# Patient Record
Sex: FEMALE | Race: WHITE
[De-identification: names, ages, dates, MRNs, and addresses within clinical notes are randomized per-mention and may not be internally consistent; named-entity substitution may affect disease eponyms.]

---

## 2018-01-09 ENCOUNTER — HOSPITAL ENCOUNTER (OUTPATIENT)
Dept: HOSPITAL 45 - X.SURG | Age: 78
Discharge: HOME | End: 2018-01-09
Attending: OPHTHALMOLOGY
Payer: COMMERCIAL

## 2018-01-09 VITALS — OXYGEN SATURATION: 95 % | SYSTOLIC BLOOD PRESSURE: 172 MMHG | DIASTOLIC BLOOD PRESSURE: 88 MMHG | HEART RATE: 64 BPM

## 2018-01-09 VITALS
HEIGHT: 61.5 IN | BODY MASS INDEX: 31.19 KG/M2 | WEIGHT: 167.35 LBS | BODY MASS INDEX: 31.19 KG/M2 | WEIGHT: 167.35 LBS | HEIGHT: 61.5 IN

## 2018-01-09 VITALS — TEMPERATURE: 98.24 F

## 2018-01-09 DIAGNOSIS — Z88.1: ICD-10-CM

## 2018-01-09 DIAGNOSIS — Z88.2: ICD-10-CM

## 2018-01-09 DIAGNOSIS — I10: ICD-10-CM

## 2018-01-09 DIAGNOSIS — E66.3: ICD-10-CM

## 2018-01-09 DIAGNOSIS — G47.33: ICD-10-CM

## 2018-01-09 DIAGNOSIS — Z82.49: ICD-10-CM

## 2018-01-09 DIAGNOSIS — Z80.8: ICD-10-CM

## 2018-01-09 DIAGNOSIS — Z82.3: ICD-10-CM

## 2018-01-09 DIAGNOSIS — Z79.82: ICD-10-CM

## 2018-01-09 DIAGNOSIS — Z90.89: ICD-10-CM

## 2018-01-09 DIAGNOSIS — H26.9: Primary | ICD-10-CM

## 2018-01-09 DIAGNOSIS — E78.5: ICD-10-CM

## 2018-01-09 DIAGNOSIS — Z79.899: ICD-10-CM

## 2018-01-09 RX ADMIN — KETOROLAC TROMETHAMINE SCH DROP: 5 SOLUTION OPHTHALMIC at 10:14

## 2018-01-09 RX ADMIN — PHENYLEPHRINE HYDROCHLORIDE SCH DROP: 25 SOLUTION/ DROPS OPHTHALMIC at 10:11

## 2018-01-09 RX ADMIN — KETOROLAC TROMETHAMINE SCH DROP: 5 SOLUTION OPHTHALMIC at 10:19

## 2018-01-09 RX ADMIN — TROPICAMIDE SCH DROP: 10 SOLUTION/ DROPS OPHTHALMIC at 10:17

## 2018-01-09 RX ADMIN — CYCLOPENTOLATE HYDROCHLORIDE SCH DROP: 10 SOLUTION/ DROPS OPHTHALMIC at 10:13

## 2018-01-09 RX ADMIN — PHENYLEPHRINE HYDROCHLORIDE SCH DROP: 25 SOLUTION/ DROPS OPHTHALMIC at 10:16

## 2018-01-09 RX ADMIN — GATIFLOXACIN SCH DROP: 5 SOLUTION/ DROPS OPHTHALMIC at 10:15

## 2018-01-09 RX ADMIN — CYCLOPENTOLATE HYDROCHLORIDE SCH DROP: 10 SOLUTION/ DROPS OPHTHALMIC at 10:18

## 2018-01-09 RX ADMIN — GATIFLOXACIN SCH DROP: 5 SOLUTION/ DROPS OPHTHALMIC at 10:25

## 2018-01-09 RX ADMIN — TROPICAMIDE SCH DROP: 10 SOLUTION/ DROPS OPHTHALMIC at 10:12

## 2018-01-09 NOTE — ANESTHESIA PROGRESS NT - [facility]
Anesthesia Post Op Note


Date & Time


Jan 9, 2018 at 11:35





Vital Signs


Pain Intensity:  0





Vital Signs Past 12 Hours








  Date Time  Temp Pulse Resp B/P (MAP) Pulse Ox O2 Delivery O2 Flow Rate FiO2


 


1/9/18 11:30  64 16 172/88 (116) 95 Room Air  


 


1/9/18 11:08 36.8 58 16 159/91 (113) 96 Room Air  


 


1/9/18 10:28  64  172/94 (120)    


 


1/9/18 10:04 36.7 68 16 173/104 (127) 97 Room Air  











Notes


Mental Status:  alert / awake / arousable, participated in evaluation


Pt Amnestic to Procedure:  Yes


Nausea / Vomiting:  adequately controlled


Pain:  adequately controlled


Airway Patency, RR, SpO2:  stable & adequate


BP & HR:  stable & adequate


Hydration State:  stable & adequate


Anesthetic Complications:  no major complications apparent

## 2018-01-09 NOTE — [facility] OPERATIVE REPORT
Operative Report


Date of Service


Jan 9, 2018.





Operative Report


1.  PREOPERATIVE DIAGNOSIS:  Cataract of the left eye.





2.  POSTOPERATIVE DIAGNOSIS:  Same.





3.  PROCEDURE:  Phacoemulsification with intraocular lens implantation of the 

left eye.  





SURGEON:  Dr. Gabe Enciso.  ANESTHESIA:  Topical Lidocaine gel, 1% Non-

Preserved intracameral Lidocaine, and monitored intravenous sedation.  





INDICATIONS FOR THE PROCEDURE:  The patient is a 77 - year-old female with a 

history of cataract of the left eye causing significant visual impairment.  The 

details of the proposed procedure were explained to the patient who asked 

appropriate questions and following discussion of all risks, benefits and 

alternatives agreed to have the procedure done.  





4.  OPERATION AND FINDINGS:  





DESCRIPTION OF PROCEDURE:  After informed consent was obtained, the patient was 

brought to the Operating Room at the [facility].  The 

patient was placed in a supine position and then the left eye was prepped and 

draped in the usual sterile fashion for intraocular surgery.  A drop of topical 

Lidocaine gel was placed in the operative eye.  A wire lid speculum was then 

placed in the fornices.  A corneal paracentesis was then created temporally.  

The Non-Preserved Lidocaine was then instilled into the anterior chamber.  The 

anterior chamber was then pressurized with viscoelastic.  A 2.0 mm clear 

corneal incision was then created temporally.  A cystotome was inserted into 

the anterior chamber and used to create a tear in the anterior lens capsule.  

This capsular tear was then used to create a small flap and the flap was 

dragged in a counterclockwise direction in order to create a continuous 

curvilinear capsulorrhexis.  Hydrodissection was accomplished with balanced 

salt solution.  Phacoemulsification of the lens nucleus was then performed in a 

standard divide-and-conquer technique.  The phaco time was 23 seconds with an 

average power of 14 %.  The remaining cortical material was removed using 

irrigation aspiration.  The capsular bag was then filled with viscoelastic.  A 

Bausch & Lomb MI60L +18.5 diopters lens was then loaded into the injector and 

injected into the capsular bag.  The remaining viscoelastic was removed with 

the irrigation aspiration handpiece.  The wound was hydrated and then checked 

and found to be watertight.  The intraocular pressure was checked and found to 

be adequate.  The wire lid speculum was removed and the patient's face was 

cleaned and dried.  TobraDex ointment was placed in the inferior fornix.  The 

patient was discharged to the Recovery Room having tolerated the procedure 

well.  There were no complications.  The patient will be seen tomorrow in the 

office for follow-up.


I attest to the content of the Intraoperative Record and any orders documented 

therein.  Any exceptions are noted below.

## 2018-02-06 ENCOUNTER — HOSPITAL ENCOUNTER (OUTPATIENT)
Dept: HOSPITAL 45 - X.SURG | Age: 78
Discharge: HOME | End: 2018-02-06
Attending: OPHTHALMOLOGY
Payer: COMMERCIAL

## 2018-02-06 VITALS
HEIGHT: 61.5 IN | BODY MASS INDEX: 31.19 KG/M2 | WEIGHT: 167.35 LBS | WEIGHT: 167.35 LBS | BODY MASS INDEX: 31.19 KG/M2 | HEIGHT: 61.5 IN

## 2018-02-06 VITALS — DIASTOLIC BLOOD PRESSURE: 84 MMHG | SYSTOLIC BLOOD PRESSURE: 170 MMHG

## 2018-02-06 VITALS — OXYGEN SATURATION: 94 % | HEART RATE: 76 BPM

## 2018-02-06 VITALS — TEMPERATURE: 97.16 F

## 2018-02-06 DIAGNOSIS — G47.33: ICD-10-CM

## 2018-02-06 DIAGNOSIS — Z88.2: ICD-10-CM

## 2018-02-06 DIAGNOSIS — K21.9: ICD-10-CM

## 2018-02-06 DIAGNOSIS — I10: ICD-10-CM

## 2018-02-06 DIAGNOSIS — E78.5: ICD-10-CM

## 2018-02-06 DIAGNOSIS — M19.90: ICD-10-CM

## 2018-02-06 DIAGNOSIS — H26.9: Primary | ICD-10-CM

## 2018-02-06 DIAGNOSIS — Z91.040: ICD-10-CM

## 2018-02-06 DIAGNOSIS — Z88.1: ICD-10-CM

## 2018-02-06 DIAGNOSIS — Z99.89: ICD-10-CM

## 2018-02-06 RX ADMIN — TROPICAMIDE SCH DROP: 10 SOLUTION/ DROPS OPHTHALMIC at 06:50

## 2018-02-06 RX ADMIN — TROPICAMIDE SCH DROP: 10 SOLUTION/ DROPS OPHTHALMIC at 06:56

## 2018-02-06 RX ADMIN — GATIFLOXACIN SCH DROP: 5 SOLUTION/ DROPS OPHTHALMIC at 07:02

## 2018-02-06 RX ADMIN — CYCLOPENTOLATE HYDROCHLORIDE SCH DROP: 10 SOLUTION/ DROPS OPHTHALMIC at 06:51

## 2018-02-06 RX ADMIN — CYCLOPENTOLATE HYDROCHLORIDE SCH DROP: 10 SOLUTION/ DROPS OPHTHALMIC at 06:57

## 2018-02-06 RX ADMIN — PHENYLEPHRINE HYDROCHLORIDE SCH DROP: 25 SOLUTION/ DROPS OPHTHALMIC at 06:49

## 2018-02-06 RX ADMIN — KETOROLAC TROMETHAMINE SCH DROP: 5 SOLUTION OPHTHALMIC at 06:58

## 2018-02-06 RX ADMIN — GATIFLOXACIN SCH DROP: 5 SOLUTION/ DROPS OPHTHALMIC at 06:53

## 2018-02-06 RX ADMIN — PHENYLEPHRINE HYDROCHLORIDE SCH DROP: 25 SOLUTION/ DROPS OPHTHALMIC at 06:55

## 2018-02-06 RX ADMIN — KETOROLAC TROMETHAMINE SCH DROP: 5 SOLUTION OPHTHALMIC at 06:52

## 2018-02-06 NOTE — DISCHARGE INSTRUCTIONS-[facility]
Discharge Instructions


Date of Service


Feb 6, 2018.





Visit


Reason for Visit:  Cataract Right Eye





Discharge


Discharge Diagnosis / Problem:  cataract





Discharge Goals


Goal(s):  Improve function





Activity Recommendations


Activity Limitations:  per Instructions/Follow-up section





Anesthesia


.





Post Anesthesia Instructions:





If you have had General Anesthesia or IV Sedation:





*  Do not drive today.


*  Resume driving when surgeon permits.


*  Do not make important decisions or sign legal documents today.


*  Call surgeon for:





   1.  Temperature elevations greater than 101 degrees F.


   2.  Uncontrollable pain.


   3.  Excessive bleeding.


   4.  Persistent nausea and vomiting.


   5.  Medication intolerance (nausea, vomiting or rash).





*  For nausea and vomiting use only clear liquids such as: tea, soda, bouillon 

until nausea subsides, then gradually increase diet as tolerated.





*  If you have any concerns or questions, call your surgeon's office.  If 

physician is unavailable and it is an emergency, call 911 or go to the nearest 

emergency room.





.





Diet Recommendations


Home Diet:  resume previous diet





Procedures


Procedures Performed:  


Right Cataract Phacoemulsification With Intraocular Lens Implant





Pending Studies


Studies pending at discharge:  no





Medical Emergencies








.


Who to Call and When:





Medical Emergencies:  If at any time you feel your situation is an emergency, 

please call 911 immediately.





.





Non-Emergent Contact


Non-Emergency issues call your:  Ophthalmologist





.


.








"Provider Documentation" section prepared by Gabe Enciso.








.

## 2018-02-06 NOTE — ANESTHESIA PROGRESS NT - [facility]
Anesthesia Post Op Note


Date & Time


Feb 6, 2018 at 08:21





Vital Signs


Pain Intensity:  0





Vital Signs Past 12 Hours








  Date Time  Temp Pulse Resp B/P (MAP) Pulse Ox O2 Delivery O2 Flow Rate FiO2


 


2/6/18 08:00 36.2 68 16 156/88 (110) 97 Room Air  


 


2/6/18 06:35 36.5 74 16 161/89 (113) 96 Room Air  











Notes


Mental Status:  alert / awake / arousable, participated in evaluation


Pt Amnestic to Procedure:  Yes


Nausea / Vomiting:  adequately controlled


Pain:  adequately controlled


Airway Patency, RR, SpO2:  stable & adequate


BP & HR:  stable & adequate


Hydration State:  stable & adequate


Anesthetic Complications:  no major complications apparent

## 2018-02-06 NOTE — [facility] OPERATIVE REPORT
Operative Report


Date of Service


Feb 6, 2018.





Operative Report


1.  PREOPERATIVE DIAGNOSIS:  Cataract of the right eye.





2.  POSTOPERATIVE DIAGNOSIS:  Same.





3.  PROCEDURE:  Phacoemulsification with intraocular lens implantation of the 

right eye.  





SURGEON:  Dr. Gabe Enciso.  ANESTHESIA:  Topical Lidocaine gel, 1% Non-

Preserved intracameral Lidocaine, and monitored intravenous sedation.  





INDICATIONS FOR THE PROCEDURE:  The patient is a 77 - year-old female with a 

history of cataract of the right eye causing significant visual impairment.  

The details of the proposed procedure were explained to the patient who asked 

appropriate questions and following discussion of all risks, benefits and 

alternatives agreed to have the procedure done.  





4.  OPERATION AND FINDINGS:  





DESCRIPTION OF PROCEDURE:  After informed consent was obtained, the patient was 

brought to the Operating Room at the [facility].  The 

patient was placed in a supine position and then the right eye was prepped and 

draped in the usual sterile fashion for intraocular surgery.  A drop of topical 

Lidocaine gel was placed in the operative eye.  A wire lid speculum was then 

placed in the fornices.  A corneal paracentesis was then created temporally.  

The Non-Preserved Lidocaine was then instilled into the anterior chamber.  The 

anterior chamber was then pressurized with viscoelastic.  A 2.0 mm clear 

corneal incision was then created temporally.  A cystotome was inserted into 

the anterior chamber and used to create a tear in the anterior lens capsule.  

This capsular tear was then used to create a small flap and the flap was 

dragged in a counterclockwise direction in order to create a continuous 

curvilinear capsulorrhexis.  Hydrodissection was accomplished with balanced 

salt solution.  Phacoemulsification of the lens nucleus was then performed in a 

standard divide-and-conquer technique.  The phaco time was 22 seconds with an 

average power of 9 %.  The remaining cortical material was removed using 

irrigation aspiration.  The capsular bag was then filled with viscoelastic.  A 

Bausch & Lomb MI60L +20.5 diopters lens was then loaded into the injector and 

injected into the capsular bag.  The remaining viscoelastic was removed with 

the irrigation aspiration handpiece.  The wound was hydrated and then checked 

and found to be watertight.  The intraocular pressure was checked and found to 

be adequate.  The wire lid speculum was removed and the patient's face was 

cleaned and dried.  TobraDex ointment was placed in the inferior fornix.  The 

patient was discharged to the Recovery Room having tolerated the procedure 

well.  There were no complications.  The patient will be seen tomorrow in the 

office for follow-up.


I attest to the content of the Intraoperative Record and any orders documented 

therein.  Any exceptions are noted below.

## 2018-03-17 ENCOUNTER — HOSPITAL ENCOUNTER (INPATIENT)
Dept: HOSPITAL 45 - C.EDA | Age: 78
LOS: 13 days | Discharge: HOME | DRG: 871 | End: 2018-03-30
Attending: HOSPITALIST | Admitting: FAMILY MEDICINE
Payer: COMMERCIAL

## 2018-03-17 VITALS
BODY MASS INDEX: 32.59 KG/M2 | BODY MASS INDEX: 32.59 KG/M2 | HEIGHT: 61 IN | HEIGHT: 61 IN | WEIGHT: 172.62 LBS | WEIGHT: 172.62 LBS

## 2018-03-17 VITALS — OXYGEN SATURATION: 93 %

## 2018-03-17 VITALS
TEMPERATURE: 99.32 F | HEART RATE: 80 BPM | OXYGEN SATURATION: 96 % | DIASTOLIC BLOOD PRESSURE: 70 MMHG | SYSTOLIC BLOOD PRESSURE: 134 MMHG

## 2018-03-17 VITALS
TEMPERATURE: 97.88 F | DIASTOLIC BLOOD PRESSURE: 71 MMHG | OXYGEN SATURATION: 98 % | HEART RATE: 69 BPM | SYSTOLIC BLOOD PRESSURE: 112 MMHG

## 2018-03-17 VITALS
HEART RATE: 90 BPM | TEMPERATURE: 100.22 F | DIASTOLIC BLOOD PRESSURE: 94 MMHG | OXYGEN SATURATION: 93 % | SYSTOLIC BLOOD PRESSURE: 160 MMHG

## 2018-03-17 VITALS — OXYGEN SATURATION: 96 %

## 2018-03-17 VITALS — OXYGEN SATURATION: 97 % | HEART RATE: 92 BPM

## 2018-03-17 DIAGNOSIS — Z79.82: ICD-10-CM

## 2018-03-17 DIAGNOSIS — Z88.1: ICD-10-CM

## 2018-03-17 DIAGNOSIS — N17.9: ICD-10-CM

## 2018-03-17 DIAGNOSIS — T50.905A: ICD-10-CM

## 2018-03-17 DIAGNOSIS — I11.0: ICD-10-CM

## 2018-03-17 DIAGNOSIS — Z88.2: ICD-10-CM

## 2018-03-17 DIAGNOSIS — M62.82: ICD-10-CM

## 2018-03-17 DIAGNOSIS — I50.23: ICD-10-CM

## 2018-03-17 DIAGNOSIS — E86.0: ICD-10-CM

## 2018-03-17 DIAGNOSIS — A41.9: Primary | ICD-10-CM

## 2018-03-17 DIAGNOSIS — G47.33: ICD-10-CM

## 2018-03-17 DIAGNOSIS — Z79.899: ICD-10-CM

## 2018-03-17 DIAGNOSIS — E78.5: ICD-10-CM

## 2018-03-17 DIAGNOSIS — R29.6: ICD-10-CM

## 2018-03-17 DIAGNOSIS — Z91.81: ICD-10-CM

## 2018-03-17 DIAGNOSIS — Z91.040: ICD-10-CM

## 2018-03-17 DIAGNOSIS — L03.213: ICD-10-CM

## 2018-03-17 DIAGNOSIS — E87.6: ICD-10-CM

## 2018-03-17 DIAGNOSIS — R21: ICD-10-CM

## 2018-03-17 DIAGNOSIS — I25.10: ICD-10-CM

## 2018-03-17 LAB
ALBUMIN SERPL-MCNC: 3.2 GM/DL (ref 3.4–5)
ALP SERPL-CCNC: 120 U/L (ref 45–117)
ALT SERPL-CCNC: 29 U/L (ref 12–78)
AST SERPL-CCNC: 49 U/L (ref 15–37)
BASOPHILS # BLD: 0.01 K/UL (ref 0–0.2)
BASOPHILS NFR BLD: 0.1 %
BUN SERPL-MCNC: 28 MG/DL (ref 7–18)
CALCIUM SERPL-MCNC: 9.4 MG/DL (ref 8.5–10.1)
CO2 SERPL-SCNC: 24 MMOL/L (ref 21–32)
CREAT SERPL-MCNC: 1.55 MG/DL (ref 0.6–1.2)
EOS ABS #: 0 K/UL (ref 0–0.5)
EOSINOPHIL NFR BLD AUTO: 150 K/UL (ref 130–400)
GLUCOSE SERPL-MCNC: 150 MG/DL (ref 70–99)
HCT VFR BLD CALC: 37 % (ref 37–47)
HGB BLD-MCNC: 13.2 G/DL (ref 12–16)
IG#: 0.07 K/UL (ref 0–0.02)
IMM GRANULOCYTES NFR BLD AUTO: 2.4 %
INR PPP: 1.1 (ref 0.9–1.1)
LIPASE: 140 U/L (ref 73–393)
LYMPHOCYTES # BLD: 0.46 K/UL (ref 1.2–3.4)
MCH RBC QN AUTO: 30.6 PG (ref 25–34)
MCHC RBC AUTO-ENTMCNC: 35.7 G/DL (ref 32–36)
MCV RBC AUTO: 85.8 FL (ref 80–100)
MONO ABS #: 0.91 K/UL (ref 0.11–0.59)
MONOCYTES NFR BLD: 4.7 %
NEUT ABS #: 18.06 K/UL (ref 1.4–6.5)
NEUTROPHILS # BLD AUTO: 0 %
NEUTROPHILS NFR BLD AUTO: 92.4 %
PMV BLD AUTO: 10.7 FL (ref 7.4–10.4)
POTASSIUM SERPL-SCNC: 3.5 MMOL/L (ref 3.5–5.1)
PROT SERPL-MCNC: 7.3 GM/DL (ref 6.4–8.2)
PTT PATIENT: 26.4 SECONDS (ref 21–31)
PTT PATIENT: 37.7 SECONDS (ref 21–31)
RED CELL DISTRIBUTION WIDTH CV: 14.1 % (ref 11.5–14.5)
RED CELL DISTRIBUTION WIDTH SD: 44.8 FL (ref 36.4–46.3)
SODIUM SERPL-SCNC: 134 MMOL/L (ref 136–145)
WBC # BLD AUTO: 19.51 K/UL (ref 4.8–10.8)

## 2018-03-17 RX ADMIN — CETIRIZINE HYDROCHLORIDE PRN MG: 10 TABLET, FILM COATED ORAL at 15:54

## 2018-03-17 RX ADMIN — HEPARIN SODIUM SCH MLS/HR: 5000 INJECTION, SOLUTION INTRAVENOUS at 18:11

## 2018-03-17 RX ADMIN — ACETAMINOPHEN PRN MG: 325 TABLET ORAL at 15:54

## 2018-03-17 RX ADMIN — PIPERACILLIN AND TAZOBACTAM SCH MLS/HR: 3; .375 INJECTION, POWDER, LYOPHILIZED, FOR SOLUTION INTRAVENOUS; PARENTERAL at 23:18

## 2018-03-17 RX ADMIN — PIPERACILLIN AND TAZOBACTAM SCH MLS/HR: 3; .375 INJECTION, POWDER, LYOPHILIZED, FOR SOLUTION INTRAVENOUS; PARENTERAL at 15:53

## 2018-03-17 RX ADMIN — ALBUTEROL SULFATE SCH PUFFS: 90 AEROSOL, METERED RESPIRATORY (INHALATION) at 17:11

## 2018-03-17 RX ADMIN — SODIUM CHLORIDE SCH MLS/HR: 900 INJECTION, SOLUTION INTRAVENOUS at 23:18

## 2018-03-17 RX ADMIN — IPRATROPIUM BROMIDE AND ALBUTEROL SULFATE SCH ML: .5; 3 SOLUTION RESPIRATORY (INHALATION) at 19:23

## 2018-03-17 RX ADMIN — ACETAMINOPHEN PRN MG: 325 TABLET ORAL at 20:44

## 2018-03-17 RX ADMIN — SODIUM CHLORIDE SCH MLS/HR: 900 INJECTION, SOLUTION INTRAVENOUS at 15:09

## 2018-03-17 NOTE — DIAGNOSTIC IMAGING REPORT
CHEST 1 VW FRONT-NOT PORTABLE



CLINICAL HISTORY: FEVER/SEPSIS. CHANGED PORT TO DEPT    



COMPARISON STUDY:  No previous studies for comparison.



FINDINGS: Mild cardiomegaly. Lungs are grossly clear. Slight basilar

interstitial prominence considered nonspecific. 



IMPRESSION:  Slight bibasilar interstitial prominence. Otherwise negative study.

Mild cardia megaly. 











The above report was generated using voice recognition software.  It may contain

grammatical, syntax or spelling errors.









Electronically signed by:  Rosalio Foster M.D.

3/17/2018 11:19 AM



Dictated Date/Time:  3/17/2018 11:18 AM

## 2018-03-17 NOTE — DIAGNOSTIC IMAGING REPORT
R KNEE 3 VIEWS



CLINICAL HISTORY: pain fall trauma. Pain.



COMPARISON: None.



DISCUSSION: The bones and joint spaces appear intact. There is no evidence of

fracture, dislocation or bony disease. Generalized degenerative change. Mild

soft tissue edema. No significant joint effusion.



IMPRESSION: Degenerative change. No acute bony abnormality.











The above report was generated using voice recognition software.  It may contain

grammatical, syntax or spelling errors.







Electronically signed by:  Rosalio Foster M.D.

3/17/2018 11:05 AM



Dictated Date/Time:  3/17/2018 11:05 AM

## 2018-03-17 NOTE — DIAGNOSTIC IMAGING REPORT
HEAD WITHOUT CONTRAST (CT)



CT DOSE: 1046.89 mGy.cm



HISTORY: Trauma  pain fall



TECHNIQUE: Multiaxial CT images of the head were performed without the use of

intravenous contrast.  A dose lowering technique was utilized adhering to the

principles of ALARA.





Comparison: None.



Findings: The paranasal sinuses and mastoid air cells are clear. The calvarium

and skull base are intact. The ventricles and sulci are within normal limits.

There is no mass, hematoma, midline shift, or acute infarct. 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

periventricular infarct. Mild age-related chronic small vessel change.



Impression:

No acute intracranial abnormality. Chronic and age-related change.











The above report was generated using voice recognition software.  It may contain

grammatical, syntax or spelling errors.







Electronically signed by:  Rosalio Foster M.D.

3/17/2018 11:52 AM



Dictated Date/Time:  3/17/2018 11:51 AM

## 2018-03-17 NOTE — DIAGNOSTIC IMAGING REPORT
CERVICAL SPINE W/O





CT DOSE:    



HISTORY: Trauma  pain fall



TECHNIQUE: Multiaxial CT images of the cervical spine were performed and

reformatted in the sagittal and coronal plane without the use of contrast.  A

dose lowering technique was utilized adhering to the principles of ALARA.



COMPARISON:  None.



FINDINGS: No fractures. No subluxation. Prevertebral soft tissues and the C1-C2

interval are intact. No pneumothorax.



IMPRESSION:  

No fractures within the cervical spine. Moderate degenerative disc changes

throughout.







The above report was generated using voice recognition software.  It may contain

grammatical, syntax or spelling errors.







Electronically signed by:  Rosalio Foster M.D.

3/17/2018 11:53 AM



Dictated Date/Time:  3/17/2018 11:52 AM

## 2018-03-17 NOTE — PHARMACY PROGRESS NOTE
Pharmacy Abx Dose Short Note


Date of Service


Mar 17, 2018.





Assessment & Plan











Item Value  Date Time


 


White Blood Count 19.51 K/uL H 3/17/18 1022


 


Creatinine 1.55 mg/dl H 3/17/18 1022


 


Est Creatinine Clear Calc Drug Dose 27.8 ml/min 3/17/18 1022








Assessment


77 year old female receiving VANC/ZOSYN for treatment of facial swelling





Plan


Vancomycin


* Estimated p'kinetics:  Vd~0.7L/kg, Ke~0.0276hr-1, T1/2~25 hrs


* LOADING DOSE:  VANC 1500mg (~20mg/kg) IV load in ED


* MAINTENANCE DOSE:  VANC 1000mg (~13mg/kg) IV q 24 hours


* Goal trough level: ~10-15 mcg/mL depending on C&S


* VANC trough @ [facility] prior to 3/ dose.


* Will monitor renal fxn closely.





Pharmacy will continue to follow and will adjust dose/frequency as necessary.  

Thank you.

## 2018-03-17 NOTE — EMERGENCY ROOM VISIT NOTE
History


Report prepared by Ruthy:  Sreekanth Freire


Under the Supervision of:  Dr. Cristino Weldon M.D.


First contact with patient:  09:41


Chief Complaint:  FALL


Stated Complaint:  FALL/EYE PAIN





History of Present Illness


The patient is a 77 year old female who presents to the Emergency Room with 

complaints of worsening facial swelling beginning yesterday. The patient states 

that her swelling began yesterday morning when she woke up. She notes that she 

felt fine the day before and did not have any symptoms. Per nurse, the patient 

was diagnosed in 2005 with a skin infection and is currently experiencing 

similar symptoms. The patient notes that she had a cold that started 2 days ago

, but believes that it has since turned into a sinus infection. She reports 

that she fell this morning at around 0700 because she became weak. She states 

that she fell to her knees and then caught herself. She notes that she did not 

hit her face or loss consciousness. She reports that she was unable to get up 

this morning after she fell because she was weak. The patient states that her 

knee is much stiffer than usual. In addition to feeling weak, she also 

complains of left eye pain, knee pain, and neck pain. She denies any nausea, 

vomiting, diarrhea, SOB, headache, and abdominal pain. She notes that she had a 

cataract operation a month ago, but denies any history of shingles and 

diabetes. She reports that she has had the herpes zoster vaccine. The patient 

states that she is not on any blood thinners.





   Source of History:  patient


   Onset:  yesterday


   Position:  other (face)


   Quality:  other (swelling)


   Timing:  worsening


   Associated Symptoms:  + weakness, No headache, No SOB, No nausea, No vomiting

, No abdominal pain, No diarrhea


Note:


The patient also complains of left eye pain, knee pain, and neck pain.





Review of Systems


See HPI for pertinent positives and negatives.  A total of ten systems were 

reviewed and were otherwise negative.





Past Medical & Surgical


Medical Problems:


(1) Skin infection


Surgical Problems:


(1) History of cataract surgery








Family History


No pertinent family history stated.





Social History


Smoking Status:  Never Smoker


Marital Status:  


Housing Status:  lives with family


Occupation Status:  retired





Current/Historical Medications


Scheduled


Albuterol Hfa (Ventolin Hfa), 2 PUFFS INH QID


Allopurinol (Zyloprim), 100 MG PO BID


Aspirin (Aspirin 81), 81 MG PO DAILY


Atenolol (Tenormin), 12.5 MG PO DAILY


Atorvastatin (Lipitor), 40 MG PO DAILY


Calcium Carbonate-Vitamin D (Calcium + D), 1 TAB PO DAILY


Clobetasol Propionate (Temovate), 1 APPLN TOP 4XWK


Felodipine (Plendil Er), 5 MG PO QAM


Hydrochlorothiazide (Hctz), 25 MG PO QAM


Ketoconazole (Topical) (Ketoconazole), 1 APPLN TOP 2XWK


Lisinopril (Prinivil), 10 MG PO BID


Magnesium (Magnesium), 1 TAB PO DAILY


Multivitamin (Multivitamin), 1 TAB PO DAILY


Omeprazole (Omeprazole), 1 TAB PO QAM


Terazosin Hcl (Hytrin), 2 MG PO QPM





Scheduled PRN


Cetirizine (Zyrtec), 10 MG PO DAILY PRN for Allergic Reaction


Furosemide (Lasix), 20 MG PO 2XWK PRN for FLUID ACCUMULATION


Meclizine Hcl (Meclizine Hcl), 25 MG PO TID PRN for Dizziness or Vertigo


Meclizine Hcl (Meclizine Hcl), 25 MG PO UD PRN for Dizziness or Vertigo


Saline (Ocean Nasal Essie), 1 DOSE INTNAS UD PRN for CONGESTION





Miscellaneous Medications


Albuterol Sulf (Ventolin)





Allergies


Coded Allergies:  


     Latex1 -Allergic Contact Dermititis (Verified  Allergy, Unknown, SLIGHT 

RASH, 3/17/18)


     Doxycycline (Unverified  Adverse Reaction, Unknown, GI UPSET, 3/17/18)


     Meperidine (Unverified  Adverse Reaction, Unknown, GI UPSET, 3/17/18)


     Propoxyphene (Unverified  Adverse Reaction, Unknown, GI UPSET, 3/17/18)


     Sulfa Antibiotics (Unverified  Adverse Reaction, Unknown, GI UPSET, 3/17/18

)





Physical Exam


Vital Signs











  Date Time  Temp Pulse Resp B/P (MAP) Pulse Ox O2 Delivery O2 Flow Rate FiO2


 


3/17/18 13:02  87 28 170/94 96 Nasal Cannula 2.0 


 


3/17/18 11:34  79 18 148/83 94 Nasal Cannula 2.0 


 


3/17/18 10:22  87 24 115/61 93 Nasal Cannula 2.0 


 


3/17/18 10:03     94 Nasal Cannula 2.0 


 


3/17/18 09:54  93      


 


3/17/18 09:48 37.8 70 12 105/65 85 Room Air  











Physical Exam


GENERAL: Awake, alert,  fatigued appearing, in no distress


HENT: Normocephalic, atraumatic. Oropharynx unremarkable, 1+ facial edema with 

erythema and warmth, left sided induration, no crepitus, mild submandibular 

lymphadenopathy, no oropharyngeal edema, no tongue elevation or trismus.


EYES: Normal conjunctiva. Sclera non-icteric. No proptosis, extraocular muscles 

intact.


NECK: Supple. No nuchal rigidity. FROM. No JVD.


RESPIRATORY: Clear to auscultation.


CARDIAC: Regular rate, normal rhythm. Extremities warm and well perfused. 

Pulses equal.


ABDOMEN: Soft, non-distended. No tenderness to palpation. No rebound or 

guarding. No masses. Obese abdomen.


RECTAL: Deferred.


MUSCULOSKELETAL: Chest examination reveals no tenderness. The back is 

symmetrical on inspection without obvious abnormality. There is no CVA 

tenderness to palpation. No joint edema. 


LOWER EXTREMITIES: Calves are equal size bilaterally. No edema. No 

discoloration. Mild tenderness to bilateral knees, PMS intact. 


NEURO: Normal sensorium. No sensory or motor deficits noted. 


SKIN: No rash or jaundice noted.





Medical Decision & Procedures


ER Provider


Diagnostic Interpretation:


Radiology results as stated below per my review and radiologist interpretation: 





L TIBIA/FIBULA 2 VIEWS ROUTINE





CLINICAL HISTORY: pain fall trauma. Pain.





COMPARISON: None.





DISCUSSION: The bones and joint spaces appear intact. There is no evidence of


fracture, dislocation or bony disease. There is no evidence for soft tissue


swelling.





IMPRESSION: Negative study.





The above report was generated using voice recognition software.  It may contain


grammatical, syntax or spelling errors.





Electronically signed by:  Rosalio Foster M.D.


3/17/2018 11:06 AM





R TIBIA/FIBULA 2 VIEWS ROUTINE





CLINICAL HISTORY: pain fall trauma





COMPARISON: None.





DISCUSSION: The bones and joint spaces appear intact. There is no evidence of


fracture, dislocation or bony disease. There is no evidence for soft tissue


swelling.





IMPRESSION: Negative study.





The above report was generated using voice recognition software.  It may contain


grammatical, syntax or spelling errors.





Electronically signed by:  Rosalio Foster M.D.


3/17/2018 11:05 AM





L KNEE 3 VIEWS





CLINICAL HISTORY: pain fall    





COMPARISON: None.





DISCUSSION: Generalized significant degenerative change all major joint


compartments. No evidence for fracture. Moderate osteophytic changes throughout.


There is no evidence for soft tissue swelling.





IMPRESSION: Significant degenerative change all major joint compartments. No


acute bony abnormality.





The above report was generated using voice recognition software.  It may contain


grammatical, syntax or spelling errors.





Electronically signed by:  Rosalio Foster M.D.


3/17/2018 11:07 AM





R KNEE 3 VIEWS





CLINICAL HISTORY: pain fall trauma. Pain.





COMPARISON: None.





DISCUSSION: The bones and joint spaces appear intact. There is no evidence of


fracture, dislocation or bony disease. Generalized degenerative change. Mild


soft tissue edema. No significant joint effusion.





IMPRESSION: Degenerative change. No acute bony abnormality.





The above report was generated using voice recognition software.  It may contain


grammatical, syntax or spelling errors.





Electronically signed by:  Rosalio Foster M.D.


3/17/2018 11:05 AM





PELVIS/BILATERAL HIP 2 VIEWS





FINDINGS: Moderate degenerative change of the hips bilaterally. Moderate


degenerative sclerotic changes of the sacroiliac joints and symphysis pubis.





No acute bony abnormality. Degenerative changes low lumbar spine.





IMPRESSION:  Degenerative change. No acute process. 





The above report was generated using voice recognition software.  It may contain


grammatical, syntax or spelling errors.





Electronically signed by:  Rosalio Foster M.D.


3/17/2018 11:11 AM





CHEST 1 VW FRONT-NOT PORTABLE





FINDINGS: Mild cardiomegaly. Lungs are grossly clear. Slight basilar


interstitial prominence considered nonspecific. 





IMPRESSION:  Slight bibasilar interstitial prominence. Otherwise negative study.


Mild cardia megaly. 





The above report was generated using voice recognition software.  It may contain


grammatical, syntax or spelling errors.





Electronically signed by:  Rosalio Foster M.D.


3/17/2018 11:19 AM





SOFT TISSUE NECK WITH





FINDINGS: Minimal to mild right perimandibular and submandibular infiltrative


changes subcutaneous fat and associated fascial planes. No evidence for true


mass or collection. Several small reactive nodes less than 6 mm.





The salivary glands including parotid and submandibular glands are unremarkable.





Glottic and subglottic regions are intact. There is no airway compromise.


Chronic apical fibrotic changes present. Is a subtle high density primarily


cystic nodule adjacent to the right T3-T4 neural foramina. This is felt to


represent a benign perineural cyst. There is mild apical fibrotic changes


bilaterally.





IMPRESSION:  


1. Mild nonspecific cellulitis of the right perimandibular and submandibular


region.


2. No evidence for drainable abscess or collection. 





The above report was generated using voice recognition software.  It may contain


grammatical, syntax or spelling errors.





Electronically signed by:  Rosalio Foster M.D.


3/17/2018 11:58 AM





HEAD WITHOUT CONTRAST (CT)





Findings: The paranasal sinuses and mastoid air cells are clear. The calvarium


and skull base are intact. The ventricles and sulci are within normal limits.


There is no mass, hematoma, midline shift, or acute infarct. 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


periventricular infarct. Mild age-related chronic small vessel change.





Impression:


No acute intracranial abnormality. Chronic and age-related change.





The above report was generated using voice recognition software.  It may contain


grammatical, syntax or spelling errors.





Electronically signed by:  Rosalio Foster M.D.


3/17/2018 11:52 AM





CERVICAL SPINE W/O





FINDINGS: No fractures. No subluxation. Prevertebral soft tissues and the C1-C2


interval are intact. No pneumothorax.





IMPRESSION:  


No fractures within the cervical spine. Moderate degenerative disc changes


throughout.





The above report was generated using voice recognition software.  It may contain


grammatical, syntax or spelling errors.





Electronically signed by:  Rosalio Foster M.D.


3/17/2018 11:53 AM





Laboratory Results


3/17/18 10:22








Red Blood Count 4.31, Mean Corpuscular Volume 85.8, Mean Corpuscular Hemoglobin 

30.6, Mean Corpuscular Hemoglobin Concent 35.7, Mean Platelet Volume 10.7, 

Neutrophils (%) (Auto) 92.4, Lymphocytes (%) (Auto) 2.4, Monocytes (%) (Auto) 

4.7, Eosinophils (%) (Auto) 0.0, Basophils (%) (Auto) 0.1, Neutrophils # (Auto) 

18.06, Lymphocytes # (Auto) 0.46, Monocytes # (Auto) 0.91, Eosinophils # (Auto) 

0.00, Basophils # (Auto) 0.01





3/17/18 10:22

















Test


  3/17/18


10:22 3/17/18


10:28


 


White Blood Count


  19.51 K/uL


(4.8-10.8) 


 


 


Red Blood Count


  4.31 M/uL


(4.2-5.4) 


 


 


Hemoglobin


  13.2 g/dL


(12.0-16.0) 


 


 


Hematocrit 37.0 % (37-47)  


 


Mean Corpuscular Volume


  85.8 fL


() 


 


 


Mean Corpuscular Hemoglobin


  30.6 pg


(25-34) 


 


 


Mean Corpuscular Hemoglobin


Concent 35.7 g/dl


(32-36) 


 


 


Platelet Count


  150 K/uL


(130-400) 


 


 


Mean Platelet Volume


  10.7 fL


(7.4-10.4) 


 


 


Neutrophils (%) (Auto) 92.4 %  


 


Lymphocytes (%) (Auto) 2.4 %  


 


Monocytes (%) (Auto) 4.7 %  


 


Eosinophils (%) (Auto) 0.0 %  


 


Basophils (%) (Auto) 0.1 %  


 


Neutrophils # (Auto)


  18.06 K/uL


(1.4-6.5) 


 


 


Lymphocytes # (Auto)


  0.46 K/uL


(1.2-3.4) 


 


 


Monocytes # (Auto)


  0.91 K/uL


(0.11-0.59) 


 


 


Eosinophils # (Auto)


  0.00 K/uL


(0-0.5) 


 


 


Basophils # (Auto)


  0.01 K/uL


(0-0.2) 


 


 


RDW Standard Deviation


  44.8 fL


(36.4-46.3) 


 


 


RDW Coefficient of Variation


  14.1 %


(11.5-14.5) 


 


 


Immature Granulocyte % (Auto) 0.4 %  


 


Immature Granulocyte # (Auto)


  0.07 K/uL


(0.00-0.02) 


 


 


Venous Blood pH


  7.45


(7.36-7.41) 


 


 


Venous Blood Partial Pressure


CO2 38 mmHg


(38.0-50.0) 


 


 


Venous Blood Partial Pressure


O2 43 mmHg 


  


 


 


Venous Blood HCO3 26 mmol/L  


 


Venous Blood Oxygen Saturation 78.2 %  


 


Venous Blood Base Excess 1.9 mEq/L  


 


Anion Gap


  13.0 mmol/L


(3-11) 


 


 


Est Creatinine Clear Calc


Drug Dose 27.8 ml/min 


  


 


 


Estimated GFR (


American) 36.8 


  


 


 


Estimated GFR (Non-


American 31.7 


  


 


 


BUN/Creatinine Ratio 18.3 (10-20)  


 


Calcium Level


  9.4 mg/dl


(8.5-10.1) 


 


 


Total Bilirubin


  0.7 mg/dl


(0.2-1) 


 


 


Direct Bilirubin


  0.3 mg/dl


(0-0.2) 


 


 


Aspartate Amino Transf


(AST/SGOT) 49 U/L (15-37) 


  


 


 


Alanine Aminotransferase


(ALT/SGPT) 29 U/L (12-78) 


  


 


 


Alkaline Phosphatase


  120 U/L


() 


 


 


Total Creatine Kinase


  1296 U/L


() 


 


 


Total Protein


  7.3 gm/dl


(6.4-8.2) 


 


 


Albumin


  3.2 gm/dl


(3.4-5.0) 


 


 


Lipase


  140 U/L


() 


 


 


Prothrombin Time


  


  11.2 SECONDS


(9.0-12.0)


 


Prothromb Time International


Ratio 


  1.1 (0.9-1.1) 


 





Laboratory results reviewed by me





Medications Administered











 Medications


  (Trade)  Dose


 Ordered  Sig/Sirisha


 Route  Start Time


 Stop Time Status Last Admin


Dose Admin


 


 Sodium Chloride  1,000 ml @ 


 999 mls/hr  Q1H1M STAT


 IV  3/17/18 09:55


 3/17/18 10:55 DC 3/17/18 10:16


999 MLS/HR


 


 Dexamethasone


 Sodium Phosphate


  (Dexamethasone


 Inj **Pf**)  10 mg  NOW  ONCE


 IV  3/17/18 10:00


 3/17/18 10:03 DC 3/17/18 10:17


10 MG


 


 Vancomycin HCl


 1500 mg/Sodium


 Chloride  530 ml @ 


 200 mls/hr  ONE  STAT


 IV  3/17/18 09:55


 3/17/18 12:33 DC 3/17/18 11:18


200 MLS/HR


 


 Piperacillin Sod/


 Tazobactam Sod


  (Zosyn Iv)  4.5 gm  NOW  STAT


 IV  3/17/18 09:55


 3/17/18 10:03 DC 3/17/18 10:18


4.5 GM


 


 Fentanyl Citrate


  (Fentanyl Inj)  50 mcg  NOW  STAT


 IV  3/17/18 11:19


 3/17/18 11:21 DC 3/17/18 11:37


50 MCG


 


 Aspirin


  (Aspirin Chew)  324 mg  NOW  STAT


 PO  3/17/18 12:12


 3/17/18 12:13 DC 3/17/18 12:15


324 MG


 


 Sodium Chloride  1,000 ml @ 


 999 mls/hr  Q1H1M STAT


 IV  3/17/18 12:32


 3/17/18 13:32 DC 3/17/18 12:47


999 MLS/HR


 


 Heparin Sodium/


 Dextrose


  (Heparin 25,000


 Unit/500ml D5W)  25,000 unit  STK-MED ONCE


 .ROUTE  3/17/18 12:42


 3/17/18 12:43 DC 3/17/18 13:06


25,000 UNIT











ECG Per My Interpretation


Indication:  weakness


Rate (beats per minute):  89


Rhythm:  sinus rhythm


Findings:  LBBB, PAC, left axis deviation, other (No sgarbossa criteria)





ED Course


0947: The patient was evaluated in room A11. A complete history and physical 

exam was performed.





1214: I reevaluated and updated the patient. 





1228: Upon reexamination, the patient was stable. I discussed the test results 

and treatment plan with her. Discussed the patient's case with Dr. Trejo - 

Hospitalist, [facility]. The patient will be evaluated for further management.





Medical Decision


I reviewed the patient's past medical history, medications, and the nursing 

notes as described above.





Differential Diagnoses Include: periorbital cellulitis, orbital cellulitis, 

abscess, PTA/RPA, Darren angina, pneumonia, bronchitis, UTI, sepsis, fracture, 

ICH.





The patient is a 77-year-old woman with a past medical history of prior sinus 

infection and facial cellulitis who presents emergency department with 

worsening facial swelling pain, redness and warmth that began yesterday morning 

now with evolving generalized weakness having a fall this morning onto her 

knees per hpi rather the patient is fatigued appearing but no acute distress, 

temperature is 37.8, heart rate 100s and vital signs otherwise stable.  On exam 

the patient has diffuse bilateral facial swelling with erythema and warmth, out 

induration to the left side of the face and periorbital region.  No crepitus.  

No proptosis.  EOMI. no oral pharyngeal edema, trismus or tongue elevation.  No 

stridor.  Of note, patient reports having had the zoster vaccine 4 years ago.  

Rash does not demonstrate any vesicles at this time.  Otherwise regarding the 

patient's fall she denies any head strike but does report some upper neck pain.

  She is not on any anticoagulation. Pain to bilateral knees although she has 

osteoarthritis at baseline.Patient is equivocal whether or not this is worse 

from her baseline. EKG demonstrates old LBBB with no evidence of acute ischemia 

e.g. no Sgarbossa criteria. Trop elevated to 2.6. However, also in the setting 

of infection/sepsis with WBC 19 and lactate 2.7. CT head, face, neck with no 

evidence of deep infection. Patient treated with Vanc/Zosyn. Additionally, 

given troponin elevation will treat for NSTEMI with heparin gtt without bolus. 

Otherwise, plain films of lower extremities negative for fx. Case d/w Rosie Huynh hospitalist, who will admit the patient for further management.





Medication Reconcilliation


Current Medication List:  was personally reviewed by me





Blood Pressure Screening


Patient's blood pressure:  Elevated blood pressure


Blood pressure disposition:  Elevated BP felt to be situational





Consults


Time Called:  1226


Consulting Physician:  Rosie Lomax


Returned Call:  1228


I discussed the patient with Rosie Lomax. He will 

evaluate the patient for further treatment.





Impression





 Primary Impression:  


 NSTEMI (non-ST elevated myocardial infarction)


 Additional Impressions:  


 Sepsis


 Periorbital cellulitis





Critical Care


I have personally spent greater than 55 minutes of critical care time in the 

direct management of this patient.  This includes bedside care, interpretation 

of diagnostic studies, and testing, discussion with consultants, patient, and 

family members, and other required patient management activities.  This 55 

minutes is in excess of all separately billable procedures.





Scribe Attestation


The scribe's documentation has been prepared under my direction and personally 

reviewed by me in its entirety. I confirm that the note above accurately 

reflects all work, treatment, procedures, and medical decision making performed 

by me.





Departure Information


Dispostion


Being Evaluated By Hospitalist





Referrals


Cata Lozada M.D. (PCP)





Patient Instructions


My [facility]





Problem Qualifiers

## 2018-03-17 NOTE — DIAGNOSTIC IMAGING REPORT
L KNEE 3 VIEWS



CLINICAL HISTORY: pain fall    



COMPARISON: None.



DISCUSSION: Generalized significant degenerative change all major joint

compartments. No evidence for fracture. Moderate osteophytic changes throughout.

There is no evidence for soft tissue swelling.



IMPRESSION: Significant degenerative change all major joint compartments. No

acute bony abnormality.











The above report was generated using voice recognition software.  It may contain

grammatical, syntax or spelling errors.







Electronically signed by:  Rosalio Foster M.D.

3/17/2018 11:07 AM



Dictated Date/Time:  3/17/2018 11:06 AM

## 2018-03-17 NOTE — DIAGNOSTIC IMAGING REPORT
L TIBIA/FIBULA 2 VIEWS ROUTINE



CLINICAL HISTORY: pain fall trauma. Pain.



COMPARISON: None.



DISCUSSION: The bones and joint spaces appear intact. There is no evidence of

fracture, dislocation or bony disease. There is no evidence for soft tissue

swelling.



IMPRESSION: Negative study.











The above report was generated using voice recognition software.  It may contain

grammatical, syntax or spelling errors.







Electronically signed by:  Rosalio Foster M.D.

3/17/2018 11:06 AM



Dictated Date/Time:  3/17/2018 11:06 AM

## 2018-03-17 NOTE — HISTORY AND PHYSICAL
History & Physical


Date & Time of Service:


Mar 17, 2018 at 13:14


Chief Complaint:


Fall/Eye Pain


Primary Care Physician:


Cata Lozada M.D.


History of Present Illness


Source:  patient, family, clinic records, hospital records


This is a 77 year old female with a PMH of CAD, HTN, HLD, THU on nocturnal CPAP

, hx. of vertigo - presents after a fall this morning. States that she woke up 

when it was dark and she reached for the lights and she fell and hit her knees 

and fell to the ground. She has chronic ambulatory dysfunction uses a cane at 

baseline, but tells me when she first got up, she did not have that next to 

her. Her  lives with her and when he came up to the bedroom he tried to 

get her up but she was too weak. She was awake and alert the whole time as per 

; wife states she did not pass out. Patient states that yesterday, her 

face "blew up" and became swollen and red. She does not know why this happened 

as she did not eat anything unusual or have any season or environmental 

allergies. She states that she has had this cellulitis in the past, when it was 

on her face and throughout her body. She states there is no visual loss; denies 

chest pain - has intermittent dyspnea with exertion. Denies palpitations. Has 

some nausea the past day. Denies vomiting or diarrhea. No fevers/chills.





Past Medical/Surgical History


Medical Problems:


(1) Skin infection


Surgical Problems:


(1) History of cataract surgery








Social History


Smoking Status:  Never Smoker


Marital Status:  


Occupational Status:  retired





Allergies


Coded Allergies:  


     Doxycycline (Verified  Allergy, Unknown, GI UPSET, 3/17/18)


     Latex1 -Allergic Contact Dermititis (Verified  Allergy, Unknown, SLIGHT 

RASH, 3/17/18)


     Meperidine (Verified  Allergy, Unknown, GI UPSET, 3/17/18)


     Propoxyphene (Verified  Allergy, Unknown, GI UPSET, 3/17/18)


     Sulfa Antibiotics (Verified  Allergy, Unknown, GI UPSET, 3/17/18)





Home Medications


Scheduled


Albuterol Hfa (Ventolin Hfa), 2 PUFFS INH QID


Allopurinol (Zyloprim), 100 MG PO BID


Aspirin (Aspirin 81), 81 MG PO DAILY


Atenolol (Tenormin), 12.5 MG PO DAILY


Atorvastatin (Lipitor), 40 MG PO DAILY


Calcium Carbonate-Vitamin D (Calcium + D), 1 TAB PO DAILY


Clobetasol Propionate (Temovate), 1 APPLN TOP 4XWK


Felodipine (Plendil Er), 5 MG PO QAM


Hydrochlorothiazide (Hctz), 25 MG PO QAM


Ketoconazole (Topical) (Ketoconazole), 1 APPLN TOP 2XWK


Lisinopril (Prinivil), 10 MG PO BID


Magnesium (Magnesium), 1 TAB PO DAILY


Multivitamin (Multivitamin), 1 TAB PO DAILY


Omeprazole (Omeprazole), 1 TAB PO QAM


Terazosin Hcl (Hytrin), 2 MG PO QPM





Scheduled PRN


Cetirizine (Zyrtec), 10 MG PO DAILY PRN for Allergic Reaction


Furosemide (Lasix), 20 MG PO 2XWK PRN for FLUID ACCUMULATION


Meclizine Hcl (Meclizine Hcl), 25 MG PO TID PRN for Dizziness or Vertigo


Meclizine Hcl (Meclizine Hcl), 25 MG PO UD PRN for Dizziness or Vertigo


Saline (Ocean Nasal [place]), 1 DOSE INTNAS UD PRN for CONGESTION





Miscellaneous Medications


Albuterol Sulf (Ventolin)





Review of Systems


Constitutional:  + weakness, + fatigue, No fever, No chills


Eyes:  No worsening of vision, No eye pain, No redness


ENT:  + problem reported (swelling/redness of face for the past day), No 

hearing loss, No unusual epistaxis, No nasal symptoms, No sore throat


Respiratory:  + dyspnea on exertion, No cough, No sputum, No wheezing, No 

shortness of breath, No dyspnea at rest, No hemoptysis


Abdomen:  + nausea, No pain, No vomiting, No diarrhea, No constipation, No GI 

bleeding


Musculoskeletal:  + joint pain, + swelling (facial swelling)


Genitourinary - Female:  No dysuria, No urinary frequency, No urinary urgency, 

No urinary incontinence, No urinary retention, No hematuria


Neurologic:  + weakness, + vertigo, + balance problems, No memory loss, No 

paralysis, No numbness/tingling


Psychiatric:  No depression symptoms, No anxiety, No insomnia


Endocrine:  + fatigue, No excessive thirst, No excessive urination


Hematologic / Lymphatic:  No abnormal bleeding/bruising


Integumentary:  + rash, No itch


Allergic / Immunologic:  No environmental allergies, No seasonal allergies





Physical Exam


Vital Signs











  Date Time  Temp Pulse Resp B/P (MAP) Pulse Ox O2 Delivery O2 Flow Rate FiO2


 


3/17/18 11:34  79 18 148/83 94 Nasal Cannula 2.0 


 


3/17/18 10:22  87 24 115/61 93 Nasal Cannula 2.0 


 


3/17/18 10:03     94 Nasal Cannula 2.0 


 


3/17/18 09:54  93      


 


3/17/18 09:48 37.8 70 12 105/65 85 Room Air  








General Appearance:  no apparent distress


Head:  normocephalic, atraumatic


Eyes:  PERRL, EOMI, + pertinent finding (significant periorbital cellulitis; 

swelling. L eye is almost shut; though good EOM; preseptal cellulitis 

throughout facial region)


Neck:  supple


Respiratory/Chest:  chest non-tender, lungs clear, normal breath sounds, no 

respiratory distress, no accessory muscle use


Cardiovascular:  regular rate, rhythm, no edema, no gallop, no JVD, no murmur, 

normal peripheral pulses


Abdomen/GI:  normal bowel sounds, non tender, soft, no organomegaly


Extremities/Musculoskelatal:  normal inspection, no calf tenderness, normal 

capillary refill, no pedal edema, normal range of motion


Neurologic/Psych:  CNs II-XII nml as tested, no motor/sensory deficits, alert, 

normal mood/affect, oriented x 3


Skin:  + pertinent finding (see above regarding periorbital cellulitis, 

otherwise, mild cuts and abrasions note)


Lymphatic:  no adenopathy





Diagnostics


Laboratory Results





Results Past 24 Hours








Test


  3/17/18


10:22 3/17/18


10:28 Range/Units


 


 


White Blood Count 19.51  4.8-10.8  K/uL


 


Red Blood Count 4.31  4.2-5.4  M/uL


 


Hemoglobin 13.2  12.0-16.0  g/dL


 


Hematocrit 37.0  37-47  %


 


Mean Corpuscular Volume 85.8    fL


 


Mean Corpuscular Hemoglobin 30.6  25-34  pg


 


Mean Corpuscular Hemoglobin


Concent 35.7


  


  32-36  g/dl


 


 


Platelet Count 150  130-400  K/uL


 


Mean Platelet Volume 10.7  7.4-10.4  fL


 


Neutrophils (%) (Auto) 92.4   %


 


Lymphocytes (%) (Auto) 2.4   %


 


Monocytes (%) (Auto) 4.7   %


 


Eosinophils (%) (Auto) 0.0   %


 


Basophils (%) (Auto) 0.1   %


 


Neutrophils # (Auto) 18.06  1.4-6.5  K/uL


 


Lymphocytes # (Auto) 0.46  1.2-3.4  K/uL


 


Monocytes # (Auto) 0.91  0.11-0.59  K/uL


 


Eosinophils # (Auto) 0.00  0-0.5  K/uL


 


Basophils # (Auto) 0.01  0-0.2  K/uL


 


RDW Standard Deviation 44.8  36.4-46.3  fL


 


RDW Coefficient of Variation 14.1  11.5-14.5  %


 


Immature Granulocyte % (Auto) 0.4   %


 


Immature Granulocyte # (Auto) 0.07  0.00-0.02  K/uL


 


Venous Blood pH 7.45  7.36-7.41  


 


Venous Blood Partial Pressure


CO2 38


  


  38.0-50.0  mmHg


 


 


Venous Blood Partial Pressure


O2 43


  


   mmHg


 


 


Venous Blood HCO3 26   mmol/L


 


Venous Blood Oxygen Saturation 78.2   %


 


Venous Blood Base Excess 1.9   mEq/L


 


Sodium Level 134  136-145  mmol/L


 


Potassium Level 3.5  3.5-5.1  mmol/L


 


Chloride Level 97    mmol/L


 


Carbon Dioxide Level 24  21-32  mmol/L


 


Anion Gap 13.0  3-11  mmol/L


 


Blood Urea Nitrogen 28  7-18  mg/dl


 


Creatinine


  1.55


  


  0.60-1.20


mg/dl


 


Est Creatinine Clear Calc


Drug Dose 27.8


  


   ml/min


 


 


Estimated GFR (


American) 36.8


  


   


 


 


Estimated GFR (Non-


American 31.7


  


   


 


 


BUN/Creatinine Ratio 18.3  10-20  


 


Random Glucose 150  70-99  mg/dl


 


Lactic Acid Level 2.7  0.4-2.0  mmol/L


 


Calcium Level 9.4  8.5-10.1  mg/dl


 


Total Bilirubin 0.7  0.2-1  mg/dl


 


Direct Bilirubin 0.3  0-0.2  mg/dl


 


Aspartate Amino Transf


(AST/SGOT) 49


  


  15-37  U/L


 


 


Alanine Aminotransferase


(ALT/SGPT) 29


  


  12-78  U/L


 


 


Alkaline Phosphatase 120    U/L


 


Troponin I 2.630  0-0.045  ng/ml


 


Total Protein 7.3  6.4-8.2  gm/dl


 


Albumin 3.2  3.4-5.0  gm/dl


 


Lipase 140    U/L


 


Prothrombin Time


  


  11.2


  9.0-12.0


SECONDS


 


Prothromb Time International


Ratio 


  1.1


  0.9-1.1  


 


 


Activated Partial


Thromboplast Time 


  26.4


  21.0-31.0


SECONDS


 


Partial Thromboplastin Ratio  1.0  








Microbiology Results


3/17/18 Blood Culture, Received


          Pending


3/17/18 Blood Culture, Received


          Pending





Diagnostic Radiology


SOFT TISSUE NECK WITH





CLINICAL HISTORY: facial neck cellulitis pain, r/o deep infection    





TECHNIQUE: Transaxial acquisition with multi axial reformatted images





COMPARISON STUDY:  None





FINDINGS: Minimal to mild right perimandibular and submandibular infiltrative


changes subcutaneous fat and associated fascial planes. No evidence for true


mass or collection. Several small reactive nodes less than 6 mm.





The salivary glands including parotid and submandibular glands are unremarkable.





Glottic and subglottic regions are intact. There is no airway compromise.


Chronic apical fibrotic changes present. Is a subtle high density primarily


cystic nodule adjacent to the right T3-T4 neural foramina. This is felt to


represent a benign perineural cyst. There is mild apical fibrotic changes


bilaterally.





IMPRESSION:  


1. Mild nonspecific cellulitis of the right perimandibular and submandibular


region.


2. No evidence for drainable abscess or collection. 





CHEST 1 VW FRONT-NOT PORTABLE





CLINICAL HISTORY: FEVER/SEPSIS. CHANGED PORT TO DEPT    





COMPARISON STUDY:  No previous studies for comparison.





FINDINGS: Mild cardiomegaly. Lungs are grossly clear. Slight basilar


interstitial prominence considered nonspecific. 





IMPRESSION:  Slight bibasilar interstitial prominence. Otherwise negative study.


Mild cardia megaly.





EKG


Sinus rhythm with Premature atrial complexes


Left axis deviation


Left bundle branch block





Impression


Assessment and Plan


This is a 77 year old female with a PMH of CAD, HTN, HLD, THU on nocturnal CPAP

, hx. of vertigo - presents after a fall this morning, subsequent discovery of 

significant periorbital cellulitis, sepsis, elevated troponin, acute kidney 

injury





Sepsis secondary to Preseptal Cellulitis


3/17


* Facial and soft tissue neck CT obtained - showing Mild nonspecific cellulitis 

of the right perimandibular and submandibular region.


* no abscess noted on CT


* patient with significant white count elevated, lactic acidosis, low grade 

fever


* started on IV Zosyn and IV Vanco


* blood cultures pending


* ID consulted for further input


* will recheck lactic acid in 4 hours and adjust fluids; for now, we can do 

125mL/hr of NS





Elevated Troponin


Hx. of ASCVD with medical management


Likely Type 2 MI in the setting of Sepsis


3/17


* as per outpatient cardiology notes; patient has been having dyspnea on 

exertion


* was scheduled for a routine stress test prior to an elective knee surgery in 

early 2018, though this was not done


* patient presents today without any chest pain or palpitations - does have 

intermittent shortness of breath


* troponin elevated to >2


* possibly due to the sepsis and increased oxygen demand


* will start low dose heparin ggt


* cardiology consulted for further input; will continue aspirin, statin, b-

blocker


* trend cardiac enzymes


* ordered for an updated echo





Mechanical Fall


Ambulatory Dysfunction at baseline


3/17


* patient uses cane at baseline


* will order PT/OT


* likely fall from dehydration/sepsis/infection as patient did not lose 

consciousness


* Head CT negative; radiographs negative for fractures





Acute Kidney Injury


3/17


* baseline creatinine at 1.0


* presented with creatinine of 1.5


* prerenal/dehydration - monitor for worsening as IV contrast was used for 

facial/neck CT


* bolus given in the ED, will start NS @ 125mL/hr





HTN


* blood pressure was low on admission


* will hold ACE-I, HCTZ due to kidney injury


* continue b-blocker and monitor





DVT ppx


* IV heparin





FULL CODE





Resuscitation Status








VTE Prophylaxis


Will order VTE Prophylaxis:  Yes

## 2018-03-17 NOTE — DIAGNOSTIC IMAGING REPORT
PELVIS/BILATERAL HIP 2 VIEWS



CLINICAL HISTORY: b/l hip pain fall pain. Trauma.



COMPARISON STUDY:  None



FINDINGS: Moderate degenerative change of the hips bilaterally. Moderate

degenerative sclerotic changes of the sacroiliac joints and symphysis pubis.



No acute bony abnormality. Degenerative changes low lumbar spine.



IMPRESSION:  Degenerative change. No acute process. 













The above report was generated using voice recognition software.  It may contain

grammatical, syntax or spelling errors.







Electronically signed by:  Rosalio Foster M.D.

3/17/2018 11:11 AM



Dictated Date/Time:  3/17/2018 11:10 AM

## 2018-03-17 NOTE — DIAGNOSTIC IMAGING REPORT
SOFT TISSUE NECK WITH



CLINICAL HISTORY: facial neck cellulitis pain, r/o deep infection    



TECHNIQUE: Transaxial acquisition with multi axial reformatted images



COMPARISON STUDY:  None



FINDINGS: Minimal to mild right perimandibular and submandibular infiltrative

changes subcutaneous fat and associated fascial planes. No evidence for true

mass or collection. Several small reactive nodes less than 6 mm.



The salivary glands including parotid and submandibular glands are unremarkable.



Glottic and subglottic regions are intact. There is no airway compromise.

Chronic apical fibrotic changes present. Is a subtle high density primarily

cystic nodule adjacent to the right T3-T4 neural foramina. This is felt to

represent a benign perineural cyst. There is mild apical fibrotic changes

bilaterally.



IMPRESSION:  

1. Mild nonspecific cellulitis of the right perimandibular and submandibular

region.

2. No evidence for drainable abscess or collection. 









The above report was generated using voice recognition software.  It may contain

grammatical, syntax or spelling errors.







Electronically signed by:  Rosalio Foster M.D.

3/17/2018 11:58 AM



Dictated Date/Time:  3/17/2018 11:54 AM

## 2018-03-17 NOTE — DIAGNOSTIC IMAGING REPORT
R TIBIA/FIBULA 2 VIEWS ROUTINE



CLINICAL HISTORY: pain fall trauma



COMPARISON: None.



DISCUSSION: The bones and joint spaces appear intact. There is no evidence of

fracture, dislocation or bony disease. There is no evidence for soft tissue

swelling.



IMPRESSION: Negative study.











The above report was generated using voice recognition software.  It may contain

grammatical, syntax or spelling errors.







Electronically signed by:  Rosalio Foster M.D.

3/17/2018 11:05 AM



Dictated Date/Time:  3/17/2018 11:02 AM

## 2018-03-18 VITALS
DIASTOLIC BLOOD PRESSURE: 88 MMHG | OXYGEN SATURATION: 99 % | SYSTOLIC BLOOD PRESSURE: 134 MMHG | HEART RATE: 87 BPM | TEMPERATURE: 98.96 F

## 2018-03-18 VITALS
OXYGEN SATURATION: 92 % | TEMPERATURE: 98.6 F | HEART RATE: 67 BPM | SYSTOLIC BLOOD PRESSURE: 112 MMHG | DIASTOLIC BLOOD PRESSURE: 72 MMHG

## 2018-03-18 VITALS
HEART RATE: 77 BPM | SYSTOLIC BLOOD PRESSURE: 169 MMHG | TEMPERATURE: 98.6 F | DIASTOLIC BLOOD PRESSURE: 92 MMHG | OXYGEN SATURATION: 92 %

## 2018-03-18 VITALS
OXYGEN SATURATION: 93 % | HEART RATE: 74 BPM | SYSTOLIC BLOOD PRESSURE: 149 MMHG | TEMPERATURE: 98.78 F | DIASTOLIC BLOOD PRESSURE: 79 MMHG

## 2018-03-18 VITALS — OXYGEN SATURATION: 93 %

## 2018-03-18 VITALS
SYSTOLIC BLOOD PRESSURE: 136 MMHG | OXYGEN SATURATION: 98 % | DIASTOLIC BLOOD PRESSURE: 80 MMHG | TEMPERATURE: 98.42 F | HEART RATE: 76 BPM

## 2018-03-18 VITALS
HEART RATE: 108 BPM | DIASTOLIC BLOOD PRESSURE: 97 MMHG | OXYGEN SATURATION: 94 % | TEMPERATURE: 101.3 F | SYSTOLIC BLOOD PRESSURE: 174 MMHG

## 2018-03-18 VITALS — TEMPERATURE: 100.22 F

## 2018-03-18 LAB
BASOPHILS # BLD: 0.01 K/UL (ref 0–0.2)
BASOPHILS NFR BLD: 0.1 %
BUN SERPL-MCNC: 19 MG/DL (ref 7–18)
BUN SERPL-MCNC: 19 MG/DL (ref 7–18)
CALCIUM SERPL-MCNC: 8.1 MG/DL (ref 8.5–10.1)
CALCIUM SERPL-MCNC: 8.4 MG/DL (ref 8.5–10.1)
CO2 SERPL-SCNC: 24 MMOL/L (ref 21–32)
CO2 SERPL-SCNC: 26 MMOL/L (ref 21–32)
CREAT SERPL-MCNC: 0.83 MG/DL (ref 0.6–1.2)
CREAT SERPL-MCNC: 0.89 MG/DL (ref 0.6–1.2)
EOS ABS #: 0 K/UL (ref 0–0.5)
EOSINOPHIL NFR BLD AUTO: 126 K/UL (ref 130–400)
EOSINOPHIL NFR BLD AUTO: 151 K/UL (ref 130–400)
GLUCOSE SERPL-MCNC: 118 MG/DL (ref 70–99)
GLUCOSE SERPL-MCNC: 134 MG/DL (ref 70–99)
HCT VFR BLD CALC: 31.5 % (ref 37–47)
HCT VFR BLD CALC: 35 % (ref 37–47)
HGB BLD-MCNC: 10.8 G/DL (ref 12–16)
HGB BLD-MCNC: 11.8 G/DL (ref 12–16)
IG#: 0.07 K/UL (ref 0–0.02)
IMM GRANULOCYTES NFR BLD AUTO: 3.2 %
LYMPHOCYTES # BLD: 0.6 K/UL (ref 1.2–3.4)
MCH RBC QN AUTO: 29 PG (ref 25–34)
MCH RBC QN AUTO: 29.3 PG (ref 25–34)
MCHC RBC AUTO-ENTMCNC: 33.7 G/DL (ref 32–36)
MCHC RBC AUTO-ENTMCNC: 34.3 G/DL (ref 32–36)
MCV RBC AUTO: 85.6 FL (ref 80–100)
MCV RBC AUTO: 86 FL (ref 80–100)
MONO ABS #: 1.06 K/UL (ref 0.11–0.59)
MONOCYTES NFR BLD: 5.7 %
NEUT ABS #: 16.8 K/UL (ref 1.4–6.5)
NEUTROPHILS # BLD AUTO: 0 %
NEUTROPHILS NFR BLD AUTO: 90.6 %
PMV BLD AUTO: 10.8 FL (ref 7.4–10.4)
PMV BLD AUTO: 11.1 FL (ref 7.4–10.4)
POTASSIUM SERPL-SCNC: 2.9 MMOL/L (ref 3.5–5.1)
POTASSIUM SERPL-SCNC: 3.5 MMOL/L (ref 3.5–5.1)
PTT PATIENT: 41.1 SECONDS (ref 21–31)
PTT PATIENT: 50.2 SECONDS (ref 21–31)
RED CELL DISTRIBUTION WIDTH CV: 14.4 % (ref 11.5–14.5)
RED CELL DISTRIBUTION WIDTH CV: 14.4 % (ref 11.5–14.5)
RED CELL DISTRIBUTION WIDTH SD: 45.2 FL (ref 36.4–46.3)
RED CELL DISTRIBUTION WIDTH SD: 45.4 FL (ref 36.4–46.3)
SODIUM SERPL-SCNC: 136 MMOL/L (ref 136–145)
SODIUM SERPL-SCNC: 136 MMOL/L (ref 136–145)
WBC # BLD AUTO: 17.49 K/UL (ref 4.8–10.8)
WBC # BLD AUTO: 18.54 K/UL (ref 4.8–10.8)

## 2018-03-18 RX ADMIN — VANCOMYCIN HYDROCHLORIDE SCH MLS/HR: 1 INJECTION, POWDER, LYOPHILIZED, FOR SOLUTION INTRAVENOUS at 20:55

## 2018-03-18 RX ADMIN — IPRATROPIUM BROMIDE AND ALBUTEROL SULFATE SCH ML: .5; 3 SOLUTION RESPIRATORY (INHALATION) at 19:55

## 2018-03-18 RX ADMIN — CETIRIZINE HYDROCHLORIDE PRN MG: 10 TABLET, FILM COATED ORAL at 08:03

## 2018-03-18 RX ADMIN — POTASSIUM CHLORIDE SCH MLS/HR: 10 INJECTION, SOLUTION INTRAVENOUS at 09:07

## 2018-03-18 RX ADMIN — ACETAMINOPHEN PRN MG: 325 TABLET ORAL at 07:44

## 2018-03-18 RX ADMIN — IPRATROPIUM BROMIDE AND ALBUTEROL SULFATE SCH ML: .5; 3 SOLUTION RESPIRATORY (INHALATION) at 06:57

## 2018-03-18 RX ADMIN — ALBUTEROL SULFATE SCH PUFFS: 90 AEROSOL, METERED RESPIRATORY (INHALATION) at 07:48

## 2018-03-18 RX ADMIN — ALBUTEROL SULFATE SCH PUFFS: 90 AEROSOL, METERED RESPIRATORY (INHALATION) at 13:09

## 2018-03-18 RX ADMIN — PIPERACILLIN AND TAZOBACTAM SCH MLS/HR: 3; .375 INJECTION, POWDER, LYOPHILIZED, FOR SOLUTION INTRAVENOUS; PARENTERAL at 07:47

## 2018-03-18 RX ADMIN — FAMOTIDINE SCH MLS/MIN: 10 INJECTION INTRAVENOUS at 09:06

## 2018-03-18 RX ADMIN — HEPARIN SODIUM SCH MLS/HR: 5000 INJECTION, SOLUTION INTRAVENOUS at 16:19

## 2018-03-18 RX ADMIN — ALBUTEROL SULFATE SCH PUFFS: 90 AEROSOL, METERED RESPIRATORY (INHALATION) at 16:19

## 2018-03-18 RX ADMIN — PIPERACILLIN AND TAZOBACTAM SCH MLS/HR: 3; .375 INJECTION, POWDER, LYOPHILIZED, FOR SOLUTION INTRAVENOUS; PARENTERAL at 16:19

## 2018-03-18 RX ADMIN — FELODIPINE SCH MG: 5 TABLET, FILM COATED, EXTENDED RELEASE ORAL at 07:45

## 2018-03-18 RX ADMIN — IPRATROPIUM BROMIDE AND ALBUTEROL SULFATE SCH ML: .5; 3 SOLUTION RESPIRATORY (INHALATION) at 11:19

## 2018-03-18 RX ADMIN — FAMOTIDINE SCH MLS/MIN: 10 INJECTION INTRAVENOUS at 20:14

## 2018-03-18 RX ADMIN — PANTOPRAZOLE SCH MG: 40 TABLET, DELAYED RELEASE ORAL at 07:45

## 2018-03-18 RX ADMIN — SODIUM CHLORIDE SCH MLS/HR: 900 INJECTION, SOLUTION INTRAVENOUS at 03:26

## 2018-03-18 RX ADMIN — ALBUTEROL SULFATE SCH PUFFS: 90 AEROSOL, METERED RESPIRATORY (INHALATION) at 20:14

## 2018-03-18 RX ADMIN — IPRATROPIUM BROMIDE AND ALBUTEROL SULFATE SCH ML: .5; 3 SOLUTION RESPIRATORY (INHALATION) at 15:19

## 2018-03-18 RX ADMIN — Medication SCH MG: at 07:44

## 2018-03-18 RX ADMIN — POTASSIUM CHLORIDE SCH MLS/HR: 10 INJECTION, SOLUTION INTRAVENOUS at 10:22

## 2018-03-18 RX ADMIN — ACETAMINOPHEN PRN MG: 325 TABLET ORAL at 17:59

## 2018-03-18 RX ADMIN — ATORVASTATIN CALCIUM SCH MG: 40 TABLET, FILM COATED ORAL at 07:44

## 2018-03-18 NOTE — ECHOCARDIOGRAM REPORT
*NOTICE TO RECEIVING PARTY AGENCY**  This information is strictly Confidential and protected under 
[place] law.  [place] law prohibits you from making any further disclosure of this 
information unless further disclosure is expressly permitted by the written consent of the person to 
whom it pertains or is authorized by law.  A general authorization for the release of medical or 
other information is not sufficient for this purpose.  Hospital accepts no responsibility if the 
information is made available to any other person, INCLUDING THE PATIENT.



Interpretation Summary

  *  Name: EDUARDO OVERTON  Study Date: 2018 03:57 PM  BP: 148/83 mmHg

  *  MRN: [id number]  Patient Location: C.2T\S\S242\S\2  HR: 105

  *  : 1940 (M/d[birth year]  Gender: Female  Height: 61 in

  *  Age: 77 yrs  Ethnicity: [place]  Weight: 166 lb

  *  Ordering Physician: Ayaz Trejo

  *  Referring Physician: No Doctor, Assigned

  *  Performed By: Palomo Minor RDCS

  *  Accession# [id number]  Account# [id number]

  *  Reason For Study: Elevated troponins, Hx CAD

  *  BSA: 1.7 m2

  *  The study was technically difficult.

  *  There is no comparison study available.

  *  -- Conclusions --

  *  Left ventricular systolic function is moderately reduced.

  *  Ejection Fraction = 30-35%.

  *  Septal motion is consistent with conduction abnormality.

  *  Otherwise moderate diffuse hypokinesis.

  *  The left atrium is moderately dilated.

  *  Aortic valve sclerosis mild, without significant aortic valvular stenosis.

  *  There is moderate to severe mitral annular calcification.

  *  There is mild mitral regurgitation.

Procedure Details

  *  A complete two-dimensional transthoracic echocardiogram was performed (2D, M-mode, Doppler and 
color flow Doppler).

  *  The study was technically difficult, but visualization was adequate with the administration of 
Definity ultrasound contrast.

  *  There were technical limitations due to patient'spoor positioning

  *  A contrast injection of Definity was performed to improve assessment of LV function.

  *  Contrast was injected into an intravenous site in the right arm.

  *  One vial of Definity ultrasound contrast was diluted in normal saline to a total volume of 10 
ml.  A total of '2' ml of solution was administered during imaging.

  *  Lot # [id number] of Definity utilized for procedure.

  *  Expiration date .

  *  The attending nurse who injected the contrast agent was TINO Pickering.

Left Ventricle

  *  The left ventricle is normal in size.

  *  There is no thrombus.

  *  There is normal left ventricular wall thickness.

  *  Ejection Fraction = 30-35%.

  *  Left ventricular systolic function is moderately reduced.

  *  Septal motion is consistent with conduction abnormality.

  *  Otherwise moderate diffuse hypokinesis.

Right Ventricle

  *  The right ventricle is normal size.

  *  The right ventricular systolic function is normal as assessed by tricuspid annular plane 
systolic excursion (TAPSE) (normal >1.5 cm).

Atria

  *  The left atrium is moderately dilated.

  *  Right atrial size is normal.

  *  There is no evidence of atrial septal defect, but resolution does not allow assessment for a 
patent foramen ovale.

Mitral Valve

  *  There is moderate to severe mitral annular calcification.

  *  There is no mitral valve stenosis.

  *  There is mild mitral regurgitation.

Tricuspid Valve

  *  The tricuspid valve is normal.

  *  There is no tricuspid stenosis.

  *  Significant tricuspid regurgitation is absent.

Aortic Valve

  *  Aortic valve sclerosis mild, without significant aortic valvular stenosis.

  *  Aortic stenosis is absent.

  *  There is no significant aortic regurgitation.

Pulmonic Valve

  *  The pulmonary valve is not well seen, but the Doppler examination is normal without significant 
regurgitation or stenosis.

  *  Mild pulmonic valvular regurgitation.

Great Vessels

  *  The aortic root is normal size.

Pericardium/Pleural

  *  There is no pericardial effusion.

Great Vessels

  *  Normal inferior vena cava diameter and respiratory variation suggests normal central venous 
pressure.

Left Ventricular Diastolic Function

  *  Grade I diastolic dysfunction, (abnormal relaxation pattern).



MMode 2D Measurements and Calculations

IVSd 1.0 cm

IVSs 1.4 cm



LVIDd 4.7 cm

LVIDs 3.6 cm

LVPWd 1.1 cm

LVPWs 1.7 cm



IVS/LVPW 0.97 

FS 24.1 %

EDV(Teich) 103.7 ml

ESV(Teich) 53.9 ml

EF(Teich) 48.0 %



EDV(cubed) 105.5 ml

ESV(cubed) 46.1 ml

EF(cubed) 56.3 %

% IVS thick 31.6 %

% LVPW thick 55.3 %





LV mass(C)d 179.6 grams

LV mass(C)dI 102.9 grams/m\S\2

LV mass(C)s 204.5 grams

LV mass(C)sI 117.2 grams/m\S\2



SV(Teich) 49.8 ml

SI(Teich) 28.5 ml/m\S\2

SV(cubed) 59.4 ml

SI(cubed) 34.1 ml/m\S\2



EPSS 1.7 cm



Ao root diam 3.3 cm

Ao root area 8.6 cm\S\2

ACS 1.7 cm

LA dimension 4.9 cm





asc Aorta Diam 3.5 cm



LA/Ao 1.5 

LVOT diam 2.0 cm

LVOT area 3.0 cm\S\2



LVAd ap4 32.1 cm\S\2

LVLd ap4 8.3 cm

EDV(MOD-sp4) 104.5 ml

EDV(sp4-el) 106.0 ml

LVAs ap4 25.4 cm\S\2

LVLs ap4 7.9 cm

ESV(MOD-sp4) 70.4 ml

ESV(sp4-el) 69.2 ml

EF(MOD-sp4) 32.7 %

EF(sp4-el) 34.7 %



LVAd ap2 30.9 cm\S\2

LVLd ap2 8.3 cm

EDV(MOD-sp2) 99.2 ml

EDV(sp2-el) 98.3 ml

LVAs ap2 23.5 cm\S\2

LVLs ap2 7.5 cm

ESV(MOD-sp2) 62.6 ml

ESV(sp2-el) 62.3 ml

EF(MOD-sp2) 36.9 %

EF(sp2-el) 36.6 %





LVLd %diff 0.01 %

EDV(MOD-bp) 103.0 ml

LVLs %diff -5.40 %

ESV(MOD-bp) 67.5 ml

EF(MOD-bp) 34.5 %



SV(MOD-sp4) 34.1 ml

SI(MOD-sp4) 19.6 ml/m\S\2



SV(MOD-sp2) 36.6 ml

SI(MOD-sp2) 21.0 ml/m\S\2



SV(MOD-bp) 35.5 ml

SI(MOD-bp) 20.4 ml/m\S\2





SV(sp4-el) 36.8 ml

SI(sp4-el) 21.1 ml/m\S\2



SV(sp2-el) 36.0 ml

SI(sp2-el) 20.6 ml/m\S\2











Doppler Measurements and Calculations

MV E max bon 99.6 cm/sec

MV A max bon 154.0 cm/sec



MV E/A 0.65 



MV V2 max 166.1 cm/sec

MV max PG 11.0 mmHg

MV V2 mean 82.9 cm/sec

MV mean PG 3.5 mmHg

MV V2 VTI 43.3 cm



MV dec time 0.17 sec





Ao V2 max 163.2 cm/sec

Ao max PG 10.7 mmHg

Ao max PG (full) 6.1 mmHg

REGGIE(V,A) 2.0 cm\S\2

REGGIE(V,D) 2.0 cm\S\2



LV V1 max PG 4.5 mmHg



LV V1 max 106.6 cm/sec



PA V2 max 107.8 cm/sec

PA max PG 4.7 mmHg

PA acc slope 996.5 cm/sec\S\2

PA acc time 0.08 sec





PA pr(Accel) 41.5 mmHg

## 2018-03-18 NOTE — MEDICAL CONSULT
Consultation


Date of Consultation:


Mar 18, 2018.


Attending Physician:


Ayaz Trejo DO


Reason for Consultation:


Periorbital cellulitis


History of Present Illness


77-year-old female with history of prior episode of facial cellulitis spreading 

extensively in 2005, who was well until the day of admission when after fall 

she awoke with acute onset severe facial swelling and erythema associated with 

fever and chills.  Felt unwell with body aches.  Came to the emergency 

department where she was found to have evidence of facial cellulitis, with scan 

not suggestive of orbital cellulitis.  Patient has been started empirically on 

vancomycin and Zosyn and has noted some improvement.  Temperature has improved.

  Swelling somewhat worse, erythema has diminished somewhat overnight.  Appears 

to be tolerating antibiotic without apparent difficulty.





Past Medical/Surgical History


Medical Problems:


(1) NSTEMI (non-ST elevated myocardial infarction)


Status: Acute  





(2) Periorbital cellulitis


Status: Acute  





(3) Sepsis


Status: Acute  








Medical Problems:


(1) Skin infection


Surgical Problems:


(1) History of cataract surgery











Family History


Noncontributory





Social History


Smoking Status:  Never Smoker


Marital Status:  


Housing Status:  lives with family


Occupation Status:  retired





Allergies


Coded Allergies:  


     Latex1 -Allergic Contact Dermititis (Verified  Allergy, Unknown, SLIGHT 

RASH, 3/17/18)


     Doxycycline (Unverified  Adverse Reaction, Unknown, GI UPSET, 3/17/18)


     Meperidine (Unverified  Adverse Reaction, Unknown, GI UPSET, 3/17/18)


     Propoxyphene (Unverified  Adverse Reaction, Unknown, GI UPSET, 3/17/18)


     Sulfa Antibiotics (Unverified  Adverse Reaction, Unknown, GI UPSET, 3/17/18

)





Current Inpatient Medications





Current Inpatient Medications








 Medications


  (Trade)  Dose


 Ordered  Sig/Sirisha


 Route  Start Time


 Stop Time Status Last Admin


Dose Admin


 


 Ioversol


  (Optiray 320)  111 ml  UD  PRN


 IV  3/17/18 10:30


 3/21/18 10:29   


 


 


 Piperacillin Sod/


 Tazobactam Sod


 3.375 gm/Dextrose  115 ml @ 


 28.75 mls/


 hr  Q8H


 IV  3/17/18 16:00


 3/27/18 15:59  3/18/18 07:47


28.75 MLS/HR


 


 Miscellaneous


 Information


  (Consult)  1 ea  UD  PRN


 N/A  3/17/18 12:30


 4/16/18 12:29   


 


 


 Miscellaneous


 Information


  (Consult)  1 ea  UD  PRN


 N/A  3/17/18 12:30


 4/16/18 12:29   


 


 


 Acetaminophen


  (Tylenol Tab)  650 mg  Q4H  PRN


 PO  3/17/18 13:15


 4/16/18 13:14  3/18/18 07:44


650 MG


 


 Al Hydrox/Mg


 Hydrox/Simethicone


  (Maalox Max Susp)  15 ml  Q4H  PRN


 PO  3/17/18 13:15


 4/16/18 13:14   


 


 


 Magnesium


 Hydroxide


  (Milk Of


 Magnesia Susp)  30 ml  Q12H  PRN


 PO  3/17/18 13:15


 4/16/18 13:14   


 


 


 Ondansetron HCl


  (Zofran Inj)  4 mg  Q6H  PRN


 IV  3/17/18 13:15


 4/16/18 13:14   


 


 


 Albuterol


  (Ventolin Hfa


 Inhaler)  2 puffs  QID


 INH  3/17/18 17:00


 4/16/18 16:59  3/18/18 13:09


2 PUFFS


 


 Aspirin


  (Ecotrin Tab)  81 mg  DAILY


 PO  3/18/18 09:00


 4/17/18 08:59  3/18/18 07:44


81 MG


 


 Atorvastatin


 Calcium


  (Lipitor Tab)  40 mg  DAILY


 PO  3/18/18 09:00


 4/17/18 08:59  3/18/18 07:44


40 MG


 


 Cetirizine HCl


  (zyrTEC TAB)  10 mg  DAILY  PRN


 PO  3/17/18 13:45


 4/16/18 13:44  3/18/18 08:03


10 MG


 


 Felodipine


  (Plendil Tabcr)  5 mg  QAM


 PO  3/18/18 09:00


 4/17/18 08:59  3/18/18 07:45


5 MG


 


 Sodium Chloride


  (Ocean Nasal


 Spray)  2 sprays  UD  PRN


 NA  3/17/18 13:45


 4/16/18 13:44   


 


 


 Magnesium Oxide


  (Mag-Ox Tab)  200 mg  DAILY


 PO  3/18/18 09:00


 4/17/18 08:59  3/18/18 07:44


200 MG


 


 Pantoprazole


 Sodium


  (Protonix Tab)  40 mg  QAM


 PO  3/18/18 09:00


 4/17/18 08:59  3/18/18 07:45


40 MG


 


 Albuterol/


 Ipratropium


  (Duoneb)  3 ml  QIDR


 INH  3/17/18 16:00


 4/16/18 15:59  3/17/18 19:23


3 ML


 


 Albuterol/


 Ipratropium


  (Duoneb)  3 ml  Q2H  PRN


 INH  3/17/18 14:30


 4/16/18 14:29   


 


 


 Heparin Sodium/


 Dextrose  500 ml @ 


 15 mls/hr  Q24H


 IV  3/17/18 18:00


 4/16/18 17:59  3/17/18 18:11


14 MLS/HR


 


 Diphenhydramine


 HCl


  (Benadryl Inj)  50 mg  BID


 IV  3/18/18 09:00


 4/17/18 08:59  3/18/18 09:07


50 MG


 


 Famotidine 20 mg/


 Syringe  5 ml @ 2.5


 mls/min  Q12H


 IV  3/18/18 09:00


 4/17/18 08:59  3/18/18 09:06


2.5 MLS/MIN


 


 Vancomycin HCl


 1250 mg/Sodium


 Chloride  275 ml @ 


 125 mls/hr  Q18H


 IV  3/18/18 22:00


 3/28/18 21:59   


 


 


 Furosemide 20 mg/


 Syringe  2 ml @ 4


 mls/min  DAILY


 IV  3/19/18 09:00


 4/18/18 08:59   


 


 


 Lisinopril


  (Zestril Tab)  10 mg  QAM


 PO  3/19/18 09:00


 4/18/18 08:59   


 


 


 Metoprolol


 Succinate


  (Toprol Xl Tab)  25 mg  QAM


 PO  3/19/18 09:00


 4/18/18 08:59   


 











Review of Systems


Constitutional:  + fever, + chills, + weakness, + fatigue


Eyes:  + eye pain, + redness, + discharge


ENT:  No problem reported


Respiratory:  No problem reported


Cardiovascular:  No problem reported


Musculoskeletal:  + muscle pain


Genitourinary - Female:  No problem reported


Neurologic:  No problem reported


Psychiatric:  No problem reported


Endocrine:  No problem reported


Hematologic / Lymphatic:  No problem reported


Integumentary:  + new/changing skin lesions


Allergic / Immunologic:  No problem reported





Physical Exam











  Date Time  Temp Pulse Resp B/P (MAP) Pulse Ox O2 Delivery O2 Flow Rate FiO2


 


3/18/18 12:00      Nasal Cannula 2.0 


 


3/18/18 10:59 37.0 67 20 112/72 (85) 92 Nasal Cannula 2.0 


 


3/18/18 08:00      Nasal Cannula 2.0 


 


3/18/18 07:45 37.0 77 20 169/92 (117) 92 Nasal Cannula 2.0 


 


3/18/18 04:00     93 Nasal Cannula 2.0 


 


3/18/18 03:34 36.9 76 16 136/80 (98) 98 Room Air  


 


3/18/18 00:00     93 Nasal Cannula 2.0 


 


3/17/18 23:53 36.6 69 16 112/71 (85) 98   


 


3/17/18 20:00     93 Nasal Cannula 2.0 


 


3/17/18 19:51  92 16  97 Nasal Cannula 2.0 


 


3/17/18 19:26 37.4 80 18 134/70 (91) 96   


 


3/17/18 16:00     93 Nasal Cannula 2.0 


 


3/17/18 16:00 37.9 90 18 160/94 (116) 93 Nasal Cannula 2.0 








General Appearance:  WD/WN, no apparent distress, + obese


Head:  normocephalic, atraumatic


Eyes:  EOMI, sclerae normal, + pertinent finding (Left eye almost swollen shut 

with some purulence of lid margin)


ENT:  hearing grossly normal, pharynx normal


Neck:  supple, no adenopathy, thyroid normal, trachea midline


Respiratory/Chest:  chest non-tender, lungs clear, normal breath sounds, no 

respiratory distress


Cardiovascular:  regular rate, rhythm, no edema, no gallop, no murmur


Abdomen/GI:  normal bowel sounds, non tender, soft, no organomegaly


Back:  normal inspection, no CVA tenderness


Extremities/Musculoskelatal:  no calf tenderness, normal capillary refill, non-

tender


Neurologic/Psych:  alert, normal mood/affect, oriented x 3


Skin:  normal color, + pertinent finding (Bilateral facial cellulitis and 

periorbital cellulitis, with purulence on left eyelid)


Lymphatic:  no adenopathy





Laboratory Results





Last 24 Hours








Test


  3/17/18


19:03 3/18/18


00:17 3/18/18


01:51 3/18/18


06:53


 


Activated Partial


Thromboplast Time 37.7 SECONDS 


  


  41.1 SECONDS 


  50.2 SECONDS 


 


 


Partial Thromboplastin Ratio 1.5   1.6  1.9 


 


Lactic Acid Level 1.7 mmol/L    


 


Troponin I 1.680 ng/ml  1.180 ng/ml   0.910 ng/ml 


 


Procalcitonin 40.48 ng/ml    


 


White Blood Count    17.49 K/uL 


 


Red Blood Count    3.68 M/uL 


 


Hemoglobin    10.8 g/dL 


 


Hematocrit    31.5 % 


 


Mean Corpuscular Volume    85.6 fL 


 


Mean Corpuscular Hemoglobin    29.3 pg 


 


Mean Corpuscular Hemoglobin


Concent 


  


  


  34.3 g/dl 


 


 


RDW Standard Deviation    45.4 fL 


 


RDW Coefficient of Variation    14.4 % 


 


Platelet Count    126 K/uL 


 


Mean Platelet Volume    10.8 fL 


 


Sodium Level    136 mmol/L 


 


Potassium Level    2.9 mmol/L 


 


Chloride Level    104 mmol/L 


 


Carbon Dioxide Level    24 mmol/L 


 


Anion Gap    7.0 mmol/L 


 


Blood Urea Nitrogen    19 mg/dl 


 


Creatinine    0.83 mg/dl 


 


Est Creatinine Clear Calc


Drug Dose 


  


  


  51.8 ml/min 


 


 


Estimated GFR (


American) 


  


  


  78.3 


 


 


Estimated GFR (Non-


American 


  


  


  67.5 


 


 


BUN/Creatinine Ratio    22.6 


 


Random Glucose    118 mg/dl 


 


Calcium Level    8.1 mg/dl 


 


Magnesium Level    2.3 mg/dl 


 


Total Creatine Kinase    8202 U/L 


 


Test


  3/18/18


12:53 


  


  


 


 


Troponin I 0.863 ng/ml    








HEAD WITHOUT CONTRAST (CT)





CT DOSE: 1046.89 mGy.cm





HISTORY: Trauma  pain fall





TECHNIQUE: Multiaxial CT images of the head were performed without the use of


intravenous contrast.  A dose lowering technique was utilized adhering to the


principles of ALARA.








Comparison: None.





Findings: The paranasal sinuses and mastoid air cells are clear. The calvarium


and skull base are intact. The ventricles and sulci are within normal limits.


There is no mass, hematoma, midline shift, or acute infarct. 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


periventricular infarct. Mild age-related chronic small vessel change.





Impression:


No acute intracranial abnormality. Chronic and age-related change.

















The above report was generated using voice recognition software.  It may contain


grammatical, syntax or spelling errors.





Assessment & Plan


Facial/periorbital cellulitis, appears to be showing initial improvement to IV 

antibiotics.  Pending blood culture results, patient should be continued on 

vancomycin and Zosyn.  Length of IV antibiotics will be determined by clinical 

response.  Will follow.

## 2018-03-18 NOTE — CARDIOLOGY CONSULTATION
DATE OF CONSULTATION:  03/18/2018

 

REFERRING PHYSICIAN:  Dr. Ayaz Trejo.

 

REASON FOR CONSULTATION:  Elevated troponin.

 

CHIEF COMPLAINT ON ADMISSION:  Fall and eye pain.

 

HISTORY OF PRESENT ILLNESS:  Ms. Arnett is a 77-year-old female with a

history of nonobstructive CAD, hypertension, hyperlipidemia, obstructive

sleep apnea, left bundle branch block, presented to the ER after a fall -

03/17/2018.  She was found to have extensive facial cellulitis and started on

intravenous antibiotic therapy.  Incidentally, troponins are noted to be

mildly elevated.  A repeat resting 2D transthoracic echo performed this

morning demonstrates decline in LV function with ejection fraction of 35%. 

The patient reports dyspnea on exertion over the past 6 months.  She notes

the dyspnea is somewhat more prominent since admission.  She has received

intravenous hydration due to rhabdomyolysis, acute renal insufficiency. 

Currently, the patient describes facial pain.  Denies any chest heaviness or

tightness.  No dysrhythmias on telemetry.  Denies orthopnea or PND.  Denies

GI distress, vomiting, diarrhea, or constipation.  No fever or subjective

chills.

 

REVIEW OF SYSTEMS:  The pertinent positive noted per HPI.  Comprehensive

10-system review is otherwise negative.

 

PAST MEDICAL HISTORY:

1. Left bundle branch block.

2. Nonobstructive CAD.

3. Hypertension.

4. Dyslipidemia.

 

PAST SURGICAL HISTORY:  Cataract surgery, cardiac catheterization

demonstrating nonobstructive CAD.

 

FAMILY HISTORY:  Negative for premature CAD or sudden cardiac death.

 

SOCIAL HISTORY:  She is a lifelong nonsmoker.  She , lives with her

family.

 

ALLERGIES:  LATEX, DOXYCYCLINE, MEPERIDINE, PROPOXYPHENE, AND SULFA.

 

CURRENT OUTPATIENT MEDICATIONS:

1. Albuterol 2 puffs q.i.d.

2. Allopurinol 100 mg twice daily.

3. Aspirin 81 mg daily.

4. Atenolol 12.5 mg daily.

5. Lipitor 40 mg daily.

6. Calcium carbonate with vitamin D daily.

7. Clobetasol topically daily.

8. Plendil 5 mg daily.

9. Hydrochlorothiazide 25 mg daily.

10.  Ketaconazole twice daily topically.

11.  Lisinopril 10 mg twice daily.

12.  Magnesium 1 tablet daily.

13.  Multivitamin daily.

14.  Omeprazole daily.

15.  Terazosin 2 mg at bedtime.

16.  Zyrtec.

17.  Lasix 20 mg twice weekly as needed.

18.  Meclizine as needed.

19.  Ocean nasal spray.

20.  Albuterol inhaler as needed.

 

DATA:  ECG on admission is sinus rhythm with left bundle branch block and

frequent premature atrial complexes.

 

IMAGING DATA:  Chest x-ray performed on admission demonstrates bibasilar

interstitial prominence.

 

Head CT:  No acute intracranial process.

 

Soft tissue CT:  Cellulitis of the right perimandibular and submandibular

region.  No evidence for drainable abscess or collection.

 

LABORATORY DATA:  Initial troponin 2.630 and repeat troponin this a.m. 0.910.

 

Initial CPK ____ repeat CPK this a.m. ____

 

Sodium 136, potassium 2.9, chloride 104, CO2 is 24, BUN is 19, and creatinine

0.83.

 

White blood cell count 17.49, hemoglobin is 10.8, and platelet count is 126.

 

Blood gas 7.45/38/43/26/78%, this is a VBG.

 

Urinalysis concentrated, trace leukocyte esterase.

 

PHYSICAL EXAMINATION:

VITAL SIGNS:  Temperature 37 degrees centigrade, pulse 77 beats per minute

and regular, respiratory rate 20 breaths per minute, blood pressure 169/92. 

SaO2 92% on 2 liters.

GENERAL:  NAD, awake and alert.

HEENT:  There is significant erythema and edema periorbitally as well as left

side worse than right.  There is an exudative drainage from her left eye.

NECK:  Supple without JVD or HJR.  No carotid bruit.

HEART:  Regular with a normal S1, S2, no murmur, rub or gallop.

LUNGS:  Demonstrate crackles at the right base.  There is no rhonchi or

wheeze.

ABDOMEN:  Soft, nontender.  No rebound or guarding.

EXTREMITIES:  Warm and dry without clubbing, cyanosis or edema.

NEUROLOGIC:  Demonstrates no focal motor deficit.

 

FINAL IMPRESSION:

1. A 77-year-old female admitted with periorbital and facial cellulitis.

2. Rhabdomyolysis.

3. Acute renal insufficiency secondary to volume depletion -- improved with

rehydration.

4. Acute decompensated systolic heart failure secondary to aggressive

hydration.

5. Decline in left ventricular systolic function with an ejection fraction of

30-35%.  A repeat echocardiogram performed this a.m. in the setting of left

bundle branch block.

6. Elevated troponin -- suspect type 2 event secondary to acute renal

insufficiency, volume depletion, and sepsis.

7. Hypertension.

8. Dyslipidemia.

9. Hypokalemia.

 

PLAN AND RECOMMENDATIONS:  Recommend reducing intravenous fluid rate given

decline in LV systolic function, evidence of crackles on examination.  The

patient will receive a dose of 20 mg IV Lasix x1 now.  We will monitor urine

output and daily weights.  Repeat basic metabolic panel in the a.m.  Also, we

will restart lisinopril, at a reduced dose 10 mg daily.  Atenolol will be

transitioned to Toprol-XL (evidence based heart failure therapy).  With

decline in LV systolic function and elevated troponin, the patient will

require ishemic evaluation in the future.

This was discussed with her.  Her significant cellulitis, rhabdomyolysis, and

renal issues, will require attention prior to proceeding with any further

procedures.  Other medications will be continued as noted above.  I will

continue to follow closely during hospitalization.  Case discussed with

hospitalist service.

 

 

 

EDE

## 2018-03-18 NOTE — PROGRESS NOTE
Subjective


Date of Service:


Mar 18, 2018.


Subjective


Pt evaluation today including:  conversation w/ patient, physical exam, lab 

review, review of studies, review of inpatient medication list


Saw/examined the patient in room 242


Clinically she is stable


the swelling around the L eye has not improved as of yet


mild improvement of redness around the forehead region


swelling around the submandibular region persists





Patient states she's feeling fine, no chest pain/shortness of breath


Tenderness around the eye and face persists.





Problem List


Medical Problems:


(1) NSTEMI (non-ST elevated myocardial infarction)


Status: Acute  





(2) Periorbital cellulitis


Status: Acute  





(3) Sepsis


Status: Acute  











Review of Systems


Constitutional:  No fever, No chills


Eyes:  + problem reported (c/o L eye; cannot open)


Respiratory:  + dyspnea on exertion, No cough, No sputum, No wheezing, No 

shortness of breath, No dyspnea at rest, No hemoptysis


Cardiac:  No chest pain


Abdomen:  No pain, No nausea, No vomiting, No diarrhea


Heme:  No abnormal bleeding/bruising





Medications





Current Inpatient Medications








 Medications


  (Trade)  Dose


 Ordered  Sig/Sirisha


 Route  Start Time


 Stop Time Status Last Admin


Dose Admin


 


 Ioversol


  (Optiray 320)  111 ml  UD  PRN


 IV  3/17/18 10:30


 3/21/18 10:29   


 


 


 Sodium Chloride  1,000 ml @ 


 100 mls/hr  Q10H


 IV  3/17/18 13:45


 4/16/18 13:44  3/17/18 23:18


150 MLS/HR


 


 Piperacillin Sod/


 Tazobactam Sod


 3.375 gm/Dextrose  115 ml @ 


 28.75 mls/


 hr  Q8H


 IV  3/17/18 16:00


 3/27/18 15:59  3/18/18 07:47


28.75 MLS/HR


 


 Miscellaneous


 Information


  (Consult)  1 ea  UD  PRN


 N/A  3/17/18 12:30


 4/16/18 12:29   


 


 


 Vancomycin HCl


 1000 mg/Sodium


 Chloride  270 ml @ 


 125 mls/hr  Q24H


 IV  3/18/18 06:00


 3/28/18 05:59  3/18/18 05:13


125 MLS/HR


 


 Miscellaneous


 Information


  (Consult)  1 ea  UD  PRN


 N/A  3/17/18 12:30


 4/16/18 12:29   


 


 


 Acetaminophen


  (Tylenol Tab)  650 mg  Q4H  PRN


 PO  3/17/18 13:15


 4/16/18 13:14  3/18/18 07:44


650 MG


 


 Al Hydrox/Mg


 Hydrox/Simethicone


  (Maalox Max Susp)  15 ml  Q4H  PRN


 PO  3/17/18 13:15


 4/16/18 13:14   


 


 


 Magnesium


 Hydroxide


  (Milk Of


 Magnesia Susp)  30 ml  Q12H  PRN


 PO  3/17/18 13:15


 4/16/18 13:14   


 


 


 Ondansetron HCl


  (Zofran Inj)  4 mg  Q6H  PRN


 IV  3/17/18 13:15


 4/16/18 13:14   


 


 


 Albuterol


  (Ventolin Hfa


 Inhaler)  2 puffs  QID


 INH  3/17/18 17:00


 4/16/18 16:59  3/18/18 07:48


2 PUFFS


 


 Aspirin


  (Ecotrin Tab)  81 mg  DAILY


 PO  3/18/18 09:00


 4/17/18 08:59  3/18/18 07:44


81 MG


 


 Atenolol


  (Tenormin Tab)  12.5 mg  DAILY


 PO  3/18/18 09:00


 4/17/18 08:59  3/18/18 07:45


12.5 MG


 


 Atorvastatin


 Calcium


  (Lipitor Tab)  40 mg  DAILY


 PO  3/18/18 09:00


 4/17/18 08:59  3/18/18 07:44


40 MG


 


 Cetirizine HCl


  (zyrTEC TAB)  10 mg  DAILY  PRN


 PO  3/17/18 13:45


 4/16/18 13:44  3/18/18 08:03


10 MG


 


 Felodipine


  (Plendil Tabcr)  5 mg  QAM


 PO  3/18/18 09:00


 4/17/18 08:59  3/18/18 07:45


5 MG


 


 Sodium Chloride


  (Ocean Nasal


 Spray)  2 sprays  UD  PRN


 NA  3/17/18 13:45


 4/16/18 13:44   


 


 


 Magnesium Oxide


  (Mag-Ox Tab)  200 mg  DAILY


 PO  3/18/18 09:00


 4/17/18 08:59  3/18/18 07:44


200 MG


 


 Pantoprazole


 Sodium


  (Protonix Tab)  40 mg  QAM


 PO  3/18/18 09:00


 4/17/18 08:59  3/18/18 07:45


40 MG


 


 Albuterol/


 Ipratropium


  (Duoneb)  3 ml  QIDR


 INH  3/17/18 16:00


 4/16/18 15:59  3/17/18 19:23


3 ML


 


 Albuterol/


 Ipratropium


  (Duoneb)  3 ml  Q2H  PRN


 INH  3/17/18 14:30


 4/16/18 14:29   


 


 


 Heparin Sodium/


 Dextrose  500 ml @ 


 15 mls/hr  Q24H


 IV  3/17/18 18:00


 4/16/18 17:59  3/17/18 18:11


14 MLS/HR


 


 Famotidine 20 mg/


 Dextrose  102 ml @ 


 200 mls/hr  Q12H


 IV  3/18/18 08:15


 4/17/18 08:14 UNV  


 


 


 Diphenhydramine


 HCl 50 mg/Syringe  1 ml @ 1


 mls/min  BID


 IV  3/18/18 09:00


 4/17/18 08:59 UNV  


 











Objective


Vital Signs











  Date Time  Temp Pulse Resp B/P (MAP) Pulse Ox O2 Delivery O2 Flow Rate FiO2


 


3/18/18 07:45 37.0 77 20 169/92 (117) 92 Nasal Cannula 2.0 


 


3/18/18 04:00     93 Nasal Cannula 2.0 


 


3/18/18 03:34 36.9 76 16 136/80 (98) 98 Room Air  


 


3/18/18 00:00     93 Nasal Cannula 2.0 


 


3/17/18 23:53 36.6 69 16 112/71 (85) 98   


 


3/17/18 20:00     93 Nasal Cannula 2.0 


 


3/17/18 19:51  92 16  97 Nasal Cannula 2.0 


 


3/17/18 19:26 37.4 80 18 134/70 (91) 96   


 


3/17/18 16:00     93 Nasal Cannula 2.0 


 


3/17/18 16:00 37.9 90 18 160/94 (116) 93 Nasal Cannula 2.0 


 


3/17/18 14:45  81 18 163/84 97   


 


3/17/18 13:44 36.9       


 


3/17/18 13:15     96 Nasal Cannula 2.0 


 


3/17/18 13:02  87 28 170/94 96 Nasal Cannula 2.0 


 


3/17/18 11:34  79 18 148/83 94 Nasal Cannula 2.0 


 


3/17/18 10:22  87 24 115/61 93 Nasal Cannula 2.0 


 


3/17/18 10:03     94 Nasal Cannula 2.0 


 


3/17/18 09:54  93      


 


3/17/18 09:48 37.8 70 12 105/65 85 Room Air  











Physical Exam


General Appearance:  no apparent distress


Eyes:  + pertinent finding (significant periorbital erythema, swelling, edema, 

L eye cannot open, moving R eye and states she can move the L eye)


Respiratory/Chest:  no respiratory distress, no accessory muscle use, + 

decreased breath sounds


Cardiovascular:  regular rate, rhythm, no edema, no murmur


Extremities:  normal range of motion, non-tender, normal inspection, no pedal 

edema, no calf tenderness





Laboratory Results





Last 24 Hours








Test


  3/17/18


10:22 3/17/18


10:28 3/17/18


14:05 3/17/18


14:12


 


White Blood Count 19.51 K/uL    


 


Red Blood Count 4.31 M/uL    


 


Hemoglobin 13.2 g/dL    


 


Hematocrit 37.0 %    


 


Mean Corpuscular Volume 85.8 fL    


 


Mean Corpuscular Hemoglobin 30.6 pg    


 


Mean Corpuscular Hemoglobin


Concent 35.7 g/dl 


  


  


  


 


 


Platelet Count 150 K/uL    


 


Mean Platelet Volume 10.7 fL    


 


Neutrophils (%) (Auto) 92.4 %    


 


Lymphocytes (%) (Auto) 2.4 %    


 


Monocytes (%) (Auto) 4.7 %    


 


Eosinophils (%) (Auto) 0.0 %    


 


Basophils (%) (Auto) 0.1 %    


 


Neutrophils # (Auto) 18.06 K/uL    


 


Lymphocytes # (Auto) 0.46 K/uL    


 


Monocytes # (Auto) 0.91 K/uL    


 


Eosinophils # (Auto) 0.00 K/uL    


 


Basophils # (Auto) 0.01 K/uL    


 


RDW Standard Deviation 44.8 fL    


 


RDW Coefficient of Variation 14.1 %    


 


Immature Granulocyte % (Auto) 0.4 %    


 


Immature Granulocyte # (Auto) 0.07 K/uL    


 


Venous Blood pH 7.45    


 


Venous Blood Partial Pressure


CO2 38 mmHg 


  


  


  


 


 


Venous Blood Partial Pressure


O2 43 mmHg 


  


  


  


 


 


Venous Blood HCO3 26 mmol/L    


 


Venous Blood Oxygen Saturation 78.2 %    


 


Venous Blood Base Excess 1.9 mEq/L    


 


Sodium Level 134 mmol/L    


 


Potassium Level 3.5 mmol/L    


 


Chloride Level 97 mmol/L    


 


Carbon Dioxide Level 24 mmol/L    


 


Anion Gap 13.0 mmol/L    


 


Blood Urea Nitrogen 28 mg/dl    


 


Creatinine 1.55 mg/dl    


 


Est Creatinine Clear Calc


Drug Dose 27.8 ml/min 


  


  


  


 


 


Estimated GFR (


American) 36.8 


  


  


  


 


 


Estimated GFR (Non-


American 31.7 


  


  


  


 


 


BUN/Creatinine Ratio 18.3    


 


Random Glucose 150 mg/dl    


 


Lactic Acid Level 2.7 mmol/L    2.9 mmol/L 


 


Calcium Level 9.4 mg/dl    


 


Total Bilirubin 0.7 mg/dl    


 


Direct Bilirubin 0.3 mg/dl    


 


Aspartate Amino Transf


(AST/SGOT) 49 U/L 


  


  


  


 


 


Alanine Aminotransferase


(ALT/SGPT) 29 U/L 


  


  


  


 


 


Alkaline Phosphatase 120 U/L    


 


Total Creatine Kinase 1296 U/L    


 


Troponin I 2.630 ng/ml    


 


Total Protein 7.3 gm/dl    


 


Albumin 3.2 gm/dl    


 


Lipase 140 U/L    


 


Prothrombin Time  11.2 SECONDS   


 


Prothromb Time International


Ratio 


  1.1 


  


  


 


 


Activated Partial


Thromboplast Time 


  26.4 SECONDS 


  


  


 


 


Partial Thromboplastin Ratio  1.0   


 


Urine Color   YELLOW  


 


Urine Appearance   CLEAR  


 


Urine pH   5.5  


 


Urine Specific Gravity   1.038  


 


Urine Protein   1+  


 


Urine Glucose (UA)   NEG  


 


Urine Ketones   TRACE  


 


Urine Occult Blood   3+  


 


Urine Nitrite   NEG  


 


Urine Bilirubin   NEG  


 


Urine Urobilinogen   NEG  


 


Urine Leukocyte Esterase   TRACE  


 


Urine WBC (Auto)   5-10 /hpf  


 


Urine RBC (Auto)   0-4 /hpf  


 


Urine Hyaline Casts (Auto)   1-5 /lpf  


 


Urine Epithelial Cells (Auto)   >30 /lpf  


 


Urine Bacteria (Auto)   NEG  


 


Test


  3/17/18


19:03 3/18/18


00:17 3/18/18


01:51 3/18/18


06:53


 


Activated Partial


Thromboplast Time 37.7 SECONDS 


  


  41.1 SECONDS 


  50.2 SECONDS 


 


 


Partial Thromboplastin Ratio 1.5   1.6  1.9 


 


Lactic Acid Level 1.7 mmol/L    


 


Troponin I 1.680 ng/ml  1.180 ng/ml   


 


Procalcitonin 40.48 ng/ml    


 


White Blood Count    17.49 K/uL 


 


Red Blood Count    3.68 M/uL 


 


Hemoglobin    10.8 g/dL 


 


Hematocrit    31.5 % 


 


Mean Corpuscular Volume    85.6 fL 


 


Mean Corpuscular Hemoglobin    29.3 pg 


 


Mean Corpuscular Hemoglobin


Concent 


  


  


  34.3 g/dl 


 


 


RDW Standard Deviation    45.4 fL 


 


RDW Coefficient of Variation    14.4 % 


 


Platelet Count    126 K/uL 


 


Mean Platelet Volume    10.8 fL 


 


Sodium Level    136 mmol/L 


 


Potassium Level    2.9 mmol/L 


 


Chloride Level    104 mmol/L 


 


Carbon Dioxide Level    24 mmol/L 


 


Anion Gap    7.0 mmol/L 


 


Blood Urea Nitrogen    19 mg/dl 


 


Creatinine    0.83 mg/dl 


 


Est Creatinine Clear Calc


Drug Dose 


  


  


  51.8 ml/min 


 


 


Estimated GFR (


American) 


  


  


  78.3 


 


 


Estimated GFR (Non-


American 


  


  


  67.5 


 


 


BUN/Creatinine Ratio    22.6 


 


Random Glucose    118 mg/dl 


 


Calcium Level    8.1 mg/dl 


 


Magnesium Level    2.3 mg/dl 











Assessment and Plan


This is a 77 year old female with a PMH of CAD, HTN, HLD, THU on nocturnal CPAP

, hx. of vertigo - presents after a fall this morning, subsequent discovery of 

significant periorbital cellulitis, sepsis, elevated troponin, acute kidney 

injury





Sepsis secondary to Preseptal Cellulitis


3/18


* minimal improvement of the preseptal cellulitis


* lactic acid improving, WBC improving, vitals are stable, afebrile


* will continue IV Zosyn + Vancomycin - consulted ID


* consulted facial surgeon - may need repeat facial CT if no improvement





3/17


* Facial and soft tissue neck CT obtained - showing Mild nonspecific cellulitis 

of the right perimandibular and submandibular region.


* no abscess noted on CT


* patient with significant white count elevated, lactic acidosis, low grade 

fever


* started on IV Zosyn and IV Vanco


* blood cultures pending


* ID consulted for further input


* will recheck lactic acid in 4 hours and adjust fluids; for now, we can do 

125mL/hr of NS





Elevated Troponin


Hx. of ASCVD with medical management


Likely Type 2 MI in the setting of Sepsis


3/18


* troponin trending downwards


* updated echo pending


* likely type 2 MI from sepsis


* will continue IV heparin ggt


* consulted cardiology for further input





3/17


* as per outpatient cardiology notes; patient has been having dyspnea on 

exertion


* was scheduled for a routine stress test prior to an elective knee surgery in 

early 2018, though this was not done


* patient presents today without any chest pain or palpitations - does have 

intermittent shortness of breath


* troponin elevated to >2


* possibly due to the sepsis and increased oxygen demand


* will start low dose heparin ggt


* cardiology consulted for further input; will continue aspirin, statin, b-

blocker


* trend cardiac enzymes


* ordered for an updated echo





Mechanical Fall


Ambulatory Dysfunction at baseline


3/17


* patient uses cane at baseline


* will order PT/OT


* likely fall from dehydration/sepsis/infection as patient did not lose 

consciousness


* Head CT negative; radiographs negative for fractures





Acute Kidney Injury - resolved


3/18


* creatinine <1


* will change fluids to NS + 20meq KCl @ 100mL/hr





3/17


* baseline creatinine at 1.0


* presented with creatinine of 1.5


* prerenal/dehydration - monitor for worsening as IV contrast was used for 

facial/neck CT


* bolus given in the ED, will start NS @ 125mL/hr





HTN


* blood pressure was low on admission


* will hold ACE-I, HCTZ due to kidney injury


* continue b-blocker and monitor





DVT ppx


* IV heparin





FULL CODE

## 2018-03-19 VITALS
TEMPERATURE: 99.14 F | DIASTOLIC BLOOD PRESSURE: 92 MMHG | HEART RATE: 80 BPM | OXYGEN SATURATION: 93 % | SYSTOLIC BLOOD PRESSURE: 129 MMHG

## 2018-03-19 VITALS
SYSTOLIC BLOOD PRESSURE: 136 MMHG | TEMPERATURE: 102.2 F | OXYGEN SATURATION: 96 % | DIASTOLIC BLOOD PRESSURE: 87 MMHG | HEART RATE: 101 BPM

## 2018-03-19 VITALS
OXYGEN SATURATION: 92 % | TEMPERATURE: 98.42 F | SYSTOLIC BLOOD PRESSURE: 119 MMHG | DIASTOLIC BLOOD PRESSURE: 68 MMHG | HEART RATE: 82 BPM

## 2018-03-19 VITALS
OXYGEN SATURATION: 96 % | TEMPERATURE: 100.04 F | DIASTOLIC BLOOD PRESSURE: 85 MMHG | HEART RATE: 85 BPM | SYSTOLIC BLOOD PRESSURE: 151 MMHG

## 2018-03-19 VITALS
HEART RATE: 97 BPM | TEMPERATURE: 98.6 F | OXYGEN SATURATION: 93 % | DIASTOLIC BLOOD PRESSURE: 83 MMHG | SYSTOLIC BLOOD PRESSURE: 160 MMHG

## 2018-03-19 VITALS — TEMPERATURE: 99.68 F

## 2018-03-19 LAB
BUN SERPL-MCNC: 14 MG/DL (ref 7–18)
CALCIUM SERPL-MCNC: 8.9 MG/DL (ref 8.5–10.1)
CO2 SERPL-SCNC: 24 MMOL/L (ref 21–32)
CREAT SERPL-MCNC: 0.86 MG/DL (ref 0.6–1.2)
EOSINOPHIL NFR BLD AUTO: 147 K/UL (ref 130–400)
GLUCOSE SERPL-MCNC: 116 MG/DL (ref 70–99)
HCT VFR BLD CALC: 33.6 % (ref 37–47)
HGB BLD-MCNC: 11.5 G/DL (ref 12–16)
MCH RBC QN AUTO: 29.4 PG (ref 25–34)
MCHC RBC AUTO-ENTMCNC: 34.2 G/DL (ref 32–36)
MCV RBC AUTO: 85.9 FL (ref 80–100)
PMV BLD AUTO: 10.6 FL (ref 7.4–10.4)
POTASSIUM SERPL-SCNC: 3.7 MMOL/L (ref 3.5–5.1)
PTT PATIENT: 31.7 SECONDS (ref 21–31)
RED CELL DISTRIBUTION WIDTH CV: 14.5 % (ref 11.5–14.5)
RED CELL DISTRIBUTION WIDTH SD: 45.8 FL (ref 36.4–46.3)
SODIUM SERPL-SCNC: 134 MMOL/L (ref 136–145)
WBC # BLD AUTO: 16.79 K/UL (ref 4.8–10.8)

## 2018-03-19 RX ADMIN — GENTAMICIN SULFATE SCH APPLN: 3 OINTMENT OPHTHALMIC at 14:24

## 2018-03-19 RX ADMIN — FAMOTIDINE SCH MLS/MIN: 10 INJECTION INTRAVENOUS at 08:29

## 2018-03-19 RX ADMIN — HEPARIN SODIUM SCH UNIT: 10000 INJECTION, SOLUTION INTRAVENOUS; SUBCUTANEOUS at 20:57

## 2018-03-19 RX ADMIN — ALBUTEROL SULFATE SCH PUFFS: 90 AEROSOL, METERED RESPIRATORY (INHALATION) at 16:44

## 2018-03-19 RX ADMIN — PIPERACILLIN AND TAZOBACTAM SCH MLS/HR: 3; .375 INJECTION, POWDER, LYOPHILIZED, FOR SOLUTION INTRAVENOUS; PARENTERAL at 16:44

## 2018-03-19 RX ADMIN — PIPERACILLIN AND TAZOBACTAM SCH MLS/HR: 3; .375 INJECTION, POWDER, LYOPHILIZED, FOR SOLUTION INTRAVENOUS; PARENTERAL at 04:19

## 2018-03-19 RX ADMIN — Medication SCH MG: at 08:23

## 2018-03-19 RX ADMIN — VANCOMYCIN HYDROCHLORIDE SCH MLS/HR: 1 INJECTION, POWDER, LYOPHILIZED, FOR SOLUTION INTRAVENOUS at 16:43

## 2018-03-19 RX ADMIN — FAMOTIDINE SCH MLS/MIN: 10 INJECTION INTRAVENOUS at 20:54

## 2018-03-19 RX ADMIN — ALBUTEROL SULFATE SCH PUFFS: 90 AEROSOL, METERED RESPIRATORY (INHALATION) at 14:29

## 2018-03-19 RX ADMIN — FELODIPINE SCH MG: 5 TABLET, FILM COATED, EXTENDED RELEASE ORAL at 08:24

## 2018-03-19 RX ADMIN — IPRATROPIUM BROMIDE AND ALBUTEROL SULFATE SCH ML: .5; 3 SOLUTION RESPIRATORY (INHALATION) at 20:00

## 2018-03-19 RX ADMIN — IPRATROPIUM BROMIDE AND ALBUTEROL SULFATE SCH ML: .5; 3 SOLUTION RESPIRATORY (INHALATION) at 16:00

## 2018-03-19 RX ADMIN — PANTOPRAZOLE SCH MG: 40 TABLET, DELAYED RELEASE ORAL at 08:24

## 2018-03-19 RX ADMIN — IPRATROPIUM BROMIDE AND ALBUTEROL SULFATE SCH ML: .5; 3 SOLUTION RESPIRATORY (INHALATION) at 07:14

## 2018-03-19 RX ADMIN — ACETAMINOPHEN PRN MG: 325 TABLET ORAL at 11:30

## 2018-03-19 RX ADMIN — CETIRIZINE HYDROCHLORIDE PRN MG: 10 TABLET, FILM COATED ORAL at 08:24

## 2018-03-19 RX ADMIN — ALBUTEROL SULFATE SCH PUFFS: 90 AEROSOL, METERED RESPIRATORY (INHALATION) at 08:25

## 2018-03-19 RX ADMIN — ATORVASTATIN CALCIUM SCH MG: 40 TABLET, FILM COATED ORAL at 08:24

## 2018-03-19 RX ADMIN — IPRATROPIUM BROMIDE AND ALBUTEROL SULFATE SCH ML: .5; 3 SOLUTION RESPIRATORY (INHALATION) at 12:00

## 2018-03-19 RX ADMIN — METOPROLOL SUCCINATE SCH MG: 25 TABLET, EXTENDED RELEASE ORAL at 20:55

## 2018-03-19 RX ADMIN — CETIRIZINE HYDROCHLORIDE PRN MG: 10 TABLET, FILM COATED ORAL at 20:53

## 2018-03-19 RX ADMIN — GENTAMICIN SULFATE SCH APPLN: 3 OINTMENT OPHTHALMIC at 16:45

## 2018-03-19 RX ADMIN — HEPARIN SODIUM SCH UNIT: 10000 INJECTION, SOLUTION INTRAVENOUS; SUBCUTANEOUS at 14:26

## 2018-03-19 RX ADMIN — PIPERACILLIN AND TAZOBACTAM SCH MLS/HR: 3; .375 INJECTION, POWDER, LYOPHILIZED, FOR SOLUTION INTRAVENOUS; PARENTERAL at 08:28

## 2018-03-19 RX ADMIN — ALBUTEROL SULFATE SCH PUFFS: 90 AEROSOL, METERED RESPIRATORY (INHALATION) at 20:52

## 2018-03-19 NOTE — PRE-OPERATIVE CONSULTATION
History


General


Date of Service:


Mar 19, 2018.


Chief Complaint:  Facial cellulitis





 (Jody Chambers PA-C)





HPI


HPI:


The patient is a 77 year old female being seen in consultation for facial 

edema.  She reports feeling like she had a sinus infection which started on 

Thursday.  She does admit to falling on Thursday from feeling weak and fell on 

her bottom.  On Friday, the patient noted a rash that started on her face near 

her left eye and continued to spread. She did develop swelling on her face.  

The patient reports a similar experience in 2005 that resulted from a sinus 

infection. She recently underwent bilateral cataract surgery. She denies eye 

pain or change in vision when her eyes are open. Patient is currently being 

monitored for elevated troponin, systolic heart failure and rhabdomyolysis. She 

is being treated with IV vancomycin and Zosyn. CT of the head and neck revealed 

no abscess.


Historian:  patient


 (Jody Chambers PA-C)


HPI:


Patient reports cataract surgery about 1 month ago without difficulty.  Denies 

eye pain or vision changes.  denies history of shingles, denies trauma to left 

eye.  Not currently using compresses or eye ointment.  States she had purulent 

nasal drainage for several days but this has improved since starting abx here.  

CT of head and neck done.


 (Quiana Caicedo MD)


Risk Assessment


Daily beta blocker use?:  Yes


 (Jody Chambers PA-C)





Problem List


Medical Problems:


(1) NSTEMI (non-ST elevated myocardial infarction)


Status: Acute  





(2) Periorbital cellulitis


Status: Acute  





(3) Sepsis


Status: Acute  





 (Jody Chambers PA-C)





Medical & Surgical History


Past Medical History:


PAST MEDICAL HISTORY:


1. Left bundle branch block.


2. Nonobstructive CAD.


3. Hypertension.


4. Dyslipidemia.


 


PAST SURGICAL HISTORY:  Cataract surgery, cardiac catheterization


demonstrating nonobstructive CAD.


 (Jody Chambers PA-C)





Family History


Family History:  no pertinent family hx


 (Jody Chambers PA-C)





Social History


Hx Tobacco Use In Past Year?:  No


Smoking Status:  Never Smoker


Marital status:  


Occupation status:  retired


 (Jody Chambers PA-C)





Allergies


Allergies:  


Coded Allergies:  


     Latex1 -Allergic Contact Dermititis (Verified  Allergy, Unknown, SLIGHT 

RASH, 3/17/18)


     Doxycycline (Unverified  Adverse Reaction, Unknown, GI UPSET, 3/17/18)


     Meperidine (Unverified  Adverse Reaction, Unknown, GI UPSET, 3/17/18)


     Propoxyphene (Unverified  Adverse Reaction, Unknown, GI UPSET, 3/17/18)


     Sulfa Antibiotics (Unverified  Adverse Reaction, Unknown, GI UPSET, 3/17/18

)





Medications


Current Inpatient Medications





Current Inpatient Medications








 Medications


  (Trade)  Dose


 Ordered  Sig/Sirisha


 Route  Start Time


 Stop Time Status Last Admin


Dose Admin


 


 Ioversol


  (Optiray 320)  111 ml  UD  PRN


 IV  3/17/18 10:30


 3/21/18 10:29   


 


 


 Piperacillin Sod/


 Tazobactam Sod


 3.375 gm/Dextrose  115 ml @ 


 28.75 mls/


 hr  Q8H


 IV  3/17/18 16:00


 3/27/18 15:59  3/19/18 08:28


28.75 MLS/HR


 


 Miscellaneous


 Information


  (Consult)  1 ea  UD  PRN


 N/A  3/17/18 12:30


 4/16/18 12:29   


 


 


 Miscellaneous


 Information


  (Consult)  1 ea  UD  PRN


 N/A  3/17/18 12:30


 4/16/18 12:29   


 


 


 Acetaminophen


  (Tylenol Tab)  650 mg  Q4H  PRN


 PO  3/17/18 13:15


 4/16/18 13:14  3/19/18 11:30


650 MG


 


 Al Hydrox/Mg


 Hydrox/Simethicone


  (Maalox Max Susp)  15 ml  Q4H  PRN


 PO  3/17/18 13:15


 4/16/18 13:14   


 


 


 Magnesium


 Hydroxide


  (Milk Of


 Magnesia Susp)  30 ml  Q12H  PRN


 PO  3/17/18 13:15


 4/16/18 13:14   


 


 


 Ondansetron HCl


  (Zofran Inj)  4 mg  Q6H  PRN


 IV  3/17/18 13:15


 4/16/18 13:14   


 


 


 Albuterol


  (Ventolin Hfa


 Inhaler)  2 puffs  QID


 INH  3/17/18 17:00


 4/16/18 16:59  3/19/18 08:25


2 PUFFS


 


 Aspirin


  (Ecotrin Tab)  81 mg  DAILY


 PO  3/18/18 09:00


 4/17/18 08:59  3/19/18 08:23


81 MG


 


 Atorvastatin


 Calcium


  (Lipitor Tab)  40 mg  DAILY


 PO  3/18/18 09:00


 4/17/18 08:59 Future Hold 3/19/18 08:24


40 MG


 


 Cetirizine HCl


  (zyrTEC TAB)  10 mg  DAILY  PRN


 PO  3/17/18 13:45


 4/16/18 13:44  3/19/18 08:24


10 MG


 


 Felodipine


  (Plendil Tabcr)  5 mg  QAM


 PO  3/18/18 09:00


 4/17/18 08:59  3/19/18 08:24


5 MG


 


 Sodium Chloride


  (Ocean Nasal


 Spray)  2 sprays  UD  PRN


 NA  3/17/18 13:45


 4/16/18 13:44   


 


 


 Magnesium Oxide


  (Mag-Ox Tab)  200 mg  DAILY


 PO  3/18/18 09:00


 4/17/18 08:59  3/19/18 08:23


200 MG


 


 Pantoprazole


 Sodium


  (Protonix Tab)  40 mg  QAM


 PO  3/18/18 09:00


 4/17/18 08:59  3/19/18 08:24


40 MG


 


 Albuterol/


 Ipratropium


  (Duoneb)  3 ml  QIDR


 INH  3/17/18 16:00


 4/16/18 15:59  3/17/18 19:23


3 ML


 


 Famotidine 20 mg/


 Syringe  5 ml @ 2.5


 mls/min  Q12H


 IV  3/18/18 09:00


 4/17/18 08:59  3/19/18 08:29


2.5 MLS/MIN


 


 Vancomycin HCl


 1250 mg/Sodium


 Chloride  275 ml @ 


 125 mls/hr  Q18H


 IV  3/18/18 22:00


 3/28/18 21:59  3/18/18 20:55


125 MLS/HR


 


 Furosemide 20 mg/


 Syringe  2 ml @ 4


 mls/min  DAILY


 IV  3/19/18 09:00


 4/18/18 08:59 Future Hold 3/19/18 08:23


4 MLS/MIN


 


 Lisinopril


  (Zestril Tab)  10 mg  QAM


 PO  3/19/18 09:00


 4/18/18 08:59 Future Hold 3/19/18 08:24


10 MG


 


 Diphenhydramine


 HCl


  (Benadryl Inj)  50 mg  BID


 IV.  3/19/18 21:00


 4/18/18 20:59   


 


 


 Metoprolol


 Succinate


  (Toprol Xl Tab)  25 mg  BID


 PO  3/19/18 21:00


 4/18/18 08:59   


 


 


 Heparin Sodium


  (Porcine)


  (Heparin Sq 5000


 Unit/0.5ml)  5,000 unit  Q8


 SQ  3/19/18 14:00


 4/18/18 13:59   


 








 (Jody Chambers PA-C)





Review of Systems


Review of Systems


Constitutional:  no symptoms reported


Eyes:  reports: other, denies: eye pain, double vision, visual changes, blurred 

vision, photophobia


ENT:  reports: no symptoms reported


Cardiovascular:  reports: no symptoms reported


Respiratory:  reports: no symptoms reported (Jody Chambers PA-C)





Physical Exam


Physical Exam


General Appearance:  + other (significant facial edema present. left upper 

eyelid with edema, purulent discharge and maceration. )


Ears, Nose, Throat:  + other (significant edema of upper lid, erytheam and 

edema of malar region)


Skin Characteristics:  + other (warm compress applied to left upper eyelid and 

purulent discharge debrided with forcep) (Jody Chambers, PA-C)


Physical Exam:


Patient with significant facial edema, erythema, worse on the left, especially 

periorbitally


Purulent discharge from left eye with crusting 


When cleansed patient able to open eye and appears to have vision grossly intact


 (Quiana Caicedo MD)





Diagnostics


Labs


Labs





Results Past 24 Hours








Test


  3/18/18


20:29 3/19/18


07:33 3/19/18


07:38 3/19/18


12:18 Range/Units


 


 


White Blood Count 18.54 16.79   4.8-10.8  K/uL


 


Red Blood Count 4.07 3.91   4.2-5.4  M/uL


 


Hemoglobin 11.8 11.5   12.0-16.0  g/dL


 


Hematocrit 35.0 33.6   37-47  %


 


Mean Corpuscular Volume 86.0 85.9     fL


 


Mean Corpuscular Hemoglobin 29.0 29.4   25-34  pg


 


Mean Corpuscular Hemoglobin


Concent 33.7


  34.2


  


  


  32-36  g/dl


 


 


Platelet Count 151 147   130-400  K/uL


 


Mean Platelet Volume 11.1 10.6   7.4-10.4  fL


 


Neutrophils (%) (Auto) 90.6     %


 


Lymphocytes (%) (Auto) 3.2     %


 


Monocytes (%) (Auto) 5.7     %


 


Eosinophils (%) (Auto) 0.0     %


 


Basophils (%) (Auto) 0.1     %


 


Neutrophils # (Auto) 16.80    1.4-6.5  K/uL


 


Lymphocytes # (Auto) 0.60    1.2-3.4  K/uL


 


Monocytes # (Auto) 1.06    0.11-0.59  K/uL


 


Eosinophils # (Auto) 0.00    0-0.5  K/uL


 


Basophils # (Auto) 0.01    0-0.2  K/uL


 


RDW Standard Deviation 45.2 45.8   36.4-46.3  fL


 


RDW Coefficient of Variation 14.4 14.5   11.5-14.5  %


 


Immature Granulocyte % (Auto) 0.4     %


 


Immature Granulocyte # (Auto) 0.07    0.00-0.02  K/uL


 


Sodium Level 136   134 136-145  mmol/L


 


Potassium Level 3.5   3.7 3.5-5.1  mmol/L


 


Chloride Level 102   99   mmol/L


 


Carbon Dioxide Level 26   24 21-32  mmol/L


 


Anion Gap 8.0   10.0 3-11  mmol/L


 


Blood Urea Nitrogen 19   14 7-18  mg/dl


 


Creatinine


  0.89


  


  


  0.86


  0.60-1.20


mg/dl


 


Est Creatinine Clear Calc


Drug Dose 48.3


  


  


  50.4


   ml/min


 


 


Estimated GFR (


American) 71.9


  


  


  75.0


   


 


 


Estimated GFR (Non-


American 62.1


  


  


  64.7


   


 


 


BUN/Creatinine Ratio 21.1   15.8 10-20  


 


Random Glucose 134   116 70-99  mg/dl


 


Lactic Acid Level 2.2    0.4-2.0  mmol/L


 


Calcium Level 8.4   8.9 8.5-10.1  mg/dl


 


Troponin I 1.110    0-0.045  ng/ml


 


Activated Partial


Thromboplast Time 


  31.7


  


  


  21.0-31.0


SECONDS


 


Partial Thromboplastin Ratio  1.2    


 


Total Creatine Kinase   81231    U/L








Microbiology Results


3/18/18 Blood Culture, Received


          Pending


3/18/18 Blood Culture, Received


          Pending


 (Jody Chambers ., VERN)


Labs











Test


  3/17/18


10:22 3/17/18


10:28 3/17/18


14:05 3/17/18


19:03


 


Immature Granulocyte % (Auto) 0.4    


 


White Blood Count 19.51    


 


Red Blood Count 4.31    


 


Hemoglobin 13.2    


 


Hematocrit 37.0    


 


Mean Corpuscular Volume 85.8    


 


Mean Corpuscular Hemoglobin 30.6    


 


Mean Corpuscular Hemoglobin


Concent 35.7 


  


  


  


 


 


Platelet Count 150    


 


Mean Platelet Volume 10.7    


 


Neutrophils (%) (Auto) 92.4    


 


Lymphocytes (%) (Auto) 2.4    


 


Monocytes (%) (Auto) 4.7    


 


Eosinophils (%) (Auto) 0.0    


 


Basophils (%) (Auto) 0.1    


 


Neutrophils # (Auto) 18.06    


 


Lymphocytes # (Auto) 0.46    


 


Monocytes # (Auto) 0.91    


 


Eosinophils # (Auto) 0.00    


 


Basophils # (Auto) 0.01    


 


Immature Granulocyte # (Auto) 0.07    


 


Venous Blood pH 7.45    


 


Venous Blood Partial Pressure


CO2 38 


  


  


  


 


 


Venous Blood Partial Pressure


O2 43 


  


  


  


 


 


Venous Blood HCO3 26    


 


Venous Blood Oxygen Saturation 78.2    


 


Venous Blood Base Excess 1.9    


 


Total Bilirubin 0.7    


 


Direct Bilirubin 0.3    


 


Aspartate Amino Transferase


(AST) 49 


  


  


  


 


 


Alanine Aminotransferase (ALT) 29    


 


Alkaline Phosphatase 120    


 


Total Protein 7.3    


 


Albumin 3.2    


 


Lipase 140    


 


Prothrombin Time  11.2   


 


Prothrombin Time INR  1.1   


 


Urine Color   YELLOW  


 


Urine Appearance   CLEAR  


 


Urine pH   5.5  


 


Urine Specific Gravity   1.038  


 


Urine Protein   1+  


 


Urine Glucose (UA)   NEG  


 


Urine Ketones   TRACE  


 


Urine Occult Blood   3+  


 


Urine Nitrite   NEG  


 


Urine Bilirubin   NEG  


 


Urine Urobilinogen   NEG  


 


Urine Leukocyte Esterase   TRACE  


 


Urine WBC (Auto)   5-10  


 


Urine RBC (Auto)   0-4  


 


Urine Hyaline Casts (Auto)   1-5  


 


Urine Epithelial Cells (Auto)   >30  


 


Urine Bacteria (Auto)   NEG  


 


Procalcitonin    40.48 


 


Test


  3/18/18


06:53 3/18/18


20:29 3/19/18


07:33 3/19/18


07:38


 


PTT 50.2   31.7  


 


Partial Thromboplastin Ratio 1.9   1.2  


 


Magnesium Level 2.3    


 


White Blood Count  18.54  16.79  


 


Red Blood Count  4.07  3.91  


 


Hemoglobin  11.8  11.5  


 


Hematocrit  35.0  33.6  


 


Mean Corpuscular Volume  86.0  85.9  


 


Mean Corpuscular Hemoglobin  29.0  29.4  


 


Mean Corpuscular Hemoglobin


Concent 


  33.7 


  34.2 


  


 


 


Platelet Count  151  147  


 


Mean Platelet Volume  11.1  10.6  


 


Neutrophils (%) (Auto)  90.6   


 


Lymphocytes (%) (Auto)  3.2   


 


Monocytes (%) (Auto)  5.7   


 


Eosinophils (%) (Auto)  0.0   


 


Basophils (%) (Auto)  0.1   


 


Neutrophils # (Auto)  16.80   


 


Lymphocytes # (Auto)  0.60   


 


Monocytes # (Auto)  1.06   


 


Eosinophils # (Auto)  0.00   


 


Basophils # (Auto)  0.01   


 


RDW Standard Deviation  45.2  45.8  


 


RDW Coefficient of Variation  14.4  14.5  


 


Immature Granulocyte % (Auto)  0.4   


 


Immature Granulocyte # (Auto)  0.07   


 


Sodium Level  136   


 


Potassium Level  3.5   


 


Chloride Level  102   


 


Carbon Dioxide Level  26   


 


Anion Gap  8.0   


 


Blood Urea Nitrogen  19   


 


Creatinine  0.89   


 


Est Creatinine Clear Calc


Drug Dose 


  48.3 


  


  


 


 


Estimated GFR (


American) 


  71.9 


  


  


 


 


Estimated GFR (Non-


American 


  62.1 


  


  


 


 


BUN/Creatinine Ratio  21.1   


 


Random Glucose  134   


 


Lactic Acid Level  2.2   


 


Calcium Level  8.4   


 


Troponin I  1.110   


 


Total Creatine Kinase    21239 


 


Test


  3/19/18


12:18 


  


  


 


 


Sodium Level 134    


 


Potassium Level 3.7    


 


Chloride Level 99    


 


Carbon Dioxide Level 24    


 


Anion Gap 10.0    


 


Blood Urea Nitrogen 14    


 


Creatinine 0.86    


 


Est Creatinine Clear Calc


Drug Dose 50.4 


  


  


  


 


 


Estimated GFR (


American) 75.0 


  


  


  


 


 


Estimated GFR (Non-


American 64.7 


  


  


  


 


 


BUN/Creatinine Ratio 15.8    


 


Random Glucose 116    


 


Lactic Acid Level 2.3    


 


Calcium Level 8.9    


 


Total Creatine Kinase 55479    


 


Troponin I 0.923    








 (Quiana Caicedo MD)


Lab Interpretation


Lab Interpretation:  labs were reviewed


 (Jody Chambers PA-C)


Lab Interpretation:  labs were reviewed


 (Quiana Caicedo MD)


Diagnostic Radiology


Diagnostic Radiology


Neck CT


IMPRESSION:  


1. Mild nonspecific cellulitis of the right perimandibular and submandibular


region.


2. No evidence for drainable abscess or collection. 





Head CT


Impression:


No acute intracranial abnormality. Chronic and age-related change.


 (Jody Chambers PA-C)


Diagnostic Radiology


CT scans reviewed-mild cellulitis, no abscesses noted


 (Quiana Caicedo MD)





Impression


Assessment and Plan


Assessment and Plan


Facial Edema


1. Will start with treating left eye edema with warm compress and gentamicin 

ophthalmic. Will re-evaluate patient tomorrow and may consider CT of her sinus 

and maxillofacial bones. 





Patient was seen and examined by Dr. Caicedo and she has made the above 

recommendations. 


 (Jody Chambers ., PA-C)


Assessment and Plan


Periorbital cellulitis/conjhunctivitis





Would recommend warm compresses and gentle cleansing as well as gentamycin eye 

ointment 3-4x/day.  If no improvement over the next 24 hours, would consider a 

CT sinuses/maxillofacial bones to determine whether an abscess has formed or 

whether there is significant sinusitis.  No surgical intervention required at 

this point.  Will follow.


 (Quiana Caicedo MD)

## 2018-03-19 NOTE — CARDIOLOGY FOLLOW-UP
Subjective


General


Date of Service:


Mar 19, 2018.


Chief Complaint:  Facial cellulitis


Pt evaluation today including:  conversation w/ patient, physical exam, chart 

review, lab review, review of studies, review of inpatient medication list





History of Present Illness


Dyspneic when ambulating to and from the restroom. + Facial pain. Denies chest 

pain, palpitations, orthopnea, PND, or peripheral edema. 





EKG this morning reveals snus tachycardia at 101 bpm with left axis deviation, 

left bundle branch block. When compared with ECG of 18-MAR-2018 06:30, T wave 

inversion more evident in Lateral leads.





Telemetry: Sinus/sinus tachycardia with a left bundle branch block pattern. 

Occasional to frequent PAC's. No atrial fibrillation. 





March 17, 2018 TTE Interpretation Summary ([facility], Dr. Gandara): Left 

ventricular systolic function is moderately reduced. Ejection Fraction = 30-35%

. Septal motion is consistent with conduction abnormality. Otherwise moderate 

diffuse hypokinesis. The left atrium is moderately dilated. Aortic valve 

sclerosis mild, without significant aortic valvular stenosis. There is moderate 

to severe mitral annular calcification. There is mild mitral regurgitation.





Allergies


Coded Allergies:  


     Latex1 -Allergic Contact Dermititis (Verified  Allergy, Unknown, SLIGHT 

RASH, 3/17/18)


     Doxycycline (Unverified  Adverse Reaction, Unknown, GI UPSET, 3/17/18)


     Meperidine (Unverified  Adverse Reaction, Unknown, GI UPSET, 3/17/18)


     Propoxyphene (Unverified  Adverse Reaction, Unknown, GI UPSET, 3/17/18)


     Sulfa Antibiotics (Unverified  Adverse Reaction, Unknown, GI UPSET, 3/17/18

)





Social History


Smoking Status:  Never Smoker


Hx Tobacco Use In Past Year?:  No


Hx Alcohol Use - Type And Amou:  Yes (1 GLASS WINE/DAY)


Hx Substance Use - Type And Am:  No





Problem List


Medical Problems:


(1) NSTEMI (non-ST elevated myocardial infarction)


Status: Acute  





(2) Periorbital cellulitis


Status: Acute  





(3) Sepsis


Status: Acute  











Review of Systems


Respiratory:  + dyspnea on exertion, No cough, No sputum, No wheezing, No 

shortness of breath, No dyspnea at rest, No hemoptysis


Cardiac:  No chest pain, No orthopnea, No PND, No edema, No claudication





Physical Exam


Vital Signs





Last Vital Signs Documentation








  Date Time  Temp Pulse Resp B/P (MAP) Pulse Ox O2 Delivery O2 Flow Rate FiO2


 


3/19/18 08:00      Nasal Cannula 2.0 


 


3/19/18 07:58 37.0 97 20 160/83 (108) 93   











Physical Exam


Constitutional:  


   Level of Distress:  acutely ill


Psychiatric:  


   Mental Status:  active & alert


   Orientation:  to time, to place, to person


   Memory:  recent memory normal, remote memory normal


ENMT:  pertinent finding (significant facial erythema and edema)


Neck:  pertinent finding (Normal JVP)


Lungs:  


   Auscultation:  no wheezing, no rales/crackles, no rhonchi, decreased breath 

sounds


Cardiovascular:  


   Heart Auscultation:  no murmurs, tachycardia, irregular rate rhythm


Peripheral Pulses:  


   Dorsalis Pedis Pulse:  normal on the left, normal on the right


Abdomen:  


   Bowel Sounds:  normal


   Inspection & Palpation:  soft, non-distended, no masses


Extremities:  no edema (in the lower extremities), no clubbing


Neurologic:  


   Cranial Nerves:  grossly intact





Assessment and Plan


Assessment and Plan


Admission following a mechanical fall with significant periorbital and facial 

cellulitis, rhabdomyolysis, acute renal insufficiency (improved)


Elevated troponin, asymptomatic in regards to angina symptoms. Suspect 

rhabdomyolysis, infection, acute renal dysfunction


Echocardiography with reduction in left ventricular systolic function, EF 30-35%

.  


Chronic left bundle branch block. 


Compensated systolic congestive heart failure signs and symptoms. 


Hypertension.


Dyslipidemia.


Borderline hypokalemia.


 


RECOMMENDATIONS:  


Discontinue IV heparin. 


Hold lisinopril, ? Angioedema


Hold atorvastatin, RE: rhabdomyolysis


Increase Toprol XL to 25 mg twice a day, hopefully prevention new onset atrial 

fibrillation


Hold furosemide


Supplement potassium orally today.


Subcutaneous heparin for DVT prophylaxis


Eventual ischemic evaluation, Lexiscan versus diagnostic cardiac 

catheterization down the road. 





Cardiology Attending Physician:





Patient seen and examined at the bedside.  Facial erythema, edema unchanged.  

CPK trending upward.  Renal function remains stable.  Denies chest discomfort.  

Previously reported dyspnea on exertion has improved.  Offers no other 

complaints at this time.





PE: VSS. GEN: NAD. AAOx3.  Heart: Regular, normal S1-S2.  No murmur.  Lungs: 

Clear bilateral, no rales, rhonchi, wheeze.  Extremities: No lower extremity 

edema.





A/P: Agree with above PA-C history, physical exam, assessment and plan.  Hold 

ACE inhibitor and statin therapy currently.  Repeat BMP and CPK in a.m.  

Ischemic evaluation in the future when acute infectious process has resolved.





Solo Gandara DO, formerly [facility]





Laboratory Results





Last 24 Hours








Test


  3/18/18


12:53 3/18/18


20:29 3/19/18


07:33 3/19/18


07:38


 


Troponin I 0.863 ng/ml  1.110 ng/ml   


 


White Blood Count  18.54 K/uL  16.79 K/uL  


 


Red Blood Count  4.07 M/uL  3.91 M/uL  


 


Hemoglobin  11.8 g/dL  11.5 g/dL  


 


Hematocrit  35.0 %  33.6 %  


 


Mean Corpuscular Volume  86.0 fL  85.9 fL  


 


Mean Corpuscular Hemoglobin  29.0 pg  29.4 pg  


 


Mean Corpuscular Hemoglobin


Concent 


  33.7 g/dl 


  34.2 g/dl 


  


 


 


Platelet Count  151 K/uL  147 K/uL  


 


Mean Platelet Volume  11.1 fL  10.6 fL  


 


Neutrophils (%) (Auto)  90.6 %   


 


Lymphocytes (%) (Auto)  3.2 %   


 


Monocytes (%) (Auto)  5.7 %   


 


Eosinophils (%) (Auto)  0.0 %   


 


Basophils (%) (Auto)  0.1 %   


 


Neutrophils # (Auto)  16.80 K/uL   


 


Lymphocytes # (Auto)  0.60 K/uL   


 


Monocytes # (Auto)  1.06 K/uL   


 


Eosinophils # (Auto)  0.00 K/uL   


 


Basophils # (Auto)  0.01 K/uL   


 


RDW Standard Deviation  45.2 fL  45.8 fL  


 


RDW Coefficient of Variation  14.4 %  14.5 %  


 


Immature Granulocyte % (Auto)  0.4 %   


 


Immature Granulocyte # (Auto)  0.07 K/uL   


 


Sodium Level  136 mmol/L   


 


Potassium Level  3.5 mmol/L   


 


Chloride Level  102 mmol/L   


 


Carbon Dioxide Level  26 mmol/L   


 


Anion Gap  8.0 mmol/L   


 


Blood Urea Nitrogen  19 mg/dl   


 


Creatinine  0.89 mg/dl   


 


Est Creatinine Clear Calc


Drug Dose 


  48.3 ml/min 


  


  


 


 


Estimated GFR (


American) 


  71.9 


  


  


 


 


Estimated GFR (Non-


American 


  62.1 


  


  


 


 


BUN/Creatinine Ratio  21.1   


 


Random Glucose  134 mg/dl   


 


Lactic Acid Level  2.2 mmol/L   


 


Calcium Level  8.4 mg/dl   


 


Activated Partial


Thromboplast Time 


  


  31.7 SECONDS 


  


 


 


Partial Thromboplastin Ratio   1.2  


 


Total Creatine Kinase    06063 U/L

## 2018-03-19 NOTE — PHARMACY PROGRESS NOTE
Pharmacy Abx Dose Short Note


Date of Service


Mar 19, 2018.





Assessment & Plan


Assessment








77 year old female receiving Vancomycin and zosyn for treatment of facial 

cellulitis.


Day # 3 of antimicrobial therapy.











Plan








Vancomycin


* Trough level of 12.5 mcg/mL is subtherapeutic, however it does not reflect 

steady state.  


 * Would expect vanc level to rise with subsequent doses.


* Continue dose of 1250 mg IV every 18 hours  


* Goal trough level for facial cellulitis: 15 to 20 mcg/mL


* Trough level ordered for: 3/21 @2133





Pharmacy will continue to follow and will adjust dose/frequency as necessary.  

Thank you.

## 2018-03-19 NOTE — PROGRESS NOTE
Subjective


Date of Service:


Mar 19, 2018.


Subjective


Pt evaluation today including:  conversation w/ patient, physical exam, lab 

review, review of studies, review of inpatient medication list


Saw/examined the patient in room 242


Patient is doing better today than last evening


no longer hallucinating today, with minimal confusion


mild improvement of the L eye


cellulitis throughout the face


no chest pain/shortness of breath





Problem List


Medical Problems:


(1) NSTEMI (non-ST elevated myocardial infarction)


Status: Acute  





(2) Periorbital cellulitis


Status: Acute  





(3) Sepsis


Status: Acute  











Review of Systems


Constitutional:  No fever, No chills


Respiratory:  No cough, No sputum, No shortness of breath


Cardiac:  No chest pain


Abdomen:  No pain, No nausea, No vomiting, No diarrhea


Skin:  + see HPI, + rash





Medications





Current Inpatient Medications








 Medications


  (Trade)  Dose


 Ordered  Sig/Sirisha


 Route  Start Time


 Stop Time Status Last Admin


Dose Admin


 


 Ioversol


  (Optiray 320)  111 ml  UD  PRN


 IV  3/17/18 10:30


 3/21/18 10:29   


 


 


 Piperacillin Sod/


 Tazobactam Sod


 3.375 gm/Dextrose  115 ml @ 


 28.75 mls/


 hr  Q8H


 IV  3/17/18 16:00


 3/27/18 15:59  3/19/18 08:28


28.75 MLS/HR


 


 Miscellaneous


 Information


  (Consult)  1 ea  UD  PRN


 N/A  3/17/18 12:30


 4/16/18 12:29   


 


 


 Miscellaneous


 Information


  (Consult)  1 ea  UD  PRN


 N/A  3/17/18 12:30


 4/16/18 12:29   


 


 


 Acetaminophen


  (Tylenol Tab)  650 mg  Q4H  PRN


 PO  3/17/18 13:15


 4/16/18 13:14  3/19/18 11:30


650 MG


 


 Al Hydrox/Mg


 Hydrox/Simethicone


  (Maalox Max Susp)  15 ml  Q4H  PRN


 PO  3/17/18 13:15


 4/16/18 13:14   


 


 


 Magnesium


 Hydroxide


  (Milk Of


 Magnesia Susp)  30 ml  Q12H  PRN


 PO  3/17/18 13:15


 4/16/18 13:14   


 


 


 Ondansetron HCl


  (Zofran Inj)  4 mg  Q6H  PRN


 IV  3/17/18 13:15


 4/16/18 13:14   


 


 


 Albuterol


  (Ventolin Hfa


 Inhaler)  2 puffs  QID


 INH  3/17/18 17:00


 4/16/18 16:59  3/19/18 08:25


2 PUFFS


 


 Aspirin


  (Ecotrin Tab)  81 mg  DAILY


 PO  3/18/18 09:00


 4/17/18 08:59  3/19/18 08:23


81 MG


 


 Atorvastatin


 Calcium


  (Lipitor Tab)  40 mg  DAILY


 PO  3/18/18 09:00


 4/17/18 08:59 Future Hold 3/19/18 08:24


40 MG


 


 Cetirizine HCl


  (zyrTEC TAB)  10 mg  DAILY  PRN


 PO  3/17/18 13:45


 4/16/18 13:44  3/19/18 08:24


10 MG


 


 Felodipine


  (Plendil Tabcr)  5 mg  QAM


 PO  3/18/18 09:00


 4/17/18 08:59  3/19/18 08:24


5 MG


 


 Sodium Chloride


  (Ocean Nasal


 Spray)  2 sprays  UD  PRN


 NA  3/17/18 13:45


 4/16/18 13:44   


 


 


 Magnesium Oxide


  (Mag-Ox Tab)  200 mg  DAILY


 PO  3/18/18 09:00


 4/17/18 08:59  3/19/18 08:23


200 MG


 


 Pantoprazole


 Sodium


  (Protonix Tab)  40 mg  QAM


 PO  3/18/18 09:00


 4/17/18 08:59  3/19/18 08:24


40 MG


 


 Albuterol/


 Ipratropium


  (Duoneb)  3 ml  QIDR


 INH  3/17/18 16:00


 4/16/18 15:59  3/17/18 19:23


3 ML


 


 Famotidine 20 mg/


 Syringe  5 ml @ 2.5


 mls/min  Q12H


 IV  3/18/18 09:00


 4/17/18 08:59  3/19/18 08:29


2.5 MLS/MIN


 


 Vancomycin HCl


 1250 mg/Sodium


 Chloride  275 ml @ 


 125 mls/hr  Q18H


 IV  3/18/18 22:00


 3/28/18 21:59  3/18/18 20:55


125 MLS/HR


 


 Furosemide 20 mg/


 Syringe  2 ml @ 4


 mls/min  DAILY


 IV  3/19/18 09:00


 4/18/18 08:59 Future Hold 3/19/18 08:23


4 MLS/MIN


 


 Lisinopril


  (Zestril Tab)  10 mg  QAM


 PO  3/19/18 09:00


 4/18/18 08:59 Future Hold 3/19/18 08:24


10 MG


 


 Diphenhydramine


 HCl


  (Benadryl Inj)  50 mg  BID


 IV.  3/19/18 21:00


 4/18/18 20:59   


 


 


 Metoprolol


 Succinate


  (Toprol Xl Tab)  25 mg  BID


 PO  3/19/18 21:00


 4/18/18 08:59   


 


 


 Heparin Sodium


  (Porcine)


  (Heparin Sq 5000


 Unit/0.5ml)  5,000 unit  Q8


 SQ  3/19/18 14:00


 4/18/18 13:59  3/19/18 14:26


5,000 UNIT


 


 Gentamicin Sulfate


  (Gentamicin 0.3%


 Oph Oint)  1 appln  4XDQ3H


 OP  3/19/18 13:00


 3/29/18 12:59  3/19/18 14:24


1 APPLN











Objective


Vital Signs











  Date Time  Temp Pulse Resp B/P (MAP) Pulse Ox O2 Delivery O2 Flow Rate FiO2


 


3/19/18 15:16 36.9 82 18 119/68 (85) 92 Nasal Cannula 2.0 


 


3/19/18 12:00      Nasal Cannula 2.0 


 


3/19/18 11:13 39.0 101 20 136/87 (103) 96 Nasal Cannula 2.0 


 


3/19/18 08:00      Nasal Cannula 2.0 


 


3/19/18 07:58 37.0 97 20 160/83 (108) 93 Room Air  


 


3/19/18 04:00      Nasal Cannula 2.0 


 


3/19/18 04:00 37.8 85 18 151/85 (107) 96  2.0 


 


3/19/18 00:18 37.6       


 


3/19/18 00:00      Nasal Cannula 2.0 


 


3/18/18 23:25 37.2 87 16 134/88 (103) 99  2.0 


 


3/18/18 21:38 37.9       


 


3/18/18 21:36 37.9       


 


3/18/18 20:00      Nasal Cannula 2.0 


 


3/18/18 19:21 38.5 108 20 174/97 (122) 94 Nasal Cannula 2.0 


 


3/18/18 16:00      Nasal Cannula 2.0 











Physical Exam


General Appearance:  no apparent distress


ENT:  + pertinent finding (significant facial cellulitis, L eye closed, 

periorbital swelling, erythema)


Respiratory/Chest:  lungs clear, normal breath sounds, no respiratory distress, 

no accessory muscle use


Cardiovascular:  regular rate, rhythm, no edema, no murmur


Neurologic/Psychiatric:  no motor/sensory deficits, alert, normal mood/affect





Laboratory Results





Last 24 Hours








Test


  3/18/18


20:29 3/19/18


07:33 3/19/18


07:38 3/19/18


12:18


 


White Blood Count 18.54 K/uL  16.79 K/uL   


 


Red Blood Count 4.07 M/uL  3.91 M/uL   


 


Hemoglobin 11.8 g/dL  11.5 g/dL   


 


Hematocrit 35.0 %  33.6 %   


 


Mean Corpuscular Volume 86.0 fL  85.9 fL   


 


Mean Corpuscular Hemoglobin 29.0 pg  29.4 pg   


 


Mean Corpuscular Hemoglobin


Concent 33.7 g/dl 


  34.2 g/dl 


  


  


 


 


Platelet Count 151 K/uL  147 K/uL   


 


Mean Platelet Volume 11.1 fL  10.6 fL   


 


Neutrophils (%) (Auto) 90.6 %    


 


Lymphocytes (%) (Auto) 3.2 %    


 


Monocytes (%) (Auto) 5.7 %    


 


Eosinophils (%) (Auto) 0.0 %    


 


Basophils (%) (Auto) 0.1 %    


 


Neutrophils # (Auto) 16.80 K/uL    


 


Lymphocytes # (Auto) 0.60 K/uL    


 


Monocytes # (Auto) 1.06 K/uL    


 


Eosinophils # (Auto) 0.00 K/uL    


 


Basophils # (Auto) 0.01 K/uL    


 


RDW Standard Deviation 45.2 fL  45.8 fL   


 


RDW Coefficient of Variation 14.4 %  14.5 %   


 


Immature Granulocyte % (Auto) 0.4 %    


 


Immature Granulocyte # (Auto) 0.07 K/uL    


 


Sodium Level 136 mmol/L    134 mmol/L 


 


Potassium Level 3.5 mmol/L    3.7 mmol/L 


 


Chloride Level 102 mmol/L    99 mmol/L 


 


Carbon Dioxide Level 26 mmol/L    24 mmol/L 


 


Anion Gap 8.0 mmol/L    10.0 mmol/L 


 


Blood Urea Nitrogen 19 mg/dl    14 mg/dl 


 


Creatinine 0.89 mg/dl    0.86 mg/dl 


 


Est Creatinine Clear Calc


Drug Dose 48.3 ml/min 


  


  


  50.4 ml/min 


 


 


Estimated GFR (


American) 71.9 


  


  


  75.0 


 


 


Estimated GFR (Non-


American 62.1 


  


  


  64.7 


 


 


BUN/Creatinine Ratio 21.1    15.8 


 


Random Glucose 134 mg/dl    116 mg/dl 


 


Lactic Acid Level 2.2 mmol/L    2.3 mmol/L 


 


Calcium Level 8.4 mg/dl    8.9 mg/dl 


 


Troponin I 1.110 ng/ml    0.923 ng/ml 


 


Activated Partial


Thromboplast Time 


  31.7 SECONDS 


  


  


 


 


Partial Thromboplastin Ratio  1.2   


 


Total Creatine Kinase   15279 U/L  40829 U/L 


 


Test


  3/19/18


15:32 


  


  


 











Assessment and Plan


This is a 77 year old female with a PMH of CAD, HTN, HLD, THU on nocturnal CPAP

, hx. of vertigo - presents after a fall this morning, subsequent discovery of 

significant periorbital cellulitis, sepsis, elevated troponin, acute kidney 

injury





Sepsis secondary to Preseptal Cellulitis


3/19


* there continues to be a very slow improvement of the preseptal cellulitis


* continue IV Vanco and Zosyn, ID and oromaxillary facial input appreciated


* gentamicin ophthalmic ointment ordered - warm compresses





3/18


* minimal improvement of the preseptal cellulitis


* lactic acid improving, WBC improving, vitals are stable, afebrile


* will continue IV Zosyn + Vancomycin - consulted ID


* consulted facial surgeon - may need repeat facial CT if no improvement





3/17


* Facial and soft tissue neck CT obtained - showing Mild nonspecific cellulitis 

of the right perimandibular and submandibular region.


* no abscess noted on CT


* patient with significant white count elevated, lactic acidosis, low grade 

fever


* started on IV Zosyn and IV Vanco


* blood cultures pending


* ID consulted for further input


* will recheck lactic acid in 4 hours and adjust fluids; for now, we can do 

125mL/hr of NS





Elevated Troponin


Hx. of ASCVD with medical management


Likely Type 2 MI in the setting of Sepsis


3/19


* stopped IV heparin


* continue aspirin, statin, b-blocker





3/18


* troponin trending downwards


* updated echo pending


* likely type 2 MI from sepsis


* will continue IV heparin ggt


* consulted cardiology for further input





3/17


* as per outpatient cardiology notes; patient has been having dyspnea on 

exertion


* was scheduled for a routine stress test prior to an elective knee surgery in 

early 2018, though this was not done


* patient presents today without any chest pain or palpitations - does have 

intermittent shortness of breath


* troponin elevated to >2


* possibly due to the sepsis and increased oxygen demand


* will start low dose heparin ggt


* cardiology consulted for further input; will continue aspirin, statin, b-

blocker


* trend cardiac enzymes


* ordered for an updated echo





Reduced LVEF


Systolic CHF


* LVEF reduction, unknown cause, possibly ischemic cardiomyopathy


* should have further w/up - cardiac cath afterwards


* currently started on ACE-I and intermittent Lasix, monitor for overload





Mechanical Fall


Ambulatory Dysfunction at baseline


3/17


* patient uses cane at baseline


* will order PT/OT


* likely fall from dehydration/sepsis/infection as patient did not lose 

consciousness


* Head CT negative; radiographs negative for fractures





Acute Kidney Injury - resolved


3/18


* creatinine <1


* will change fluids to NS + 20meq KCl @ 100mL/hr





3/17


* baseline creatinine at 1.0


* presented with creatinine of 1.5


* prerenal/dehydration - monitor for worsening as IV contrast was used for 

facial/neck CT


* bolus given in the ED, will start NS @ 125mL/hr





HTN


* blood pressure was low on admission


* will hold ACE-I, HCTZ due to kidney injury


* continue b-blocker and monitor





DVT ppx


* IV heparin





FULL CODE

## 2018-03-20 VITALS
SYSTOLIC BLOOD PRESSURE: 147 MMHG | HEART RATE: 76 BPM | TEMPERATURE: 98.42 F | OXYGEN SATURATION: 95 % | DIASTOLIC BLOOD PRESSURE: 80 MMHG

## 2018-03-20 VITALS
SYSTOLIC BLOOD PRESSURE: 150 MMHG | OXYGEN SATURATION: 92 % | DIASTOLIC BLOOD PRESSURE: 86 MMHG | TEMPERATURE: 98.78 F | HEART RATE: 71 BPM

## 2018-03-20 VITALS
DIASTOLIC BLOOD PRESSURE: 86 MMHG | HEART RATE: 75 BPM | SYSTOLIC BLOOD PRESSURE: 148 MMHG | TEMPERATURE: 99.14 F | OXYGEN SATURATION: 95 %

## 2018-03-20 VITALS
DIASTOLIC BLOOD PRESSURE: 83 MMHG | OXYGEN SATURATION: 95 % | TEMPERATURE: 98.78 F | HEART RATE: 69 BPM | SYSTOLIC BLOOD PRESSURE: 135 MMHG

## 2018-03-20 VITALS
TEMPERATURE: 98.06 F | SYSTOLIC BLOOD PRESSURE: 147 MMHG | DIASTOLIC BLOOD PRESSURE: 79 MMHG | OXYGEN SATURATION: 94 % | HEART RATE: 68 BPM

## 2018-03-20 VITALS
HEART RATE: 77 BPM | DIASTOLIC BLOOD PRESSURE: 83 MMHG | OXYGEN SATURATION: 92 % | SYSTOLIC BLOOD PRESSURE: 133 MMHG | TEMPERATURE: 98.06 F

## 2018-03-20 VITALS
HEART RATE: 78 BPM | TEMPERATURE: 98.6 F | OXYGEN SATURATION: 94 % | DIASTOLIC BLOOD PRESSURE: 83 MMHG | SYSTOLIC BLOOD PRESSURE: 159 MMHG

## 2018-03-20 LAB
ALBUMIN SERPL-MCNC: 2.4 GM/DL (ref 3.4–5)
ALP SERPL-CCNC: 133 U/L (ref 45–117)
ALT SERPL-CCNC: 172 U/L (ref 12–78)
AST SERPL-CCNC: 572 U/L (ref 15–37)
BASOPHILS # BLD: 0.01 K/UL (ref 0–0.2)
BASOPHILS NFR BLD: 0.1 %
BUN SERPL-MCNC: 13 MG/DL (ref 7–18)
CALCIUM SERPL-MCNC: 8.4 MG/DL (ref 8.5–10.1)
CO2 SERPL-SCNC: 26 MMOL/L (ref 21–32)
CREAT SERPL-MCNC: 0.8 MG/DL (ref 0.6–1.2)
EOS ABS #: 0.01 K/UL (ref 0–0.5)
EOSINOPHIL NFR BLD AUTO: 151 K/UL (ref 130–400)
GLUCOSE SERPL-MCNC: 108 MG/DL (ref 70–99)
HCT VFR BLD CALC: 32.7 % (ref 37–47)
HGB BLD-MCNC: 11.5 G/DL (ref 12–16)
IG#: 0.06 K/UL (ref 0–0.02)
IMM GRANULOCYTES NFR BLD AUTO: 8 %
LYMPHOCYTES # BLD: 1.34 K/UL (ref 1.2–3.4)
MCH RBC QN AUTO: 30.2 PG (ref 25–34)
MCHC RBC AUTO-ENTMCNC: 35.2 G/DL (ref 32–36)
MCV RBC AUTO: 85.8 FL (ref 80–100)
MONO ABS #: 0.96 K/UL (ref 0.11–0.59)
MONOCYTES NFR BLD: 5.7 %
NEUT ABS #: 14.36 K/UL (ref 1.4–6.5)
NEUTROPHILS # BLD AUTO: 0.1 %
NEUTROPHILS NFR BLD AUTO: 85.7 %
PMV BLD AUTO: 10.3 FL (ref 7.4–10.4)
POTASSIUM SERPL-SCNC: 3.4 MMOL/L (ref 3.5–5.1)
PROT SERPL-MCNC: 6.6 GM/DL (ref 6.4–8.2)
PTT PATIENT: 31 SECONDS (ref 21–31)
RED CELL DISTRIBUTION WIDTH CV: 14.3 % (ref 11.5–14.5)
RED CELL DISTRIBUTION WIDTH SD: 45.2 FL (ref 36.4–46.3)
SODIUM SERPL-SCNC: 135 MMOL/L (ref 136–145)
WBC # BLD AUTO: 16.74 K/UL (ref 4.8–10.8)

## 2018-03-20 RX ADMIN — PANTOPRAZOLE SCH MG: 40 TABLET, DELAYED RELEASE ORAL at 09:29

## 2018-03-20 RX ADMIN — PIPERACILLIN AND TAZOBACTAM SCH MLS/HR: 3; .375 INJECTION, POWDER, LYOPHILIZED, FOR SOLUTION INTRAVENOUS; PARENTERAL at 23:31

## 2018-03-20 RX ADMIN — GENTAMICIN SULFATE SCH APPLN: 3 OINTMENT OPHTHALMIC at 10:32

## 2018-03-20 RX ADMIN — FAMOTIDINE SCH MLS/MIN: 10 INJECTION INTRAVENOUS at 20:08

## 2018-03-20 RX ADMIN — PIPERACILLIN AND TAZOBACTAM SCH MLS/HR: 3; .375 INJECTION, POWDER, LYOPHILIZED, FOR SOLUTION INTRAVENOUS; PARENTERAL at 15:32

## 2018-03-20 RX ADMIN — METOPROLOL SUCCINATE SCH MG: 25 TABLET, EXTENDED RELEASE ORAL at 20:08

## 2018-03-20 RX ADMIN — PIPERACILLIN AND TAZOBACTAM SCH MLS/HR: 3; .375 INJECTION, POWDER, LYOPHILIZED, FOR SOLUTION INTRAVENOUS; PARENTERAL at 00:25

## 2018-03-20 RX ADMIN — HEPARIN SODIUM SCH UNIT: 10000 INJECTION, SOLUTION INTRAVENOUS; SUBCUTANEOUS at 05:51

## 2018-03-20 RX ADMIN — ALBUTEROL SULFATE SCH PUFFS: 90 AEROSOL, METERED RESPIRATORY (INHALATION) at 20:08

## 2018-03-20 RX ADMIN — PIPERACILLIN AND TAZOBACTAM SCH MLS/HR: 3; .375 INJECTION, POWDER, LYOPHILIZED, FOR SOLUTION INTRAVENOUS; PARENTERAL at 08:19

## 2018-03-20 RX ADMIN — METOPROLOL SUCCINATE SCH MG: 25 TABLET, EXTENDED RELEASE ORAL at 08:19

## 2018-03-20 RX ADMIN — ALBUTEROL SULFATE SCH PUFFS: 90 AEROSOL, METERED RESPIRATORY (INHALATION) at 08:21

## 2018-03-20 RX ADMIN — Medication SCH MG: at 08:20

## 2018-03-20 RX ADMIN — ALBUTEROL SULFATE SCH PUFFS: 90 AEROSOL, METERED RESPIRATORY (INHALATION) at 13:47

## 2018-03-20 RX ADMIN — ALBUTEROL SULFATE SCH PUFFS: 90 AEROSOL, METERED RESPIRATORY (INHALATION) at 16:44

## 2018-03-20 RX ADMIN — GENTAMICIN SULFATE SCH APPLN: 3 OINTMENT OPHTHALMIC at 08:21

## 2018-03-20 RX ADMIN — VANCOMYCIN HYDROCHLORIDE SCH MLS/HR: 1 INJECTION, POWDER, LYOPHILIZED, FOR SOLUTION INTRAVENOUS at 10:31

## 2018-03-20 RX ADMIN — FELODIPINE SCH MG: 5 TABLET, FILM COATED, EXTENDED RELEASE ORAL at 08:20

## 2018-03-20 RX ADMIN — FAMOTIDINE SCH MLS/MIN: 10 INJECTION INTRAVENOUS at 08:19

## 2018-03-20 RX ADMIN — HEPARIN SODIUM SCH UNIT: 10000 INJECTION, SOLUTION INTRAVENOUS; SUBCUTANEOUS at 20:12

## 2018-03-20 RX ADMIN — HEPARIN SODIUM SCH UNIT: 10000 INJECTION, SOLUTION INTRAVENOUS; SUBCUTANEOUS at 13:49

## 2018-03-20 RX ADMIN — GENTAMICIN SULFATE SCH APPLN: 3 OINTMENT OPHTHALMIC at 13:47

## 2018-03-20 RX ADMIN — GENTAMICIN SULFATE SCH APPLN: 3 OINTMENT OPHTHALMIC at 15:32

## 2018-03-20 NOTE — INFECTIOUS DISEASE PROGRESS NT
Progress Note


Date of Service


Mar 20, 2018.





Subjective


Pt evaluation today including:  conversation w/ patient, conversation w/ family

, physical exam, chart review, lab review, review of studies, conversation w/ 

consultant, review of inpatient medication list


Feeling slightly better today.  Facial pain and swelling better, erythema 

decreasing.  No fever.  Tolerating antibiotic without apparent difficulty 

cultures remain negative to date.





   All Other Systems:  Reviewed and Negative





Medications





Current Inpatient Medications








 Medications


  (Trade)  Dose


 Ordered  Sig/Sirisha


 Route  Start Time


 Stop Time Status Last Admin


Dose Admin


 


 Ioversol


  (Optiray 320)  111 ml  UD  PRN


 IV  3/17/18 10:30


 3/21/18 10:29   


 


 


 Piperacillin Sod/


 Tazobactam Sod


 3.375 gm/Dextrose  115 ml @ 


 28.75 mls/


 hr  Q8H


 IV  3/17/18 16:00


 3/27/18 15:59  3/20/18 08:19


28.75 MLS/HR


 


 Miscellaneous


 Information


  (Consult)  1 ea  UD  PRN


 N/A  3/17/18 12:30


 18 12:29   


 


 


 Miscellaneous


 Information


  (Consult)  1 ea  UD  PRN


 N/A  3/17/18 12:30


 18 12:29   


 


 


 Al Hydrox/Mg


 Hydrox/Simethicone


  (Maalox Max Susp)  15 ml  Q4H  PRN


 PO  3/17/18 13:15


 18 13:14   


 


 


 Magnesium


 Hydroxide


  (Milk Of


 Magnesia Susp)  30 ml  Q12H  PRN


 PO  3/17/18 13:15


 18 13:14   


 


 


 Ondansetron HCl


  (Zofran Inj)  4 mg  Q6H  PRN


 IV  3/17/18 13:15


 18 13:14   


 


 


 Albuterol


  (Ventolin Hfa


 Inhaler)  2 puffs  QID


 INH  3/17/18 17:00


 18 16:59  3/20/18 13:47


2 PUFFS


 


 Aspirin


  (Ecotrin Tab)  81 mg  DAILY


 PO  3/18/18 09:00


 18 08:59  3/20/18 08:20


81 MG


 


 Atorvastatin


 Calcium


  (Lipitor Tab)  40 mg  DAILY


 PO  3/18/18 09:00


 18 08:59 Future Hold 3/19/18 08:24


40 MG


 


 Cetirizine HCl


  (zyrTEC TAB)  10 mg  DAILY  PRN


 PO  3/17/18 13:45


 18 13:44  3/19/18 20:53


10 MG


 


 Felodipine


  (Plendil Tabcr)  5 mg  QAM


 PO  3/18/18 09:00


 18 08:59  3/20/18 08:20


5 MG


 


 Sodium Chloride


  (Ocean Nasal


 Spray)  2 sprays  UD  PRN


 NA  3/17/18 13:45


 18 13:44   


 


 


 Magnesium Oxide


  (Mag-Ox Tab)  200 mg  DAILY


 PO  3/18/18 09:00


 18 08:59  3/20/18 08:20


200 MG


 


 Pantoprazole


 Sodium


  (Protonix Tab)  40 mg  QAM


 PO  3/18/18 09:00


 18 08:59  3/20/18 09:29


40 MG


 


 Albuterol/


 Ipratropium


  (Duoneb)  3 ml  QIDR


 INH  3/17/18 16:00


 18 15:59  3/17/18 19:23


3 ML


 


 Famotidine 20 mg/


 Syringe  5 ml @ 2.5


 mls/min  Q12H


 IV  3/18/18 09:00


 18 08:59  3/20/18 08:19


2.5 MLS/MIN


 


 Vancomycin HCl


 1250 mg/Sodium


 Chloride  275 ml @ 


 125 mls/hr  Q18H


 IV  3/18/18 22:00


 3/28/18 21:59  3/20/18 10:31


125 MLS/HR


 


 Furosemide 20 mg/


 Syringe  2 ml @ 4


 mls/min  DAILY


 IV  3/19/18 09:00


 18 08:59 Future Hold 3/19/18 08:23


4 MLS/MIN


 


 Lisinopril


  (Zestril Tab)  10 mg  QAM


 PO  3/19/18 09:00


 18 08:59 Future Hold 3/19/18 08:24


10 MG


 


 Diphenhydramine


 HCl


  (Benadryl Inj)  50 mg  BID


 IV.  3/19/18 21:00


 18 20:59 Future Hold 3/19/18 20:53


50 MG


 


 Metoprolol


 Succinate


  (Toprol Xl Tab)  25 mg  BID


 PO  3/19/18 21:00


 18 08:59  3/20/18 08:19


25 MG


 


 Heparin Sodium


  (Porcine)


  (Heparin Sq 5000


 Unit/0.5ml)  5,000 unit  Q8


 SQ  3/19/18 14:00


 18 13:59  3/20/18 13:49


5,000 UNIT


 


 Gentamicin Sulfate


  (Gentamicin 0.3%


 Oph Oint)  1 appln  4XDQ3H


 OP  3/19/18 13:00


 3/29/18 12:59  3/20/18 13:47


1 APPLN


 


 Potassium


 Chloride/Sodium


 Chloride  1,000 ml @ 


 60 mls/hr  [id number] ONCE


 IV  3/20/18 05:00


 3/20/18 21:39  3/20/18 05:28


60 MLS/HR


 


 Acetaminophen


  (Tylenol Tab)  325 mg  Q6H  PRN


 PO  3/20/18 05:00


 18 13:14   


 











Objective


Vital Signs











  Date Time  Temp Pulse Resp B/P (MAP) Pulse Ox O2 Delivery O2 Flow Rate FiO2


 


3/20/18 12:27 36.7 77 20 133/83 (100) 92 Room Air  


 


3/20/18 12:00      Room Air  


 


3/20/18 08:20      Room Air  


 


3/20/18 08:09 37.3 75 22 148/86 (106) 95 Room Air  


 


3/20/18 04:12 37.1 71 18 150/86 (107) 92   


 


3/20/18 04:00      Nasal Cannula 2.0 


 


3/20/18 00:12 37.1 69 16 135/83 (100) 95   


 


3/20/18 00:00      Nasal Cannula 2.0 


 


3/19/18 20:00      Nasal Cannula 2.0 


 


3/19/18 19:03 37.3 80 16 129/92 (104) 93 Nasal Cannula 2.0 


 


3/19/18 16:00      Nasal Cannula 2.0 


 


3/19/18 15:16 36.9 82 18 119/68 (85) 92 Nasal Cannula 2.0 











Physical Exam


General Appearance:  WD/WN, no apparent distress


Eyes:  EOMI, sclerae normal, + pertinent finding (Purulent drainage left eye)


ENT:  hearing grossly normal, pharynx normal, + pertinent finding (Facial 

erythema and swelling)


Neck:  supple, no adenopathy, thyroid normal, trachea midline


Respiratory/Chest:  chest non-tender, lungs clear, normal breath sounds, no 

respiratory distress


Cardiovascular:  regular rate, rhythm, no gallop, no murmur


Abdomen:  normal bowel sounds, non tender, soft, no organomegaly


Extremities:  non-tender, no pedal edema, no calf tenderness


Neurologic/Psychiatric:  alert, oriented x 3


Skin:  normal color, no rash, + pertinent finding (Facial erythema improving)


Lymphatic:  no adenopathy





Laboratory Results


------------------------------------





RUN DATE: 18                [facility]             

    PAGE 1   


RUN TIME: 721                            Specimen Inquiry                    


--------------------------------------------------------------------------------

------------





PATIENT: EDUARDO OVERTON               ACCT #: [id number] LOC:  [facility]       U #

: [id number]


                                       AGE/SX: 77/F         ROOM: [facility]       REG

: 18


REG DR:  Ayaz Trejo DO         :    1940   BED:  2          DIS

:         


                                       STATUS: ADM IN       TLOC:           


--------------------------------------------------------------------------------

------------








SPEC #: [id number]        SHAHAB:      STATUS:  RES            REQ 

#: [id number]


                            RECD:      SUBM DR: Ayaz Trejo DO         


SOURCE: BLOOD               ENTR: 18-     OT DR: Rey Jensen MD    

            


hospitalsC:                                                      Jose Argueta D.D.S. Kopinski, Thomas O.

, Cata Dominguez M.D.


ORDERED:  BLOOD CULTURE                                                        

   


--------------------------------------------------------------------------------

------------





  Procedure                         Result                         Verified    

       Site


--------------------------------------------------------------------------------

------------





  BLD CULT  Preliminary                                            


        NO GROWTH TO DATE.                                     





--------------------------------------------------------------------------------

------------






































Last 24 Hours








Test


  3/19/18


15:32 3/20/18


01:49


 


Vancomycin Level Trough 12.5 mcg/ml  


 


White Blood Count  16.74 K/uL 


 


Red Blood Count  3.81 M/uL 


 


Hemoglobin  11.5 g/dL 


 


Hematocrit  32.7 % 


 


Mean Corpuscular Volume  85.8 fL 


 


Mean Corpuscular Hemoglobin  30.2 pg 


 


Mean Corpuscular Hemoglobin


Concent 


  35.2 g/dl 


 


 


Platelet Count  151 K/uL 


 


Mean Platelet Volume  10.3 fL 


 


Neutrophils (%) (Auto)  85.7 % 


 


Lymphocytes (%) (Auto)  8.0 % 


 


Monocytes (%) (Auto)  5.7 % 


 


Eosinophils (%) (Auto)  0.1 % 


 


Basophils (%) (Auto)  0.1 % 


 


Neutrophils # (Auto)  14.36 K/uL 


 


Lymphocytes # (Auto)  1.34 K/uL 


 


Monocytes # (Auto)  0.96 K/uL 


 


Eosinophils # (Auto)  0.01 K/uL 


 


Basophils # (Auto)  0.01 K/uL 


 


RDW Standard Deviation  45.2 fL 


 


RDW Coefficient of Variation  14.3 % 


 


Immature Granulocyte % (Auto)  0.4 % 


 


Immature Granulocyte # (Auto)  0.06 K/uL 


 


Activated Partial


Thromboplast Time 


  31.0 SECONDS 


 


 


Partial Thromboplastin Ratio  1.2 


 


Sodium Level  135 mmol/L 


 


Potassium Level  3.4 mmol/L 


 


Chloride Level  101 mmol/L 


 


Carbon Dioxide Level  26 mmol/L 


 


Anion Gap  8.0 mmol/L 


 


Blood Urea Nitrogen  13 mg/dl 


 


Creatinine  0.80 mg/dl 


 


Est Creatinine Clear Calc


Drug Dose 


  54.2 ml/min 


 


 


Estimated GFR (


American) 


  81.8 


 


 


Estimated GFR (Non-


American 


  70.6 


 


 


BUN/Creatinine Ratio  15.7 


 


Random Glucose  108 mg/dl 


 


Lactic Acid Level  0.9 mmol/L 


 


Calcium Level  8.4 mg/dl 


 


Magnesium Level  1.9 mg/dl 


 


Total Bilirubin  0.8 mg/dl 


 


Aspartate Amino Transf


(AST/SGOT) 


  572 U/L 


 


 


Alanine Aminotransferase


(ALT/SGPT) 


  172 U/L 


 


 


Alkaline Phosphatase  133 U/L 


 


Total Creatine Kinase  50900 U/L 


 


Total Protein  6.6 gm/dl 


 


Albumin  2.4 gm/dl 


 


Globulin  4.2 gm/dl 


 


Albumin/Globulin Ratio  0.6 








                                                                               

                                                                 


Patient Name: EDUARDO OVERTON                               Unit Number:  

[id number]                  


 








 











Dictated: 18


 


Transcribed: 18


 


PBS


 


Printed Date/Time: [~ rep prt dt]/[~ rep prt tm]








 [~ rep ct labl] - [~ rep ct ivnm]


 





   [facility]


 Radiology Department


 [place] [address] [phone]





 











Dictated: 18


 


Transcribed: 18


 


PBS


 


Printed Date/Time: [~ rep prt dt]/[~ rep prt tm]








 [~ rep ct labl] - [~ rep ct ivnm]


 








CHEST ONE VIEW PORTABLE





CLINICAL HISTORY: 77 years-old Female presenting with low  o2. 





TECHNIQUE: Portable upright AP view of the chest was obtained.





COMPARISON: 3/17/2018.





FINDINGS:


The patient is YOHAN rotated. Atherosclerosis of aortic arch. Cardiac silhouette


mildly enlarged. Mild prominence of pulmonary vasculature. No focal lung


opacity. No large effusion or pneumothorax. Right calcified pleural plaques


suggested. Degenerative changes of the thoracic spine. Upper abdomen normal.





IMPRESSION:


1.  Mild cardiomegaly with possible volume overload. No other evidence of acute


cardiopulmonary disease.





2.  Calcified pleural plaques could suggest a history of asbestos exposure.











Electronically signed by:  Clark Diaz M.D.


3/20/2018 7:08 AM





Dictated Date/Time:  3/20/2018 7:07 AM





 The status of this report is Signed.   


 Draft = Not yet reviewed or approved by Radiologist.  


 Signed = Reviewed and approved by Radiologist.


<AttendingPhy>Ayaz Trejo DO</AttendingPhy> <FamilyPhy></FamilyPhy> <

PrimaryPhy>Cata Lozada M.D.</PrimaryPhy> <UnitNumber>[id number]</

UnitNumber> <VisitNumber>[id number]</VisitNumber> <PatientName>EDUARDO OVERTON SYEDA

</PatientName> <DateOfBirth>1940</DateOfBirth> <Location>C.2T</Location> <

ServiceDate>18</ServiceDate> <MNE>ESINDI</MNE> <OrderingPhy>Palomo Solares M.D.</OrderingPhy> <OrderingPhyMNE>f rep ord dr garcia</OrderingPhyMNE> <

DictatingPhyMNE>f rep dict dr garcia</DictatingPhyMNE> <CCListMNE>f rep ct radha</

CCListMNE> <AdmittingPhyMNE>f pt admit dr garcia</AdmittingPhyMNE> <AttendingPhyMNE

>f pt attend dr garcia</AttendingPhyMNE>


<ConsultingPhyMNE>f pt consult dr garcia</ConsultingPhyMNE> <FamilyPhyMNE>f pt fam 

dr garcia</FamilyPhyMNE> <OtherPhyMNE>f pt other dr garcia</OtherPhyMNE> <

PrimaryPhyMNE>f pt prim care dr garcia</PrimaryPhyMNE> <ReferringPhyMNE>f pt 

referring dr garcia</ReferringPhyMNE>





Assessment and Plan


Facial/periorbital cellulitis, appears to be showi improvement to IV 

antibiotics. Patient should be continued on vancomycin and Zosyn.  Length of IV 

antibiotics will be determined by clinical response.  Will follow.

## 2018-03-20 NOTE — PROGRESS NOTE
Internal Med Progress Note


Date of Service:


Mar 20, 2018.


Provider Documentation:





Made aware by RN of increasing patient disorientation.





Zyprexa 1 dose now.





Hold RTC Benadryl.


Gentle IVF for traumatic rhabdomyolysis in light of patient's cardiomyopathy.


Vital Signs:











  Date Time  Temp Pulse Resp B/P (MAP) Pulse Ox O2 Delivery O2 Flow Rate FiO2


 


3/20/18 16:00      Room Air  


 


3/20/18 15:42 36.9 76 20 147/80 (102) 95 Room Air  


 


3/20/18 12:27 36.7 77 20 133/83 (100) 92 Room Air  


 


3/20/18 12:00      Room Air  


 


3/20/18 08:20      Room Air  


 


3/20/18 08:09 37.3 75 22 148/86 (106) 95 Room Air  


 


3/20/18 04:12 37.1 71 18 150/86 (107) 92   


 


3/20/18 04:00      Nasal Cannula 2.0 


 


3/20/18 00:12 37.1 69 16 135/83 (100) 95   


 


3/20/18 00:00      Nasal Cannula 2.0 


 


3/19/18 20:00      Nasal Cannula 2.0 


 


3/19/18 19:03 37.3 80 16 129/92 (104) 93 Nasal Cannula 2.0 








Lab Results:





Results Past 24 Hours








Test


  3/20/18


01:49 Range/Units


 


 


White Blood Count 16.74 4.8-10.8  K/uL


 


Red Blood Count 3.81 4.2-5.4  M/uL


 


Hemoglobin 11.5 12.0-16.0  g/dL


 


Hematocrit 32.7 37-47  %


 


Mean Corpuscular Volume 85.8   fL


 


Mean Corpuscular Hemoglobin 30.2 25-34  pg


 


Mean Corpuscular Hemoglobin


Concent 35.2


  32-36  g/dl


 


 


Platelet Count 151 130-400  K/uL


 


Mean Platelet Volume 10.3 7.4-10.4  fL


 


Neutrophils (%) (Auto) 85.7  %


 


Lymphocytes (%) (Auto) 8.0  %


 


Monocytes (%) (Auto) 5.7  %


 


Eosinophils (%) (Auto) 0.1  %


 


Basophils (%) (Auto) 0.1  %


 


Neutrophils # (Auto) 14.36 1.4-6.5  K/uL


 


Lymphocytes # (Auto) 1.34 1.2-3.4  K/uL


 


Monocytes # (Auto) 0.96 0.11-0.59  K/uL


 


Eosinophils # (Auto) 0.01 0-0.5  K/uL


 


Basophils # (Auto) 0.01 0-0.2  K/uL


 


RDW Standard Deviation 45.2 36.4-46.3  fL


 


RDW Coefficient of Variation 14.3 11.5-14.5  %


 


Immature Granulocyte % (Auto) 0.4  %


 


Immature Granulocyte # (Auto) 0.06 0.00-0.02  K/uL


 


Activated Partial


Thromboplast Time 31.0


  21.0-31.0


SECONDS


 


Partial Thromboplastin Ratio 1.2  


 


Sodium Level 135 136-145  mmol/L


 


Potassium Level 3.4 3.5-5.1  mmol/L


 


Chloride Level 101   mmol/L


 


Carbon Dioxide Level 26 21-32  mmol/L


 


Anion Gap 8.0 3-11  mmol/L


 


Blood Urea Nitrogen 13 7-18  mg/dl


 


Creatinine


  0.80


  0.60-1.20


mg/dl


 


Est Creatinine Clear Calc


Drug Dose 54.2


   ml/min


 


 


Estimated GFR (


American) 81.8


   


 


 


Estimated GFR (Non-


American 70.6


   


 


 


BUN/Creatinine Ratio 15.7 10-20  


 


Random Glucose 108 70-99  mg/dl


 


Lactic Acid Level 0.9 0.4-2.0  mmol/L


 


Calcium Level 8.4 8.5-10.1  mg/dl


 


Magnesium Level 1.9 1.8-2.4  mg/dl


 


Total Bilirubin 0.8 0.2-1  mg/dl


 


Aspartate Amino Transf


(AST/SGOT) 572


  15-37  U/L


 


 


Alanine Aminotransferase


(ALT/SGPT) 172


  12-78  U/L


 


 


Alkaline Phosphatase 133   U/L


 


Total Creatine Kinase 75530   U/L


 


Total Protein 6.6 6.4-8.2  gm/dl


 


Albumin 2.4 3.4-5.0  gm/dl


 


Globulin 4.2 2.5-4.0  gm/dl


 


Albumin/Globulin Ratio 0.6 0.9-2

## 2018-03-20 NOTE — PROGRESS NOTE
Medicine Progress Note


Date & Time of Visit:


Mar 20, 2018 at 16:53.


Subjective


Patient doing better overall, complains of her eyes feeling itchy and swollen 

but otherwise denies any complaints. Has ongoing SOB with exertion. Denies any 

CP or palpitations. Tolerating PO. No fever/chills.





Objective





Last 8 Hrs








  Date Time  Temp Pulse Resp B/P (MAP) Pulse Ox O2 Delivery O2 Flow Rate FiO2


 


3/20/18 16:00      Room Air  


 


3/20/18 15:42 36.9 76 20 147/80 (102) 95 Room Air  


 


3/20/18 12:27 36.7 77 20 133/83 (100) 92 Room Air  


 


3/20/18 12:00      Room Air  








Physical Exam:


GENERAL: Patient is in no acute distress.


HEENT: No acute trauma, normocephalic, mucous membranes moist, no nasal 

congestion, no scleral icterus. B/L periorbital erythema and edema, L>R, + left 

eye drainage/crusting


NECK: No stridor, trachea is midline.


LUNGS: Clear to auscultation bilaterally, no wheeze, no rhonchi, breath sounds 

equal.


HEART: Without murmurs gallops or rubs, regular rate and rhythm.


ABDOMEN: Soft, nontender, bowel sounds positive


EXTREMITIES: No cyanosis or edema


NEUROLOGIC: Oriented x 3, no acute motor or sensory deficits, no focal weakness.


SKIN: No rash, no jaundice, no diaphoresis.


Laboratory Results:





Last 24 Hours








Test


  3/20/18


01:49


 


White Blood Count 16.74 K/uL 


 


Red Blood Count 3.81 M/uL 


 


Hemoglobin 11.5 g/dL 


 


Hematocrit 32.7 % 


 


Mean Corpuscular Volume 85.8 fL 


 


Mean Corpuscular Hemoglobin 30.2 pg 


 


Mean Corpuscular Hemoglobin


Concent 35.2 g/dl 


 


 


Platelet Count 151 K/uL 


 


Mean Platelet Volume 10.3 fL 


 


Neutrophils (%) (Auto) 85.7 % 


 


Lymphocytes (%) (Auto) 8.0 % 


 


Monocytes (%) (Auto) 5.7 % 


 


Eosinophils (%) (Auto) 0.1 % 


 


Basophils (%) (Auto) 0.1 % 


 


Neutrophils # (Auto) 14.36 K/uL 


 


Lymphocytes # (Auto) 1.34 K/uL 


 


Monocytes # (Auto) 0.96 K/uL 


 


Eosinophils # (Auto) 0.01 K/uL 


 


Basophils # (Auto) 0.01 K/uL 


 


RDW Standard Deviation 45.2 fL 


 


RDW Coefficient of Variation 14.3 % 


 


Immature Granulocyte % (Auto) 0.4 % 


 


Immature Granulocyte # (Auto) 0.06 K/uL 


 


Activated Partial


Thromboplast Time 31.0 SECONDS 


 


 


Partial Thromboplastin Ratio 1.2 


 


Sodium Level 135 mmol/L 


 


Potassium Level 3.4 mmol/L 


 


Chloride Level 101 mmol/L 


 


Carbon Dioxide Level 26 mmol/L 


 


Anion Gap 8.0 mmol/L 


 


Blood Urea Nitrogen 13 mg/dl 


 


Creatinine 0.80 mg/dl 


 


Est Creatinine Clear Calc


Drug Dose 54.2 ml/min 


 


 


Estimated GFR (


American) 81.8 


 


 


Estimated GFR (Non-


American 70.6 


 


 


BUN/Creatinine Ratio 15.7 


 


Random Glucose 108 mg/dl 


 


Lactic Acid Level 0.9 mmol/L 


 


Calcium Level 8.4 mg/dl 


 


Magnesium Level 1.9 mg/dl 


 


Total Bilirubin 0.8 mg/dl 


 


Aspartate Amino Transf


(AST/SGOT) 572 U/L 


 


 


Alanine Aminotransferase


(ALT/SGPT) 172 U/L 


 


 


Alkaline Phosphatase 133 U/L 


 


Total Creatine Kinase 90691 U/L 


 


Total Protein 6.6 gm/dl 


 


Albumin 2.4 gm/dl 


 


Globulin 4.2 gm/dl 


 


Albumin/Globulin Ratio 0.6 











Assessment & Plan


PRESEPTAL CELLULITIS: sepsis resolved


-having slow improvement of the preseptal cellulitis


-continue IV Vanco and Zosyn


-ID consulted, appreciate recs


-Oromaxillary facial surgery consulted, appreciate recs; gentamicin ophthalmic 

ointment ordered - warm compresses


-CT Facial and soft tissue neck: showing Mild nonspecific cellulitis of the 

right perimandibular and submandibular region.


-no abscess noted on CT


-on presentation patient had significant leukocytosis, lactic acidosis, low 

grade fever


-blood cultures negative





ELEVATED TROPONIN: probable Type 2 event in the setting of Sepsis 


-known hx of CAD with medical management


-IV heparin stopped


-continue aspirin, statin, b-blocker


-Cardiology consulted, appreciate recs


-TTE: Report:


* -- Conclusions --


* Left ventricular systolic function is moderately reduced.


* Ejection Fraction = 30-35%.


* Septal motion is consistent with conduction abnormality.


* Otherwise moderate diffuse hypokinesis.


* The left atrium is moderately dilated.


* Aortic valve sclerosis mild, without significant aortic valvular stenosis.


* There is moderate to severe mitral annular calcification.


* There is mild mitral regurgitation.


-per outpatient cardiology notes; patient has been having dyspnea on exertion


-no recent stress test 


-no symptoms of chest pain or palpitations but does have intermittent shortness 

of breath


-troponin elevated to >2 then trended down


-continue aspirin, statin, b-blocker





CHRONIC SYSTOLIC CHF: 


-LVEF reduction, per prior TTE; unknown cause, possibly ischemic cardiomyopathy


-will likely have further w/up - cardiac cath afterwards


-recently started on ACE-I and intermittent Lasix, monitor for overload





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


-uses a cane at baseline


-PT/OT consulted


-did not lose consciousness; fall possibly related to hypovolemia


CT Head negative; additional radiographs negative for fractures





FOUZIA: resolved


-baseline Cr <1


-max was 1.55





HTN:


-bp was low on admission


-held ACE-I, HCTZ due to kidney injury


-continue b-blocker and monitor


Current Inpatient Medications:





Current Inpatient Medications








 Medications


  (Trade)  Dose


 Ordered  Sig/Sirisha


 Route  Start Time


 Stop Time Status Last Admin


Dose Admin


 


 Ioversol


  (Optiray 320)  111 ml  UD  PRN


 IV  3/17/18 10:30


 3/21/18 10:29   


 


 


 Piperacillin Sod/


 Tazobactam Sod


 3.375 gm/Dextrose  115 ml @ 


 28.75 mls/


 hr  Q8H


 IV  3/17/18 16:00


 3/27/18 15:59  3/20/18 15:32


28.75 MLS/HR


 


 Miscellaneous


 Information


  (Consult)  1 ea  UD  PRN


 N/A  3/17/18 12:30


 4/16/18 12:29   


 


 


 Miscellaneous


 Information


  (Consult)  1 ea  UD  PRN


 N/A  3/17/18 12:30


 4/16/18 12:29   


 


 


 Al Hydrox/Mg


 Hydrox/Simethicone


  (Maalox Max Susp)  15 ml  Q4H  PRN


 PO  3/17/18 13:15


 4/16/18 13:14   


 


 


 Magnesium


 Hydroxide


  (Milk Of


 Magnesia Susp)  30 ml  Q12H  PRN


 PO  3/17/18 13:15


 4/16/18 13:14   


 


 


 Ondansetron HCl


  (Zofran Inj)  4 mg  Q6H  PRN


 IV  3/17/18 13:15


 4/16/18 13:14   


 


 


 Albuterol


  (Ventolin Hfa


 Inhaler)  2 puffs  QID


 INH  3/17/18 17:00


 4/16/18 16:59  3/20/18 16:44


2 PUFFS


 


 Aspirin


  (Ecotrin Tab)  81 mg  DAILY


 PO  3/18/18 09:00


 4/17/18 08:59  3/20/18 08:20


81 MG


 


 Atorvastatin


 Calcium


  (Lipitor Tab)  40 mg  DAILY


 PO  3/18/18 09:00


 4/17/18 08:59 Future Hold 3/19/18 08:24


40 MG


 


 Cetirizine HCl


  (zyrTEC TAB)  10 mg  DAILY  PRN


 PO  3/17/18 13:45


 4/16/18 13:44  3/19/18 20:53


10 MG


 


 Felodipine


  (Plendil Tabcr)  5 mg  QAM


 PO  3/18/18 09:00


 4/17/18 08:59  3/20/18 08:20


5 MG


 


 Sodium Chloride


  (Ocean Nasal


 Spray)  2 sprays  UD  PRN


 NA  3/17/18 13:45


 4/16/18 13:44   


 


 


 Magnesium Oxide


  (Mag-Ox Tab)  200 mg  DAILY


 PO  3/18/18 09:00


 4/17/18 08:59  3/20/18 08:20


200 MG


 


 Pantoprazole


 Sodium


  (Protonix Tab)  40 mg  QAM


 PO  3/18/18 09:00


 4/17/18 08:59  3/20/18 09:29


40 MG


 


 Albuterol/


 Ipratropium


  (Duoneb)  3 ml  QIDR


 INH  3/17/18 16:00


 4/16/18 15:59  3/17/18 19:23


3 ML


 


 Famotidine 20 mg/


 Syringe  5 ml @ 2.5


 mls/min  Q12H


 IV  3/18/18 09:00


 4/17/18 08:59  3/20/18 08:19


2.5 MLS/MIN


 


 Vancomycin HCl


 1250 mg/Sodium


 Chloride  275 ml @ 


 125 mls/hr  Q18H


 IV  3/18/18 22:00


 3/28/18 21:59  3/20/18 10:31


125 MLS/HR


 


 Furosemide 20 mg/


 Syringe  2 ml @ 4


 mls/min  DAILY


 IV  3/19/18 09:00


 4/18/18 08:59 Future Hold 3/19/18 08:23


4 MLS/MIN


 


 Lisinopril


  (Zestril Tab)  10 mg  QAM


 PO  3/19/18 09:00


 4/18/18 08:59 Future Hold 3/19/18 08:24


10 MG


 


 Diphenhydramine


 HCl


  (Benadryl Inj)  50 mg  BID


 IV.  3/19/18 21:00


 4/18/18 20:59 Future Hold 3/19/18 20:53


50 MG


 


 Metoprolol


 Succinate


  (Toprol Xl Tab)  25 mg  BID


 PO  3/19/18 21:00


 4/18/18 08:59  3/20/18 08:19


25 MG


 


 Heparin Sodium


  (Porcine)


  (Heparin Sq 5000


 Unit/0.5ml)  5,000 unit  Q8


 SQ  3/19/18 14:00


 4/18/18 13:59  3/20/18 13:49


5,000 UNIT


 


 Gentamicin Sulfate


  (Gentamicin 0.3%


 Oph Oint)  1 appln  [id number]


 OP  3/19/18 13:00


 3/29/18 12:59  3/20/18 15:32


1 APPLN


 


 Potassium


 Chloride/Sodium


 Chloride  1,000 ml @ 


 60 mls/hr  [id number] ONCE


 IV  3/20/18 05:00


 3/20/18 21:39  3/20/18 05:28


60 MLS/HR


 


 Acetaminophen


  (Tylenol Tab)  325 mg  Q6H  PRN


 PO  3/20/18 05:00


 4/16/18 13:14

## 2018-03-20 NOTE — CARDIOLOGY FOLLOW-UP
Subjective


General


Date of Service:


Mar 20, 2018.


Chief Complaint:  Facial cellulitis


Pt evaluation today including:  conversation w/ patient, physical exam, chart 

review, lab review, review of studies, review of inpatient medication list





History of Present Illness


Feeling better overall.  Ambulate in the hallway for therapy, using a walker, 

without significant difficulty.  Denies chest pain, dyspnea, palpitations, 

orthopnea, PND, or lower extremity peripheral edema.





Telemetry: Sinus/sinus tachycardia with a left bundle branch block pattern. 

Occasional PAC's. No atrial fibrillation.  One episode of SVT versus PAT on 3/19

/18 at 20:51:52





March 17, 2018 TTE Interpretation Summary ([facility], Dr. Gandara): Left 

ventricular systolic function is moderately reduced. Ejection Fraction = 30-35%

. Septal motion is consistent with conduction abnormality. Otherwise moderate 

diffuse hypokinesis. The left atrium is moderately dilated. Aortic valve 

sclerosis mild, without significant aortic valvular stenosis. There is moderate 

to severe mitral annular calcification. There is mild mitral regurgitation.





Allergies


Coded Allergies:  


     Latex1 -Allergic Contact Dermititis (Verified  Allergy, Unknown, SLIGHT 

RASH, 3/17/18)


     Doxycycline (Unverified  Adverse Reaction, Unknown, GI UPSET, 3/17/18)


     Meperidine (Unverified  Adverse Reaction, Unknown, GI UPSET, 3/17/18)


     Propoxyphene (Unverified  Adverse Reaction, Unknown, GI UPSET, 3/17/18)


     Sulfa Antibiotics (Unverified  Adverse Reaction, Unknown, GI UPSET, 3/17/18

)





Social History


Smoking Status:  Never Smoker


Hx Tobacco Use In Past Year?:  No


Hx Alcohol Use - Type And Amou:  Yes (1 GLASS WINE/DAY)


Hx Substance Use - Type And Am:  No





Problem List


Medical Problems:


(1) NSTEMI (non-ST elevated myocardial infarction)


Status: Acute  





(2) Periorbital cellulitis


Status: Acute  





(3) Sepsis


Status: Acute  











Review of Systems


Respiratory:  + dyspnea on exertion, No cough, No wheezing, No shortness of 

breath, No dyspnea at rest, No hemoptysis


Cardiac:  No chest pain, No orthopnea, No PND, No edema, No claudication, No 

palpitations





Physical Exam


Vital Signs





Last Vital Signs Documentation








  Date Time  Temp Pulse Resp B/P (MAP) Pulse Ox O2 Delivery O2 Flow Rate FiO2


 


3/20/18 08:20      Room Air  


 


3/20/18 08:09 37.3 75 22 148/86 (106) 95   


 


3/20/18 04:00       2.0 











Physical Exam


Constitutional:  


   Level of Distress:  acutely ill


Psychiatric:  


   Mental Status:  active & alert


   Orientation:  to time, to place, to person


   Memory:  recent memory normal, remote memory normal


ENMT:  pertinent finding (improved facial erythema and edema)


Neck:  pertinent finding (Normal JVP)


Lungs:  


   Auscultation:  no wheezing, no rales/crackles, no rhonchi, decreased breath 

sounds


Cardiovascular:  


   Heart Auscultation:  RRR, no murmurs, no rubs, II/VI KRISTINE, gallop


Peripheral Pulses:  


   Dorsalis Pedis Pulse:  normal on the left, normal on the right


Abdomen:  


   Bowel Sounds:  normal


   Inspection & Palpation:  soft, non-distended, no masses


Extremities:  no edema (in the lower extremities), no clubbing


Neurologic:  


   Cranial Nerves:  grossly intact





Assessment and Plan


Assessment and Plan


Admission following a mechanical fall, also with significant periorbital and 

facial cellulitis, rhabdomyolysis, acute renal insufficiency (improved)


Elevated troponin, asymptomatic in regards to angina symptoms. Suspect 

rhabdomyolysis, infection, acute renal dysfunction


Echocardiography with reduction in left ventricular systolic function, EF 30-35%

.  


Chronic left bundle branch block. 


Compensated systolic congestive heart failure signs and symptoms. 


Hypertension.


Dyslipidemia.


Hypokalemia.


 


RECOMMENDATIONS:  


Continue without lisinopril (? angioedema)  and atorvastatin (rhabdomyolysis)


Agree with IV fluids, supplemental potassium


Continue to hold furosemide


Eventual ischemic evaluation, Lexiscan versus diagnostic cardiac 

catheterization down the road (outpatient). 





Cardiology Attending Physician:





Patient seen and examined at the bedside.  Facial erythema, edema mildly 

improved today.  CPK trending upward.  Renal function remains stable.  Denies 

chest discomfort.  Previously reported dyspnea on exertion has improved.  

Offers no other complaints at this time.





PE: VSS. GEN: NAD. AAOx3.  Heart: Regular, normal S1-S2.  No murmur.  Lungs: 

Clear bilateral, no rales, rhonchi, wheeze.  Extremities: No lower extremity 

edema.





A/P: Agree with above PA-C history, physical exam, assessment and plan.  Hold 

ACE inhibitor and statin therapy currently.  Repeat BMP and CPK in a.m.  

Ischemic evaluation in the future when acute infectious process has resolved.  

Discontinue diuretic therapy.  Gentle IV hydration.





Solo Gandara DO, [facility]





Laboratory Results





Last 24 Hours








Test


  3/19/18


12:18 3/19/18


15:32 3/20/18


01:49


 


Sodium Level 134 mmol/L   135 mmol/L 


 


Potassium Level 3.7 mmol/L   3.4 mmol/L 


 


Chloride Level 99 mmol/L   101 mmol/L 


 


Carbon Dioxide Level 24 mmol/L   26 mmol/L 


 


Anion Gap 10.0 mmol/L   8.0 mmol/L 


 


Blood Urea Nitrogen 14 mg/dl   13 mg/dl 


 


Creatinine 0.86 mg/dl   0.80 mg/dl 


 


Est Creatinine Clear Calc


Drug Dose 50.4 ml/min 


  


  54.2 ml/min 


 


 


Estimated GFR (


American) 75.0 


  


  81.8 


 


 


Estimated GFR (Non-


American 64.7 


  


  70.6 


 


 


BUN/Creatinine Ratio 15.8   15.7 


 


Random Glucose 116 mg/dl   108 mg/dl 


 


Lactic Acid Level 2.3 mmol/L   0.9 mmol/L 


 


Calcium Level 8.9 mg/dl   8.4 mg/dl 


 


Total Creatine Kinase 45134 U/L   64628 U/L 


 


Troponin I 0.923 ng/ml   


 


Vancomycin Level Trough  12.5 mcg/ml  


 


White Blood Count   16.74 K/uL 


 


Red Blood Count   3.81 M/uL 


 


Hemoglobin   11.5 g/dL 


 


Hematocrit   32.7 % 


 


Mean Corpuscular Volume   85.8 fL 


 


Mean Corpuscular Hemoglobin   30.2 pg 


 


Mean Corpuscular Hemoglobin


Concent 


  


  35.2 g/dl 


 


 


Platelet Count   151 K/uL 


 


Mean Platelet Volume   10.3 fL 


 


Neutrophils (%) (Auto)   85.7 % 


 


Lymphocytes (%) (Auto)   8.0 % 


 


Monocytes (%) (Auto)   5.7 % 


 


Eosinophils (%) (Auto)   0.1 % 


 


Basophils (%) (Auto)   0.1 % 


 


Neutrophils # (Auto)   14.36 K/uL 


 


Lymphocytes # (Auto)   1.34 K/uL 


 


Monocytes # (Auto)   0.96 K/uL 


 


Eosinophils # (Auto)   0.01 K/uL 


 


Basophils # (Auto)   0.01 K/uL 


 


RDW Standard Deviation   45.2 fL 


 


RDW Coefficient of Variation   14.3 % 


 


Immature Granulocyte % (Auto)   0.4 % 


 


Immature Granulocyte # (Auto)   0.06 K/uL 


 


Activated Partial


Thromboplast Time 


  


  31.0 SECONDS 


 


 


Partial Thromboplastin Ratio   1.2 


 


Magnesium Level   1.9 mg/dl 


 


Total Bilirubin   0.8 mg/dl 


 


Aspartate Amino Transf


(AST/SGOT) 


  


  572 U/L 


 


 


Alanine Aminotransferase


(ALT/SGPT) 


  


  172 U/L 


 


 


Alkaline Phosphatase   133 U/L 


 


Total Protein   6.6 gm/dl 


 


Albumin   2.4 gm/dl 


 


Globulin   4.2 gm/dl 


 


Albumin/Globulin Ratio   0.6

## 2018-03-20 NOTE — SURGERY PROGRESS NOTE
Surgery Progress Note


Date of Service


Mar 20, 2018.





Subjective


No complaints


Patient feels that her face looks better than yesterday.  Denies eye pain, 

vision change. States she is able to move her eyes in all directions. Has had 

ophthalmic ointment applied as directed, doesn't remember having warm compress 

to left eye.





Objective


Vital Signs:











  Date Time  Temp Pulse Resp B/P (MAP) Pulse Ox O2 Delivery O2 Flow Rate FiO2


 


3/20/18 04:12 37.1 71 18 150/86 (107) 92   


 


3/20/18 04:00      Nasal Cannula 2.0 


 


3/20/18 00:12 37.1 69 16 135/83 (100) 95   


 


3/20/18 00:00      Nasal Cannula 2.0 


 


3/19/18 20:00      Nasal Cannula 2.0 


 


3/19/18 19:03 37.3 80 16 129/92 (104) 93 Nasal Cannula 2.0 


 


3/19/18 16:00      Nasal Cannula 2.0 


 


3/19/18 15:16 36.9 82 18 119/68 (85) 92 Nasal Cannula 2.0 


 


3/19/18 12:00      Nasal Cannula 2.0 


 


3/19/18 11:13 39.0 101 20 136/87 (103) 96 Nasal Cannula 2.0 


 


3/19/18 08:00      Nasal Cannula 2.0 


 


3/19/18 07:58 37.0 97 20 160/83 (108) 93 Room Air  








General Appearance:  no apparent distress


Laboratory Results:





Results Past 24 Hours








Test


  3/19/18


07:38 3/19/18


12:18 3/19/18


15:32 3/20/18


01:49 Range/Units


 


 


Total Creatine Kinase 55470 25206  66462   U/L


 


Sodium Level  134  135 136-145  mmol/L


 


Potassium Level  3.7  3.4 3.5-5.1  mmol/L


 


Chloride Level  99  101   mmol/L


 


Carbon Dioxide Level  24  26 21-32  mmol/L


 


Anion Gap  10.0  8.0 3-11  mmol/L


 


Blood Urea Nitrogen  14  13 7-18  mg/dl


 


Creatinine


  


  0.86


  


  0.80


  0.60-1.20


mg/dl


 


Est Creatinine Clear Calc


Drug Dose 


  50.4


  


  54.2


   ml/min


 


 


Estimated GFR (


American) 


  75.0


  


  81.8


   


 


 


Estimated GFR (Non-


American 


  64.7


  


  70.6


   


 


 


BUN/Creatinine Ratio  15.8  15.7 10-20  


 


Random Glucose  116  108 70-99  mg/dl


 


Lactic Acid Level  2.3  0.9 0.4-2.0  mmol/L


 


Calcium Level  8.9  8.4 8.5-10.1  mg/dl


 


Troponin I  0.923   0-0.045  ng/ml


 


Vancomycin Level Trough


  


  


  12.5


  


  SEE COMMENT


mcg/ml


 


White Blood Count    16.74 4.8-10.8  K/uL


 


Red Blood Count    3.81 4.2-5.4  M/uL


 


Hemoglobin    11.5 12.0-16.0  g/dL


 


Hematocrit    32.7 37-47  %


 


Mean Corpuscular Volume    85.8   fL


 


Mean Corpuscular Hemoglobin    30.2 25-34  pg


 


Mean Corpuscular Hemoglobin


Concent 


  


  


  35.2


  32-36  g/dl


 


 


Platelet Count    151 130-400  K/uL


 


Mean Platelet Volume    10.3 7.4-10.4  fL


 


Neutrophils (%) (Auto)    85.7  %


 


Lymphocytes (%) (Auto)    8.0  %


 


Monocytes (%) (Auto)    5.7  %


 


Eosinophils (%) (Auto)    0.1  %


 


Basophils (%) (Auto)    0.1  %


 


Neutrophils # (Auto)    14.36 1.4-6.5  K/uL


 


Lymphocytes # (Auto)    1.34 1.2-3.4  K/uL


 


Monocytes # (Auto)    0.96 0.11-0.59  K/uL


 


Eosinophils # (Auto)    0.01 0-0.5  K/uL


 


Basophils # (Auto)    0.01 0-0.2  K/uL


 


RDW Standard Deviation    45.2 36.4-46.3  fL


 


RDW Coefficient of Variation    14.3 11.5-14.5  %


 


Immature Granulocyte % (Auto)    0.4  %


 


Immature Granulocyte # (Auto)    0.06 0.00-0.02  K/uL


 


Activated Partial


Thromboplast Time 


  


  


  31.0


  21.0-31.0


SECONDS


 


Partial Thromboplastin Ratio    1.2  


 


Magnesium Level    1.9 1.8-2.4  mg/dl


 


Total Bilirubin    0.8 0.2-1  mg/dl


 


Aspartate Amino Transf


(AST/SGOT) 


  


  


  572


  15-37  U/L


 


 


Alanine Aminotransferase


(ALT/SGPT) 


  


  


  172


  12-78  U/L


 


 


Alkaline Phosphatase    133   U/L


 


Total Protein    6.6 6.4-8.2  gm/dl


 


Albumin    2.4 3.4-5.0  gm/dl


 


Globulin    4.2 2.5-4.0  gm/dl


 


Albumin/Globulin Ratio    0.6 0.9-2  








facial edema appears improved from yesterday. Still difficult to fully open 

eyes due to purulent drainage from left and edema bilaterally. No palpable area 

of fluctuance/abscess





Assessment & Plan


Facial edema/cellulitis


1. Continue with warm compress to left eye to aid in removal of purulent 

drainage. Apply gentamicin ophthalmic 4 times daily 


2. could consider repeat CT to look for drainable collection, although I do not 

feel evidence of an abscess on clinical exam


3. WBC trending down and cellulitis appears minimally improved, continue with 

IV abx as prescribed





Will discuss with Dr. Caicedo

## 2018-03-20 NOTE — DIAGNOSTIC IMAGING REPORT
CHEST ONE VIEW PORTABLE



CLINICAL HISTORY: 77 years-old Female presenting with low  o2. 



TECHNIQUE: Portable upright AP view of the chest was obtained.



COMPARISON: 3/17/2018.



FINDINGS:

The patient is YOHAN rotated. Atherosclerosis of aortic arch. Cardiac silhouette

mildly enlarged. Mild prominence of pulmonary vasculature. No focal lung

opacity. No large effusion or pneumothorax. Right calcified pleural plaques

suggested. Degenerative changes of the thoracic spine. Upper abdomen normal.



IMPRESSION:

1.  Mild cardiomegaly with possible volume overload. No other evidence of acute

cardiopulmonary disease.



2.  Calcified pleural plaques could suggest a history of asbestos exposure.







Electronically signed by:  Clark Diaz M.D.

3/20/2018 7:08 AM



Dictated Date/Time:  3/20/2018 7:07 AM

## 2018-03-21 VITALS
DIASTOLIC BLOOD PRESSURE: 92 MMHG | SYSTOLIC BLOOD PRESSURE: 153 MMHG | OXYGEN SATURATION: 95 % | HEART RATE: 72 BPM | TEMPERATURE: 99.14 F

## 2018-03-21 VITALS
TEMPERATURE: 98.24 F | DIASTOLIC BLOOD PRESSURE: 82 MMHG | OXYGEN SATURATION: 96 % | SYSTOLIC BLOOD PRESSURE: 129 MMHG | HEART RATE: 72 BPM

## 2018-03-21 VITALS
TEMPERATURE: 98.42 F | HEART RATE: 79 BPM | SYSTOLIC BLOOD PRESSURE: 165 MMHG | DIASTOLIC BLOOD PRESSURE: 105 MMHG | OXYGEN SATURATION: 93 %

## 2018-03-21 VITALS
SYSTOLIC BLOOD PRESSURE: 151 MMHG | HEART RATE: 70 BPM | TEMPERATURE: 97.88 F | OXYGEN SATURATION: 95 % | DIASTOLIC BLOOD PRESSURE: 78 MMHG

## 2018-03-21 VITALS
DIASTOLIC BLOOD PRESSURE: 80 MMHG | HEART RATE: 71 BPM | TEMPERATURE: 99.14 F | SYSTOLIC BLOOD PRESSURE: 158 MMHG | OXYGEN SATURATION: 97 %

## 2018-03-21 VITALS — DIASTOLIC BLOOD PRESSURE: 85 MMHG | SYSTOLIC BLOOD PRESSURE: 158 MMHG

## 2018-03-21 LAB
ALBUMIN SERPL-MCNC: 2.7 GM/DL (ref 3.4–5)
ALP SERPL-CCNC: 136 U/L (ref 45–117)
ALT SERPL-CCNC: 229 U/L (ref 12–78)
AST SERPL-CCNC: 555 U/L (ref 15–37)
BUN SERPL-MCNC: 18 MG/DL (ref 7–18)
CALCIUM SERPL-MCNC: 9.4 MG/DL (ref 8.5–10.1)
CO2 SERPL-SCNC: 24 MMOL/L (ref 21–32)
CREAT SERPL-MCNC: 1.02 MG/DL (ref 0.6–1.2)
GLUCOSE SERPL-MCNC: 175 MG/DL (ref 70–99)
POTASSIUM SERPL-SCNC: 3.4 MMOL/L (ref 3.5–5.1)
PROT SERPL-MCNC: 7.4 GM/DL (ref 6.4–8.2)
PTT PATIENT: 26.2 SECONDS (ref 21–31)
SODIUM SERPL-SCNC: 136 MMOL/L (ref 136–145)

## 2018-03-21 RX ADMIN — VANCOMYCIN HYDROCHLORIDE SCH MLS/HR: 1 INJECTION, POWDER, LYOPHILIZED, FOR SOLUTION INTRAVENOUS at 20:09

## 2018-03-21 RX ADMIN — GENTAMICIN SULFATE SCH APPLN: 3 OINTMENT OPHTHALMIC at 16:24

## 2018-03-21 RX ADMIN — HYDRALAZINE HYDROCHLORIDE SCH MG: 10 TABLET ORAL at 13:35

## 2018-03-21 RX ADMIN — FAMOTIDINE SCH MLS/MIN: 10 INJECTION INTRAVENOUS at 08:17

## 2018-03-21 RX ADMIN — HEPARIN SODIUM SCH UNIT: 10000 INJECTION, SOLUTION INTRAVENOUS; SUBCUTANEOUS at 13:36

## 2018-03-21 RX ADMIN — ISOSORBIDE DINITRATE SCH MG: 5 TABLET ORAL at 16:26

## 2018-03-21 RX ADMIN — PIPERACILLIN AND TAZOBACTAM SCH MLS/HR: 3; .375 INJECTION, POWDER, LYOPHILIZED, FOR SOLUTION INTRAVENOUS; PARENTERAL at 08:17

## 2018-03-21 RX ADMIN — ALBUTEROL SULFATE SCH PUFFS: 90 AEROSOL, METERED RESPIRATORY (INHALATION) at 16:24

## 2018-03-21 RX ADMIN — METOPROLOL SUCCINATE SCH MG: 25 TABLET, EXTENDED RELEASE ORAL at 08:16

## 2018-03-21 RX ADMIN — GENTAMICIN SULFATE SCH APPLN: 3 OINTMENT OPHTHALMIC at 10:29

## 2018-03-21 RX ADMIN — PANTOPRAZOLE SCH MG: 40 TABLET, DELAYED RELEASE ORAL at 08:16

## 2018-03-21 RX ADMIN — Medication SCH MG: at 08:16

## 2018-03-21 RX ADMIN — HEPARIN SODIUM SCH UNIT: 10000 INJECTION, SOLUTION INTRAVENOUS; SUBCUTANEOUS at 05:58

## 2018-03-21 RX ADMIN — PIPERACILLIN AND TAZOBACTAM SCH MLS/HR: 3; .375 INJECTION, POWDER, LYOPHILIZED, FOR SOLUTION INTRAVENOUS; PARENTERAL at 16:24

## 2018-03-21 RX ADMIN — FAMOTIDINE SCH MLS/MIN: 10 INJECTION INTRAVENOUS at 20:10

## 2018-03-21 RX ADMIN — ALBUTEROL SULFATE SCH PUFFS: 90 AEROSOL, METERED RESPIRATORY (INHALATION) at 20:10

## 2018-03-21 RX ADMIN — ALBUTEROL SULFATE SCH PUFFS: 90 AEROSOL, METERED RESPIRATORY (INHALATION) at 13:34

## 2018-03-21 RX ADMIN — GENTAMICIN SULFATE SCH APPLN: 3 OINTMENT OPHTHALMIC at 13:35

## 2018-03-21 RX ADMIN — FELODIPINE SCH MG: 5 TABLET, FILM COATED, EXTENDED RELEASE ORAL at 08:16

## 2018-03-21 RX ADMIN — VANCOMYCIN HYDROCHLORIDE SCH MLS/HR: 1 INJECTION, POWDER, LYOPHILIZED, FOR SOLUTION INTRAVENOUS at 04:50

## 2018-03-21 RX ADMIN — GENTAMICIN SULFATE SCH APPLN: 3 OINTMENT OPHTHALMIC at 05:58

## 2018-03-21 RX ADMIN — HEPARIN SODIUM SCH UNIT: 10000 INJECTION, SOLUTION INTRAVENOUS; SUBCUTANEOUS at 20:13

## 2018-03-21 RX ADMIN — HYDRALAZINE HYDROCHLORIDE SCH MG: 10 TABLET ORAL at 20:10

## 2018-03-21 RX ADMIN — METOPROLOL SUCCINATE SCH MG: 25 TABLET, EXTENDED RELEASE ORAL at 20:11

## 2018-03-21 RX ADMIN — ISOSORBIDE DINITRATE SCH MG: 5 TABLET ORAL at 11:33

## 2018-03-21 RX ADMIN — ALBUTEROL SULFATE SCH PUFFS: 90 AEROSOL, METERED RESPIRATORY (INHALATION) at 08:16

## 2018-03-21 NOTE — CARDIOLOGY FOLLOW-UP
Subjective


General


Date of Service:


Mar 21, 2018.


Chief Complaint:  Facial cellulitis


Pt evaluation today including:  conversation w/ patient, physical exam, chart 

review, lab review, review of studies, review of inpatient medication list





History of Present Illness


Patient seen and examined. 





Denies chest pain, dyspnea, palpitations, orthopnea, PND, or lower extremity 

peripheral edema.





Blood pressures have trended up. She is off of lisinopril, terazosin, and 

hydrochlorothiazide.  Atenolol switch to Toprol-XL this admission.





Laboratory work requested (comprehensive metabolic panel, CPK, and MIKEL screen) 

is currently pending. 





Telemetry: Sinus at 76 bpm with occasional premature atrial complexes and 

premature ventricular complexes.  One short episode of SVT at 07:53:47, 

asymptomatic.  No atrial fibrillation.





Allergies


Coded Allergies:  


     Latex1 -Allergic Contact Dermititis (Verified  Allergy, Unknown, SLIGHT 

RASH, 3/17/18)


     Doxycycline (Unverified  Adverse Reaction, Unknown, GI UPSET, 3/17/18)


     Meperidine (Unverified  Adverse Reaction, Unknown, GI UPSET, 3/17/18)


     Propoxyphene (Unverified  Adverse Reaction, Unknown, GI UPSET, 3/17/18)


     Sulfa Antibiotics (Unverified  Adverse Reaction, Unknown, GI UPSET, 3/17/18

)





Social History


Smoking Status:  Never Smoker


Hx Tobacco Use In Past Year?:  No


Hx Alcohol Use - Type And Amou:  Yes (1 GLASS WINE/DAY)


Hx Substance Use - Type And Am:  No





Problem List


Medical Problems:


(1) NSTEMI (non-ST elevated myocardial infarction)


Status: Acute  





(2) Periorbital cellulitis


Status: Acute  





(3) Sepsis


Status: Acute  











Review of Systems


Respiratory:  No cough, No sputum, No wheezing, No shortness of breath, No 

dyspnea at rest, No hemoptysis


Cardiac:  No chest pain, No orthopnea, No PND, No edema, No claudication, No 

palpitations





Physical Exam


Vital Signs





Last Vital Signs Documentation








  Date Time  Temp Pulse Resp B/P (MAP) Pulse Ox O2 Delivery O2 Flow Rate FiO2


 


3/21/18 08:15      Room Air  


 


3/21/18 08:02 37.3 71 20 158/80 (106) 97   


 


3/20/18 04:00       2.0 











Physical Exam


Constitutional:  


   Level of Distress:  acutely ill


Psychiatric:  


   Mental Status:  active & alert


   Orientation:  to time, to place, to person


   Memory:  recent memory normal, remote memory normal


ENMT:  pertinent finding (improved facial erythema and edema)


Neck:  pertinent finding (Normal JVP)


Lungs:  


   Auscultation:  no wheezing, no rales/crackles, no rhonchi, decreased breath 

sounds


Cardiovascular:  


   Heart Auscultation:  RRR, no murmurs, no rubs, II/VI KRISTINE, gallop


Peripheral Pulses:  


   Dorsalis Pedis Pulse:  normal on the left, normal on the right


Abdomen:  


   Bowel Sounds:  normal


   Inspection & Palpation:  soft, non-distended, no masses


Extremities:  no edema (in the lower extremities), no clubbing


Neurologic:  


   Cranial Nerves:  grossly intact





Assessment and Plan


Assessment and Plan


Admission following a mechanical fall, also with significant periorbital and 

facial cellulitis, rhabdomyolysis, acute renal insufficiency (improved)


Elevated troponin, asymptomatic in regards to angina symptoms. Suspect 

rhabdomyolysis, infection, acute renal dysfunction


Echocardiography with reduction in left ventricular systolic function, EF 30-35%

.  


Chronic left bundle branch block. 


Compensated systolic congestive heart failure signs and symptoms. 


Hypertension.


Dyslipidemia.


Hypokalemia.


 


RECOMMENDATIONS:  


Continue without lisinopril (? angioedema)  and atorvastatin (rhabdomyolysis)


Await CMP, CPK, and MIKEL screen.


Add nitrates and hydralazine for additional blood pressure and the moderate 

reduction in LV systolic function


Eventual ischemic evaluation, Lexiscan versus diagnostic cardiac 

catheterization down the road (outpatient). 





Cardiology Attending Physician:





Patient seen and examined at the bedside.  Facial erythema, edema improved.  

CPK significantly improved.  Renal function remains stable.  Denies chest 

discomfort.  Previously reported dyspnea on exertion has improved.  Offers no 

other complaints at this time.





PE: VSS. GEN: NAD. AAOx3.  Heart: Regular, normal S1-S2.  No murmur.  Lungs: 

Clear bilateral, no rales, rhonchi, wheeze.  Extremities: No lower extremity 

edema.





A/P: Agree with above PA-C history, physical exam, assessment and plan.  Hold 

ACE inhibitor and statin therapy currently.  Hydralazine nitrates will be added 

today with consideration for ARB in the future.  Repeat BMP and CPK in a.m. 

ischemic evaluation in the future when acute infectious process has resolved.  

Continue to hold diuretic therapy. 





Solo Gandara DO Merged with [facility]





Laboratory Results





Last 24 Hours








Test


  3/21/18


03:26 3/21/18


09:37


 


Activated Partial


Thromboplast Time 26.2 SECONDS 


  


 


 


Partial Thromboplastin Ratio 1.0  


 


Vancomycin Level Trough 14.3 mcg/ml

## 2018-03-21 NOTE — SURGERY PROGRESS NOTE
Surgery Progress Note


Date of Service


Mar 21, 2018.





Subjective


Patient offers no concerns. States she has been doing warm compress to left eye 

and ophthalmic ointment which helps alleviate discomfort symptoms. Continues to 

deny eye pain and vision change. Overall feels better.





Objective


Vital Signs:











  Date Time  Temp Pulse Resp B/P (MAP) Pulse Ox O2 Delivery O2 Flow Rate FiO2


 


3/21/18 12:15      Room Air  


 


3/21/18 12:00 36.6 70 22 151/78 (102) 95 Room Air  


 


3/21/18 08:15      Room Air  


 


3/21/18 08:02 37.3 71 20 158/80 (106) 97 Room Air  


 


3/21/18 04:00      Room Air  


 


3/21/18 03:26    158/85 (109)    


 


3/21/18 03:23 36.9 79 18 165/105 (125) 93 Room Air  





    170/83 (112)    


 


3/21/18 00:00      Room Air  


 


3/20/18 23:51 36.7 68 16 147/79 (101) 94   


 


3/20/18 19:30 37.0 78 16 159/83 (108) 94 Room Air  


 


3/20/18 19:30      Room Air  


 


3/20/18 16:00      Room Air  


 


3/20/18 15:42 36.9 76 20 147/80 (102) 95 Room Air  








General Appearance:  no apparent distress


Laboratory Results:





Results Past 24 Hours








Test


  3/21/18


03:26 3/21/18


09:37 Range/Units


 


 


Activated Partial


Thromboplast Time 26.2


  


  21.0-31.0


SECONDS


 


Partial Thromboplastin Ratio 1.0   


 


Vancomycin Level Trough


  14.3


  


  SEE COMMENT


mcg/ml


 


Sodium Level  136 136-145  mmol/L


 


Potassium Level  3.4 3.5-5.1  mmol/L


 


Chloride Level  103   mmol/L


 


Carbon Dioxide Level  24 21-32  mmol/L


 


Anion Gap  9.0 3-11  mmol/L


 


Blood Urea Nitrogen  18 7-18  mg/dl


 


Creatinine


  


  1.02


  0.60-1.20


mg/dl


 


Est Creatinine Clear Calc


Drug Dose 


  43.0


   ml/min


 


 


Estimated GFR (


American) 


  61.0


   


 


 


Estimated GFR (Non-


American 


  52.6


   


 


 


BUN/Creatinine Ratio  17.8 10-20  


 


Random Glucose  175 70-99  mg/dl


 


Calcium Level  9.4 8.5-10.1  mg/dl


 


Total Bilirubin  0.6 0.2-1  mg/dl


 


Aspartate Amino Transf


(AST/SGOT) 


  555


  15-37  U/L


 


 


Alanine Aminotransferase


(ALT/SGPT) 


  229


  12-78  U/L


 


 


Alkaline Phosphatase  136   U/L


 


Total Creatine Kinase  07711   U/L


 


Total Protein  7.4 6.4-8.2  gm/dl


 


Albumin  2.7 3.4-5.0  gm/dl


 


Globulin  4.7 2.5-4.0  gm/dl


 


Albumin/Globulin Ratio  0.6 0.9-2  








mild continued improvement in facial cellulitis and edema. Patient unable to 

open left eye due to purulent drainage/scab. Warm compress applied bedside and 

forceps used to remove scabbing around left eye and from left eyelid/lashes. 

Patient able to open eye and exam revealed EOMI. No pain with eye movement. 

Tender to palpation in edematous soft tissue around eye. no areas of induration/

fluctuance suggesting abscess





Assessment & Plan


Facial edema/cellulitis


1. Continue with warm compress to left eye to aid in removal of purulent 

drainage. Apply gentamicin ophthalmic 4 times daily 


2. No evidence of abscess on exam. At this point, no surgical intervention is 

needed. Would advise continued conservative measures as noted above and medical 

management. Discussed with patient that we will sign off on her care, but would 

be happy to re-evaluate if anything changes and re-consultation is needed. 





Above findings were discussed with Dr. Caicedo and she is in agreement with 

plan.

## 2018-03-21 NOTE — PROGRESS NOTE
Medicine Progress Note


Date & Time of Visit:


Mar 21, 2018 at 19:01.


Subjective


Patient seen after surgery removed some debris and scabbing from left eye; 

patient states she is feeling better but was horrified at the site of swelling 

and eyes when she looked in the mirror. Still reports some OLIVEROS. No CP. No other 

complaints at this time. Tolerating PO.





Objective





Last 8 Hrs








  Date Time  Temp Pulse Resp B/P (MAP) Pulse Ox O2 Delivery O2 Flow Rate FiO2


 


3/21/18 16:00      Room Air  


 


3/21/18 15:31 36.8 72 20 129/82 (98) 96 Room Air  


 


3/21/18 12:15      Room Air  


 


3/21/18 12:00 36.6 70 22 151/78 (102) 95 Room Air  








Physical Exam:


GENERAL: Patient is in no acute distress.


HEENT: No acute trauma, normocephalic, mucous membranes moist, no nasal 

congestion, no scleral icterus. B/L periorbital erythema and edema, L>R, + left 

eye drainage/crusting


NECK: No stridor, trachea is midline.


LUNGS: Clear to auscultation bilaterally, no wheeze, no rhonchi, breath sounds 

equal.


HEART: Without murmurs gallops or rubs, regular rate and rhythm.


ABDOMEN: Soft, nontender, bowel sounds positive


EXTREMITIES: No cyanosis or edema


NEUROLOGIC: Oriented x 3, no acute motor or sensory deficits, no focal weakness.


SKIN: No rash, no jaundice, no diaphoresis.


Laboratory Results:





Last 24 Hours








Test


  3/21/18


03:26 3/21/18


09:37


 


Activated Partial


Thromboplast Time 26.2 SECONDS 


  


 


 


Partial Thromboplastin Ratio 1.0  


 


Vancomycin Level Trough 14.3 mcg/ml  


 


Sodium Level  136 mmol/L 


 


Potassium Level  3.4 mmol/L 


 


Chloride Level  103 mmol/L 


 


Carbon Dioxide Level  24 mmol/L 


 


Anion Gap  9.0 mmol/L 


 


Blood Urea Nitrogen  18 mg/dl 


 


Creatinine  1.02 mg/dl 


 


Est Creatinine Clear Calc


Drug Dose 


  43.0 ml/min 


 


 


Estimated GFR (


American) 


  61.0 


 


 


Estimated GFR (Non-


American 


  52.6 


 


 


BUN/Creatinine Ratio  17.8 


 


Random Glucose  175 mg/dl 


 


Calcium Level  9.4 mg/dl 


 


Total Bilirubin  0.6 mg/dl 


 


Aspartate Amino Transf


(AST/SGOT) 


  555 U/L 


 


 


Alanine Aminotransferase


(ALT/SGPT) 


  229 U/L 


 


 


Alkaline Phosphatase  136 U/L 


 


Total Creatine Kinase  47891 U/L 


 


Total Protein  7.4 gm/dl 


 


Albumin  2.7 gm/dl 


 


Globulin  4.7 gm/dl 


 


Albumin/Globulin Ratio  0.6 











Assessment & Plan


PRESEPTAL CELLULITIS: sepsis resolved


-having slow improvement of the preseptal cellulitis


-continue IV Vanco and Zosyn


-ID consulted, appreciate recs


-Oromaxillary facial surgery consulted, appreciate recs; gentamicin ophthalmic 

ointment ordered - warm compresses


-CT Facial and soft tissue neck: showing Mild nonspecific cellulitis of the 

right perimandibular and submandibular region.


-no abscess noted on CT


-on presentation patient had significant leukocytosis, lactic acidosis, low 

grade fever


-blood cultures negative


-Surgery was at bedside earlier today and did a mild bedside debridement of 

scabs and crusted purulent material





ELEVATED TROPONIN: probable Type 2 event in the setting of Sepsis 


-known hx of CAD with medical management


-IV heparin stopped


-continue aspirin, statin, b-blocker


-Cardiology consulted, appreciate recs


-TTE: Report:


* -- Conclusions --


* Left ventricular systolic function is moderately reduced.


* Ejection Fraction = 30-35%.


* Septal motion is consistent with conduction abnormality.


* Otherwise moderate diffuse hypokinesis.


* The left atrium is moderately dilated.


* Aortic valve sclerosis mild, without significant aortic valvular stenosis.


* There is moderate to severe mitral annular calcification.


* There is mild mitral regurgitation.


-per outpatient Cardiology notes; patient has been having dyspnea on exertion


-no recent stress test 


-no symptoms of chest pain or palpitations but does have intermittent shortness 

of breath


-troponin elevated to >2 then trended down, CK trending down, maxed at 08956+


-continue aspirin, statin, b-blocker





CHRONIC SYSTOLIC CHF: 


-LVEF reduction, per prior TTE; unknown cause, possibly ischemic cardiomyopathy


-will likely have further w/up - cardiac cath afterwards


-recently started on ACE-I and intermittent Lasix, monitor for overload





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


-uses a cane at baseline


-PT/OT consulted


-did not lose consciousness; fall possibly related to hypovolemia


CT Head negative; additional radiographs negative for fractures





FOUZIA: resolved


-baseline Cr <1


-max was 1.55





HTN:


-bp was low on admission


-held ACE-I, HCTZ due to kidney injury


-continue b-blocker and monitor


Current Inpatient Medications:





Current Inpatient Medications








 Medications


  (Trade)  Dose


 Ordered  Sig/Sirisha


 Route  Start Time


 Stop Time Status Last Admin


Dose Admin


 


 Piperacillin Sod/


 Tazobactam Sod


 3.375 gm/Dextrose  115 ml @ 


 28.75 mls/


 hr  Q8H


 IV  3/17/18 16:00


 3/27/18 15:59  3/21/18 16:24


28.75 MLS/HR


 


 Miscellaneous


 Information


  (Consult)  1 ea  UD  PRN


 N/A  3/17/18 12:30


 4/16/18 12:29   


 


 


 Miscellaneous


 Information


  (Consult)  1 ea  UD  PRN


 N/A  3/17/18 12:30


 4/16/18 12:29   


 


 


 Al Hydrox/Mg


 Hydrox/Simethicone


  (Maalox Max Susp)  15 ml  Q4H  PRN


 PO  3/17/18 13:15


 4/16/18 13:14   


 


 


 Magnesium


 Hydroxide


  (Milk Of


 Magnesia Susp)  30 ml  Q12H  PRN


 PO  3/17/18 13:15


 4/16/18 13:14   


 


 


 Ondansetron HCl


  (Zofran Inj)  4 mg  Q6H  PRN


 IV  3/17/18 13:15


 4/16/18 13:14   


 


 


 Albuterol


  (Ventolin Hfa


 Inhaler)  2 puffs  QID


 INH  3/17/18 17:00


 4/16/18 16:59  3/21/18 16:24


2 PUFFS


 


 Aspirin


  (Ecotrin Tab)  81 mg  DAILY


 PO  3/18/18 09:00


 4/17/18 08:59  3/21/18 08:16


81 MG


 


 Atorvastatin


 Calcium


  (Lipitor Tab)  40 mg  DAILY


 PO  3/18/18 09:00


 4/17/18 08:59 Future Hold 3/19/18 08:24


40 MG


 


 Cetirizine HCl


  (zyrTEC TAB)  10 mg  DAILY  PRN


 PO  3/17/18 13:45


 4/16/18 13:44  3/19/18 20:53


10 MG


 


 Felodipine


  (Plendil Tabcr)  5 mg  QAM


 PO  3/18/18 09:00


 4/17/18 08:59  3/21/18 08:16


5 MG


 


 Sodium Chloride


  (Ocean Nasal


 Spray)  2 sprays  UD  PRN


 NA  3/17/18 13:45


 4/16/18 13:44   


 


 


 Magnesium Oxide


  (Mag-Ox Tab)  200 mg  DAILY


 PO  3/18/18 09:00


 4/17/18 08:59  3/21/18 08:16


200 MG


 


 Pantoprazole


 Sodium


  (Protonix Tab)  40 mg  QAM


 PO  3/18/18 09:00


 4/17/18 08:59  3/21/18 08:16


40 MG


 


 Albuterol/


 Ipratropium


  (Duoneb)  3 ml  QIDR


 INH  3/17/18 16:00


 4/16/18 15:59  3/17/18 19:23


3 ML


 


 Famotidine 20 mg/


 Syringe  5 ml @ 2.5


 mls/min  Q12H


 IV  3/18/18 09:00


 4/17/18 08:59  3/21/18 08:17


2.5 MLS/MIN


 


 Furosemide 20 mg/


 Syringe  2 ml @ 4


 mls/min  DAILY


 IV  3/19/18 09:00


 4/18/18 08:59 Future Hold 3/19/18 08:23


4 MLS/MIN


 


 Lisinopril


  (Zestril Tab)  10 mg  QAM


 PO  3/19/18 09:00


 4/18/18 08:59 Future Hold 3/19/18 08:24


10 MG


 


 Diphenhydramine


 HCl


  (Benadryl Inj)  50 mg  BID


 IV.  3/19/18 21:00


 4/18/18 20:59 Future Hold 3/19/18 20:53


50 MG


 


 Metoprolol


 Succinate


  (Toprol Xl Tab)  25 mg  BID


 PO  3/19/18 21:00


 4/18/18 08:59  3/21/18 08:16


25 MG


 


 Heparin Sodium


  (Porcine)


  (Heparin Sq 5000


 Unit/0.5ml)  5,000 unit  Q8


 SQ  3/19/18 14:00


 4/18/18 13:59  3/21/18 13:36


5,000 UNIT


 


 Gentamicin Sulfate


  (Gentamicin 0.3%


 Oph Oint)  1 appln  4XDQ3H


 OP  3/19/18 13:00


 3/29/18 12:59  3/21/18 16:24


1 APPLN


 


 Acetaminophen


  (Tylenol Tab)  325 mg  Q6H  PRN


 PO  3/20/18 05:00


 4/16/18 13:14   


 


 


 Vancomycin HCl


 1250 mg/Sodium


 Chloride  275 ml @ 


 125 mls/hr  Q16H


 IV  3/21/18 20:00


 3/28/18 21:59   


 


 


 Isosorbide


 Dinitrate


  (Isordil Tab)  5 mg  TID@0700,1200,1700


 PO  3/21/18 12:00


 4/20/18 11:59  3/21/18 16:26


5 MG


 


 Hydralazine HCl


  (Apresoline Tab)  10 mg  TID


 PO  3/21/18 14:00


 4/20/18 13:59  3/21/18 13:35


10 MG

## 2018-03-21 NOTE — INFECTIOUS DISEASE PROGRESS NT
Progress Note


Date of Service


Mar 21, 2018.





Subjective


Pt evaluation today including:  conversation w/ patient, physical exam, chart 

review, lab review, review of studies, conversation w/ consultant, review of 

inpatient medication list


Patient states that she is slowly getting better.  Facial erythema and swelling 

less.  Still with difficulty opening left eye from purulence.  No obvious 

visual difficulties.  Remains afebrile, tolerating antibiotics.





   All Other Systems:  Reviewed and Negative





Medications





Current Inpatient Medications








 Medications


  (Trade)  Dose


 Ordered  Sig/Sirisha


 Route  Start Time


 Stop Time Status Last Admin


Dose Admin


 


 Piperacillin Sod/


 Tazobactam Sod


 3.375 gm/Dextrose  115 ml @ 


 28.75 mls/


 hr  Q8H


 IV  3/17/18 16:00


 3/27/18 15:59  3/21/18 08:17


28.75 MLS/HR


 


 Miscellaneous


 Information


  (Consult)  1 ea  UD  PRN


 N/A  3/17/18 12:30


 4/16/18 12:29   


 


 


 Miscellaneous


 Information


  (Consult)  1 ea  UD  PRN


 N/A  3/17/18 12:30


 4/16/18 12:29   


 


 


 Al Hydrox/Mg


 Hydrox/Simethicone


  (Maalox Max Susp)  15 ml  Q4H  PRN


 PO  3/17/18 13:15


 4/16/18 13:14   


 


 


 Magnesium


 Hydroxide


  (Milk Of


 Magnesia Susp)  30 ml  Q12H  PRN


 PO  3/17/18 13:15


 4/16/18 13:14   


 


 


 Ondansetron HCl


  (Zofran Inj)  4 mg  Q6H  PRN


 IV  3/17/18 13:15


 4/16/18 13:14   


 


 


 Albuterol


  (Ventolin Hfa


 Inhaler)  2 puffs  QID


 INH  3/17/18 17:00


 4/16/18 16:59  3/21/18 13:34


2 PUFFS


 


 Aspirin


  (Ecotrin Tab)  81 mg  DAILY


 PO  3/18/18 09:00


 4/17/18 08:59  3/21/18 08:16


81 MG


 


 Atorvastatin


 Calcium


  (Lipitor Tab)  40 mg  DAILY


 PO  3/18/18 09:00


 4/17/18 08:59 Future Hold 3/19/18 08:24


40 MG


 


 Cetirizine HCl


  (zyrTEC TAB)  10 mg  DAILY  PRN


 PO  3/17/18 13:45


 4/16/18 13:44  3/19/18 20:53


10 MG


 


 Felodipine


  (Plendil Tabcr)  5 mg  QAM


 PO  3/18/18 09:00


 4/17/18 08:59  3/21/18 08:16


5 MG


 


 Sodium Chloride


  (Ocean Nasal


 Spray)  2 sprays  UD  PRN


 NA  3/17/18 13:45


 4/16/18 13:44   


 


 


 Magnesium Oxide


  (Mag-Ox Tab)  200 mg  DAILY


 PO  3/18/18 09:00


 4/17/18 08:59  3/21/18 08:16


200 MG


 


 Pantoprazole


 Sodium


  (Protonix Tab)  40 mg  QAM


 PO  3/18/18 09:00


 4/17/18 08:59  3/21/18 08:16


40 MG


 


 Albuterol/


 Ipratropium


  (Duoneb)  3 ml  QIDR


 INH  3/17/18 16:00


 4/16/18 15:59  3/17/18 19:23


3 ML


 


 Famotidine 20 mg/


 Syringe  5 ml @ 2.5


 mls/min  Q12H


 IV  3/18/18 09:00


 4/17/18 08:59  3/21/18 08:17


2.5 MLS/MIN


 


 Furosemide 20 mg/


 Syringe  2 ml @ 4


 mls/min  DAILY


 IV  3/19/18 09:00


 4/18/18 08:59 Future Hold 3/19/18 08:23


4 MLS/MIN


 


 Lisinopril


  (Zestril Tab)  10 mg  QAM


 PO  3/19/18 09:00


 4/18/18 08:59 Future Hold 3/19/18 08:24


10 MG


 


 Diphenhydramine


 HCl


  (Benadryl Inj)  50 mg  BID


 IV.  3/19/18 21:00


 4/18/18 20:59 Future Hold 3/19/18 20:53


50 MG


 


 Metoprolol


 Succinate


  (Toprol Xl Tab)  25 mg  BID


 PO  3/19/18 21:00


 4/18/18 08:59  3/21/18 08:16


25 MG


 


 Heparin Sodium


  (Porcine)


  (Heparin Sq 5000


 Unit/0.5ml)  5,000 unit  Q8


 SQ  3/19/18 14:00


 4/18/18 13:59  3/21/18 13:36


5,000 UNIT


 


 Gentamicin Sulfate


  (Gentamicin 0.3%


 Oph Oint)  1 appln  4XDQ3H


 OP  3/19/18 13:00


 3/29/18 12:59  3/21/18 13:35


1 APPLN


 


 Acetaminophen


  (Tylenol Tab)  325 mg  Q6H  PRN


 PO  3/20/18 05:00


 4/16/18 13:14   


 


 


 Vancomycin HCl


 1250 mg/Sodium


 Chloride  275 ml @ 


 125 mls/hr  Q16H


 IV  3/21/18 20:00


 3/28/18 21:59   


 


 


 Isosorbide


 Dinitrate


  (Isordil Tab)  5 mg  TID@0700,1200,1700


 PO  3/21/18 12:00


 4/20/18 11:59  3/21/18 11:33


5 MG


 


 Hydralazine HCl


  (Apresoline Tab)  10 mg  TID


 PO  3/21/18 14:00


 4/20/18 13:59  3/21/18 13:35


10 MG











Objective


Vital Signs











  Date Time  Temp Pulse Resp B/P (MAP) Pulse Ox O2 Delivery O2 Flow Rate FiO2


 


3/21/18 12:15      Room Air  


 


3/21/18 12:00 36.6 70 22 151/78 (102) 95 Room Air  


 


3/21/18 08:15      Room Air  


 


3/21/18 08:02 37.3 71 20 158/80 (106) 97 Room Air  


 


3/21/18 04:00      Room Air  


 


3/21/18 03:26    158/85 (109)    


 


3/21/18 03:23 36.9 79 18 165/105 (125) 93 Room Air  





    170/83 (112)    


 


3/21/18 00:00      Room Air  


 


3/20/18 23:51 36.7 68 16 147/79 (101) 94   


 


3/20/18 19:30 37.0 78 16 159/83 (108) 94 Room Air  


 


3/20/18 19:30      Room Air  


 


3/20/18 16:00      Room Air  


 


3/20/18 15:42 36.9 76 20 147/80 (102) 95 Room Air  











Physical Exam


General Appearance:  WD/WN, no apparent distress


Eyes:  normal inspection, EOMI, sclerae normal, + pertinent finding (Purulence 

left eye)


ENT:  hearing grossly normal, pharynx normal, + pertinent finding (Facial 

erythema and swelling better)


Neck:  supple, no adenopathy, thyroid normal, trachea midline


Respiratory/Chest:  chest non-tender, lungs clear, normal breath sounds, no 

respiratory distress


Cardiovascular:  regular rate, rhythm, no gallop, no murmur


Abdomen:  normal bowel sounds, non tender, soft, no organomegaly


Extremities:  non-tender, no calf tenderness


Neurologic/Psychiatric:  alert, normal mood/affect, oriented x 3


Skin:  normal color, + pertinent finding (Facial erythema improving, some skin 

desquamation)


Lymphatic:  no adenopathy





Laboratory Results





Last 24 Hours








Test


  3/21/18


03:26 3/21/18


09:37


 


Activated Partial


Thromboplast Time 26.2 SECONDS 


  


 


 


Partial Thromboplastin Ratio 1.0  


 


Vancomycin Level Trough 14.3 mcg/ml  


 


Sodium Level  136 mmol/L 


 


Potassium Level  3.4 mmol/L 


 


Chloride Level  103 mmol/L 


 


Carbon Dioxide Level  24 mmol/L 


 


Anion Gap  9.0 mmol/L 


 


Blood Urea Nitrogen  18 mg/dl 


 


Creatinine  1.02 mg/dl 


 


Est Creatinine Clear Calc


Drug Dose 


  43.0 ml/min 


 


 


Estimated GFR (


American) 


  61.0 


 


 


Estimated GFR (Non-


American 


  52.6 


 


 


BUN/Creatinine Ratio  17.8 


 


Random Glucose  175 mg/dl 


 


Calcium Level  9.4 mg/dl 


 


Total Bilirubin  0.6 mg/dl 


 


Aspartate Amino Transf


(AST/SGOT) 


  555 U/L 


 


 


Alanine Aminotransferase


(ALT/SGPT) 


  229 U/L 


 


 


Alkaline Phosphatase  136 U/L 


 


Total Creatine Kinase  63252 U/L 


 


Total Protein  7.4 gm/dl 


 


Albumin  2.7 gm/dl 


 


Globulin  4.7 gm/dl 


 


Albumin/Globulin Ratio  0.6 











Assessment and Plan


Facial/periorbital cellulitis, appears to be showi improvement to IV 

antibiotics. Patient should be continued on vancomycin and Zosyn.  Likely 1-2 

more days of IV antibiotics.  Will follow.

## 2018-03-21 NOTE — PHARMACY PROGRESS NOTE
Pharmacy Abx Dose Short Note


Date of Service


Mar 21, 2018.





Assessment & Plan


Assessment








77 year old female receiving Vancomycin and Zosyn for treatment of facial 

cellulitis.


Day # 5 of antimicrobial therapy.


* Blood cultures have NGTD


* renal function at baseline


* ID following








Plan








Vancomycin


* Trough level of 14.3 mcg/mL is subtherapeutic.


* Change to 1250 mg IV every 16 hours


* Goal trough level for facial cellulitis: 15 to 20 mcg/mL


* Trough or level ordered for: 3/23 @0330.





Zosyn


* Continue 3.375 gm IV q 8 hours.





Pharmacy will continue to follow and will adjust dose/frequency as necessary.  

Thank you.

## 2018-03-22 VITALS
SYSTOLIC BLOOD PRESSURE: 169 MMHG | TEMPERATURE: 98.6 F | HEART RATE: 66 BPM | DIASTOLIC BLOOD PRESSURE: 80 MMHG | OXYGEN SATURATION: 94 %

## 2018-03-22 VITALS
OXYGEN SATURATION: 92 % | TEMPERATURE: 97.52 F | SYSTOLIC BLOOD PRESSURE: 162 MMHG | DIASTOLIC BLOOD PRESSURE: 88 MMHG | HEART RATE: 69 BPM

## 2018-03-22 VITALS
SYSTOLIC BLOOD PRESSURE: 150 MMHG | TEMPERATURE: 98.6 F | DIASTOLIC BLOOD PRESSURE: 75 MMHG | HEART RATE: 70 BPM | OXYGEN SATURATION: 94 %

## 2018-03-22 VITALS
DIASTOLIC BLOOD PRESSURE: 84 MMHG | SYSTOLIC BLOOD PRESSURE: 159 MMHG | HEART RATE: 70 BPM | OXYGEN SATURATION: 93 % | TEMPERATURE: 98.42 F

## 2018-03-22 VITALS
SYSTOLIC BLOOD PRESSURE: 145 MMHG | DIASTOLIC BLOOD PRESSURE: 78 MMHG | HEART RATE: 74 BPM | OXYGEN SATURATION: 92 % | TEMPERATURE: 98.24 F

## 2018-03-22 VITALS
OXYGEN SATURATION: 94 % | DIASTOLIC BLOOD PRESSURE: 89 MMHG | TEMPERATURE: 98.78 F | HEART RATE: 68 BPM | SYSTOLIC BLOOD PRESSURE: 164 MMHG

## 2018-03-22 LAB
ALBUMIN SERPL-MCNC: 2.4 GM/DL (ref 3.4–5)
ALP SERPL-CCNC: 105 U/L (ref 45–117)
ALT SERPL-CCNC: 193 U/L (ref 12–78)
AST SERPL-CCNC: 355 U/L (ref 15–37)
BUN SERPL-MCNC: 25 MG/DL (ref 7–18)
CALCIUM SERPL-MCNC: 8.8 MG/DL (ref 8.5–10.1)
CO2 SERPL-SCNC: 24 MMOL/L (ref 21–32)
CREAT SERPL-MCNC: 1.15 MG/DL (ref 0.6–1.2)
CREAT SERPL-MCNC: 1.28 MG/DL (ref 0.6–1.2)
EOSINOPHIL NFR BLD AUTO: 240 K/UL (ref 130–400)
GLUCOSE SERPL-MCNC: 132 MG/DL (ref 70–99)
HCT VFR BLD CALC: 37.3 % (ref 37–47)
HGB BLD-MCNC: 12.7 G/DL (ref 12–16)
MCH RBC QN AUTO: 29.3 PG (ref 25–34)
MCHC RBC AUTO-ENTMCNC: 34 G/DL (ref 32–36)
MCV RBC AUTO: 86.1 FL (ref 80–100)
PMV BLD AUTO: 10.1 FL (ref 7.4–10.4)
POTASSIUM SERPL-SCNC: 3.6 MMOL/L (ref 3.5–5.1)
PROT SERPL-MCNC: 6.5 GM/DL (ref 6.4–8.2)
PTT PATIENT: 25.5 SECONDS (ref 21–31)
RED CELL DISTRIBUTION WIDTH CV: 14.5 % (ref 11.5–14.5)
RED CELL DISTRIBUTION WIDTH SD: 45.9 FL (ref 36.4–46.3)
SODIUM SERPL-SCNC: 137 MMOL/L (ref 136–145)
WBC # BLD AUTO: 11.35 K/UL (ref 4.8–10.8)

## 2018-03-22 RX ADMIN — HEPARIN SODIUM SCH UNIT: 10000 INJECTION, SOLUTION INTRAVENOUS; SUBCUTANEOUS at 05:57

## 2018-03-22 RX ADMIN — METOPROLOL SUCCINATE SCH MG: 25 TABLET, EXTENDED RELEASE ORAL at 20:01

## 2018-03-22 RX ADMIN — ALBUTEROL SULFATE SCH PUFFS: 90 AEROSOL, METERED RESPIRATORY (INHALATION) at 20:00

## 2018-03-22 RX ADMIN — PIPERACILLIN AND TAZOBACTAM SCH MLS/HR: 3; .375 INJECTION, POWDER, LYOPHILIZED, FOR SOLUTION INTRAVENOUS; PARENTERAL at 23:46

## 2018-03-22 RX ADMIN — Medication SCH MG: at 07:54

## 2018-03-22 RX ADMIN — HEPARIN SODIUM SCH UNIT: 10000 INJECTION, SOLUTION INTRAVENOUS; SUBCUTANEOUS at 13:43

## 2018-03-22 RX ADMIN — PIPERACILLIN AND TAZOBACTAM SCH MLS/HR: 3; .375 INJECTION, POWDER, LYOPHILIZED, FOR SOLUTION INTRAVENOUS; PARENTERAL at 07:51

## 2018-03-22 RX ADMIN — GENTAMICIN SULFATE SCH APPLN: 3 OINTMENT OPHTHALMIC at 07:29

## 2018-03-22 RX ADMIN — FELODIPINE SCH MG: 5 TABLET, FILM COATED, EXTENDED RELEASE ORAL at 07:53

## 2018-03-22 RX ADMIN — HEPARIN SODIUM SCH UNIT: 10000 INJECTION, SOLUTION INTRAVENOUS; SUBCUTANEOUS at 20:01

## 2018-03-22 RX ADMIN — GENTAMICIN SULFATE SCH APPLN: 3 OINTMENT OPHTHALMIC at 13:38

## 2018-03-22 RX ADMIN — GENTAMICIN SULFATE SCH APPLN: 3 OINTMENT OPHTHALMIC at 10:34

## 2018-03-22 RX ADMIN — VANCOMYCIN HYDROCHLORIDE SCH MLS/HR: 1 INJECTION, POWDER, LYOPHILIZED, FOR SOLUTION INTRAVENOUS at 12:04

## 2018-03-22 RX ADMIN — FAMOTIDINE SCH MLS/MIN: 10 INJECTION INTRAVENOUS at 20:00

## 2018-03-22 RX ADMIN — FAMOTIDINE SCH MLS/MIN: 10 INJECTION INTRAVENOUS at 07:51

## 2018-03-22 RX ADMIN — ALBUTEROL SULFATE SCH PUFFS: 90 AEROSOL, METERED RESPIRATORY (INHALATION) at 07:52

## 2018-03-22 RX ADMIN — Medication SCH MG: at 07:53

## 2018-03-22 RX ADMIN — PIPERACILLIN AND TAZOBACTAM SCH MLS/HR: 3; .375 INJECTION, POWDER, LYOPHILIZED, FOR SOLUTION INTRAVENOUS; PARENTERAL at 00:07

## 2018-03-22 RX ADMIN — METOPROLOL SUCCINATE SCH MG: 25 TABLET, EXTENDED RELEASE ORAL at 07:53

## 2018-03-22 RX ADMIN — ALBUTEROL SULFATE SCH PUFFS: 90 AEROSOL, METERED RESPIRATORY (INHALATION) at 13:39

## 2018-03-22 RX ADMIN — ISOSORBIDE DINITRATE SCH MG: 5 TABLET ORAL at 16:14

## 2018-03-22 RX ADMIN — ALBUTEROL SULFATE SCH PUFFS: 90 AEROSOL, METERED RESPIRATORY (INHALATION) at 16:12

## 2018-03-22 RX ADMIN — GENTAMICIN SULFATE SCH APPLN: 3 OINTMENT OPHTHALMIC at 16:14

## 2018-03-22 RX ADMIN — PIPERACILLIN AND TAZOBACTAM SCH MLS/HR: 3; .375 INJECTION, POWDER, LYOPHILIZED, FOR SOLUTION INTRAVENOUS; PARENTERAL at 16:11

## 2018-03-22 RX ADMIN — ISOSORBIDE DINITRATE SCH MG: 5 TABLET ORAL at 12:03

## 2018-03-22 RX ADMIN — ISOSORBIDE DINITRATE SCH MG: 5 TABLET ORAL at 07:29

## 2018-03-22 RX ADMIN — HYDRALAZINE HYDROCHLORIDE SCH MG: 10 TABLET ORAL at 07:53

## 2018-03-22 RX ADMIN — PANTOPRAZOLE SCH MG: 40 TABLET, DELAYED RELEASE ORAL at 07:53

## 2018-03-22 NOTE — PROGRESS NOTE
Medicine Progress Note


Date & Time of Visit:


Mar 22, 2018 at 22:27.


Subjective


Patient doing ok, facial swelling improving, patient is able to open her left 

eye more today also. No overnight events noted. No tele events noted. 

Tolerating PO. Has been ambulating without difficulty. Mild OLIVEROS.





Objective





Last 8 Hrs








  Date Time  Temp Pulse Resp B/P (MAP) Pulse Ox O2 Delivery O2 Flow Rate FiO2


 


3/22/18 21:09      Room Air  


 


3/22/18 20:00      Room Air  


 


3/22/18 20:00 37.0 70 16 150/75 (100) 94 Room Air  


 


3/22/18 16:00      Room Air  








Physical Exam:


GENERAL: Patient is in no acute distress.


HEENT: No acute trauma, normocephalic, mucous membranes moist, no nasal 

congestion, no scleral icterus. B/L periorbital erythema and edema, L>R, + left 

eye drainage


NECK: No stridor, trachea is midline.


LUNGS: Clear to auscultation bilaterally, no wheeze, no rhonchi, breath sounds 

equal.


HEART: Without murmurs gallops or rubs, regular rate and rhythm.


ABDOMEN: Soft, nontender, bowel sounds positive


EXTREMITIES: No cyanosis or edema


NEUROLOGIC: Oriented x 3, no acute motor or sensory deficits, no focal weakness.


SKIN: No rash, no jaundice, no diaphoresis.


Laboratory Results:





Last 24 Hours








Test


  3/22/18


06:56 3/22/18


08:55 3/22/18


11:51


 


Activated Partial


Thromboplast Time 25.5 SECONDS 


  


  


 


 


Partial Thromboplastin Ratio 1.0   


 


Creatinine 1.15 mg/dl  1.28 mg/dl  


 


Est Creatinine Clear Calc


Drug Dose 38.2 ml/min 


  34.3 ml/min 


  


 


 


Estimated GFR (


American) 52.8 


  46.4 


  


 


 


Estimated GFR (Non-


American 45.5 


  40.0 


  


 


 


White Blood Count  11.35 K/uL  


 


Red Blood Count  4.33 M/uL  


 


Hemoglobin  12.7 g/dL  


 


Hematocrit  37.3 %  


 


Mean Corpuscular Volume  86.1 fL  


 


Mean Corpuscular Hemoglobin  29.3 pg  


 


Mean Corpuscular Hemoglobin


Concent 


  34.0 g/dl 


  


 


 


RDW Standard Deviation  45.9 fL  


 


RDW Coefficient of Variation  14.5 %  


 


Platelet Count  240 K/uL  


 


Mean Platelet Volume  10.1 fL  


 


Sodium Level  137 mmol/L  


 


Potassium Level  3.6 mmol/L  


 


Chloride Level  104 mmol/L  


 


Carbon Dioxide Level  24 mmol/L  


 


Anion Gap  9.0 mmol/L  


 


Blood Urea Nitrogen  25 mg/dl  


 


BUN/Creatinine Ratio  19.1  


 


Random Glucose  132 mg/dl  


 


Calcium Level  8.8 mg/dl  


 


Total Bilirubin  0.6 mg/dl  


 


Aspartate Amino Transf


(AST/SGOT) 


  355 U/L 


  


 


 


Alanine Aminotransferase


(ALT/SGPT) 


  193 U/L 


  


 


 


Alkaline Phosphatase  105 U/L  


 


Total Creatine Kinase  5152 U/L  


 


Total Protein  6.5 gm/dl  


 


Albumin  2.4 gm/dl  


 


Globulin  4.1 gm/dl  


 


Albumin/Globulin Ratio  0.6  


 


Vancomycin Level Trough   24.3 mcg/ml 











Assessment & Plan


PRESEPTAL CELLULITIS: sepsis resolved


-having slow improvement of the preseptal cellulitis


-continued on IV Vanco and Zosyn


-ID consulted, appreciate recs


-Facial/plastic surgery consulted, appreciate recs; gentamicin ophthalmic 

ointment ordered - warm compresses


-CT Facial and soft tissue neck: showing Mild nonspecific cellulitis of the 

right perimandibular and submandibular region.


-no abscess noted on CT


-on presentation patient had significant leukocytosis, lactic acidosis, low 

grade fever


-blood cultures negative





ELEVATED TROPONIN: probable Type 2 event in the setting of Sepsis 


-known hx of CAD with medical management


-IV heparin stopped


-continue aspirin, statin, b-blocker


-Cardiology consulted, appreciate recs


-TTE: Report:


* -- Conclusions --


* Left ventricular systolic function is moderately reduced.


* Ejection Fraction = 30-35%.


* Septal motion is consistent with conduction abnormality.


* Otherwise moderate diffuse hypokinesis.


* The left atrium is moderately dilated.


* Aortic valve sclerosis mild, without significant aortic valvular stenosis.


* There is moderate to severe mitral annular calcification.


* There is mild mitral regurgitation.


-per outpatient Cardiology notes; patient has been having dyspnea on exertion


-no recent stress test 


-no symptoms of chest pain or palpitations but does have intermittent shortness 

of breath


-troponin elevated to >2 then trended down, CK trending down, [place]ed at [address]+


-continue aspirin, statin, b-blocker


-will transfer to medical





CHRONIC SYSTOLIC CHF: 


-LVEF reduction, per prior TTE; unknown cause, possibly ischemic cardiomyopathy


-will likely have further w/up - cardiac cath afterwards


-was on ACE-I and intermittent Lasix, these were discontinued in light of above


-patient on isosorbide + hydralazine





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


-uses a cane at baseline


-PT/OT consulted


-did not lose consciousness; fall possibly related to hypovolemia


CT Head negative; additional radiographs negative for fractures





FOUZIA: resolved


-baseline Cr <1


-max was 1.55





HTN:


-bp was low on admission, now is elevated


-held ACE-I, HCTZ due to kidney injury


-continue b-blocker and monitor


-on isosorbide and hydralazine for CHF, hydralazine being titrated up for 

better BP control


Current Inpatient Medications:





Current Inpatient Medications








 Medications


  (Trade)  Dose


 Ordered  Sig/Sirisha


 Route  Start Time


 Stop Time Status Last Admin


Dose Admin


 


 Piperacillin Sod/


 Tazobactam Sod


 3.375 gm/Dextrose  115 ml @ 


 28.75 mls/


 hr  Q8H


 IV  3/17/18 16:00


 3/27/18 15:59  3/22/18 16:11


28.75 MLS/HR


 


 Miscellaneous


 Information


  (Consult)  1 ea  UD  PRN


 N/A  3/17/18 12:30


 4/16/18 12:29   


 


 


 Miscellaneous


 Information


  (Consult)  1 ea  UD  PRN


 N/A  3/17/18 12:30


 4/16/18 12:29   


 


 


 Al Hydrox/Mg


 Hydrox/Simethicone


  (Maalox Max Susp)  15 ml  Q4H  PRN


 PO  3/17/18 13:15


 4/16/18 13:14   


 


 


 Magnesium


 Hydroxide


  (Milk Of


 Magnesia Susp)  30 ml  Q12H  PRN


 PO  3/17/18 13:15


 4/16/18 13:14   


 


 


 Ondansetron HCl


  (Zofran Inj)  4 mg  Q6H  PRN


 IV  3/17/18 13:15


 4/16/18 13:14   


 


 


 Albuterol


  (Ventolin Hfa


 Inhaler)  2 puffs  QID


 INH  3/17/18 17:00


 4/16/18 16:59  3/22/18 20:00


2 PUFFS


 


 Aspirin


  (Ecotrin Tab)  81 mg  DAILY


 PO  3/18/18 09:00


 4/17/18 08:59  3/22/18 07:53


81 MG


 


 Atorvastatin


 Calcium


  (Lipitor Tab)  40 mg  DAILY


 PO  3/18/18 09:00


 4/17/18 08:59 Future Hold 3/19/18 08:24


40 MG


 


 Cetirizine HCl


  (zyrTEC TAB)  10 mg  DAILY  PRN


 PO  3/17/18 13:45


 4/16/18 13:44  3/19/18 20:53


10 MG


 


 Felodipine


  (Plendil Tabcr)  5 mg  QAM


 PO  3/18/18 09:00


 4/17/18 08:59  3/22/18 07:53


5 MG


 


 Sodium Chloride


  (Ocean Nasal


 Spray)  2 sprays  UD  PRN


 NA  3/17/18 13:45


 4/16/18 13:44   


 


 


 Magnesium Oxide


  (Mag-Ox Tab)  200 mg  DAILY


 PO  3/18/18 09:00


 4/17/18 08:59  3/22/18 07:54


200 MG


 


 Pantoprazole


 Sodium


  (Protonix Tab)  40 mg  QAM


 PO  3/18/18 09:00


 4/17/18 08:59  3/22/18 07:53


40 MG


 


 Albuterol/


 Ipratropium


  (Duoneb)  3 ml  QIDR


 INH  3/17/18 16:00


 4/16/18 15:59  3/17/18 19:23


3 ML


 


 Famotidine 20 mg/


 Syringe  5 ml @ 2.5


 mls/min  Q12H


 IV  3/18/18 09:00


 4/17/18 08:59  3/22/18 20:00


2.5 MLS/MIN


 


 Furosemide 20 mg/


 Syringe  2 ml @ 4


 mls/min  DAILY


 IV  3/19/18 09:00


 4/18/18 08:59 Future Hold 3/19/18 08:23


4 MLS/MIN


 


 Lisinopril


  (Zestril Tab)  10 mg  QAM


 PO  3/19/18 09:00


 4/18/18 08:59 Future Hold 3/19/18 08:24


10 MG


 


 Diphenhydramine


 HCl


  (Benadryl Inj)  50 mg  BID


 IV.  3/19/18 21:00


 4/18/18 20:59 Future Hold 3/19/18 20:53


50 MG


 


 Metoprolol


 Succinate


  (Toprol Xl Tab)  25 mg  BID


 PO  3/19/18 21:00


 4/18/18 08:59  3/22/18 20:01


25 MG


 


 Heparin Sodium


  (Porcine)


  (Heparin Sq 5000


 Unit/0.5ml)  5,000 unit  Q8


 SQ  3/19/18 14:00


 4/18/18 13:59  3/22/18 20:01


5,000 UNIT


 


 Gentamicin Sulfate


  (Gentamicin 0.3%


 Oph Oint)  1 appln  4XDQ3H


 OP  3/19/18 13:00


 3/29/18 12:59  3/22/18 16:14


1 APPLN


 


 Acetaminophen


  (Tylenol Tab)  325 mg  Q6H  PRN


 PO  3/20/18 05:00


 4/16/18 13:14   


 


 


 Isosorbide


 Dinitrate


  (Isordil Tab)  5 mg  TID@0700,1200,1700


 PO  3/21/18 12:00


 4/20/18 11:59  3/22/18 16:14


5 MG


 


 Hydralazine HCl


  (Apresoline Tab)  25 mg  TID


 PO  3/22/18 14:00


 4/20/18 13:59  3/22/18 20:00


25 MG

## 2018-03-22 NOTE — CARDIOLOGY FOLLOW-UP
Subjective


General


Date of Service:


Mar 22, 2018.


Chief Complaint:  Facial cellulitis


Pt evaluation today including:  conversation w/ patient, physical exam, chart 

review, lab review, review of studies, review of inpatient medication list





History of Present Illness


Patient seen and examined. Chart, medications, telemetry reviewed. 





Blood pressures remain elevated (off lisinopril, terazosin, and HCTZ).  

Atenolol switch to Toprol-XL this admission. Low dose Nitrates and Hydralazine 

added on 3/21/2018. 





Feel fatigued. Decrease appetite. Denies chest pain, dyspnea, palpitations, 

orthopnea, PND, or lower extremity peripheral edema.





Telemetry: Sinus in the 60-80 bpm with occasional premature atrial complexes 

and rare premature ventricular complexes.





Allergies


Coded Allergies:  


     Latex1 -Allergic Contact Dermititis (Verified  Allergy, Unknown, SLIGHT 

RASH, 3/17/18)


     Doxycycline (Unverified  Adverse Reaction, Unknown, GI UPSET, 3/17/18)


     Meperidine (Unverified  Adverse Reaction, Unknown, GI UPSET, 3/17/18)


     Propoxyphene (Unverified  Adverse Reaction, Unknown, GI UPSET, 3/17/18)


     Sulfa Antibiotics (Unverified  Adverse Reaction, Unknown, GI UPSET, 3/17/18

)





Social History


Smoking Status:  Never Smoker


Hx Tobacco Use In Past Year?:  No


Hx Alcohol Use - Type And Amou:  Yes (1 GLASS WINE/DAY)


Hx Substance Use - Type And Am:  No





Problem List


Medical Problems:


(1) NSTEMI (non-ST elevated myocardial infarction)


Status: Acute  





(2) Periorbital cellulitis


Status: Acute  





(3) Sepsis


Status: Acute  











Review of Systems


Respiratory:  No cough, No wheezing, No shortness of breath, No dyspnea on 

exertion, No dyspnea at rest, No hemoptysis


Cardiac:  No chest pain, No orthopnea, No PND, No edema, No palpitations





Physical Exam


Vital Signs





Last Vital Signs Documentation








  Date Time  Temp Pulse Resp B/P (MAP) Pulse Ox O2 Delivery O2 Flow Rate FiO2


 


3/22/18 08:00      Room Air  


 


3/22/18 07:31 36.4 69 18 162/88 (112) 92   


 


3/20/18 04:00       2.0 











Physical Exam


Constitutional:  


   Level of Distress:  acutely ill


Psychiatric:  


   Mental Status:  active & alert


   Orientation:  to time, to place, to person


   Memory:  recent memory normal, remote memory normal


ENMT:  pertinent finding (improved facial erythema and edema)


Neck:  pertinent finding (Normal JVP)


Lungs:  


   Auscultation:  no wheezing, no rales/crackles, no rhonchi, decreased breath 

sounds


Cardiovascular:  


   Heart Auscultation:  RRR, no murmurs, no rubs, II/VI KRISTINE, gallop


Peripheral Pulses:  


   Dorsalis Pedis Pulse:  normal on the left, normal on the right


Abdomen:  


   Bowel Sounds:  normal


   Inspection & Palpation:  soft, non-distended, no masses


Extremities:  no edema (in the lower extremities), no clubbing


Neurologic:  


   Cranial Nerves:  grossly intact





Assessment and Plan


Assessment and Plan


Admission following a mechanical fall, significant periorbital and facial 

cellulitis, rhabdomyolysis (resolving), acute renal insufficiency


Elevated troponin, asymptomatic in regards to angina symptoms. Suspect 

rhabdomyolysis, infection, acute renal dysfunction


Echocardiography with reduction in left ventricular systolic function, EF 30-35%

.  


Chronic left bundle branch block. 


Compensated systolic congestive heart failure signs and symptoms. 


Hypertension, uncontrolled.


Dyslipidemia. Currently off statin secondary to rhabdomyolysis, abnormal LFT's


Hypokalemia, improved.


 


RECOMMENDATIONS:  


Increase Hydralazine to 25 mg three times per day for additional blood pressure 

control. 


Eventual ischemic evaluation, Lexiscan versus diagnostic cardiac 

catheterization down the road (outpatient). 


Increase activity as tolerated. 





Cardiology Attending Physician:





Patient seen and examined at the bedside.  Facial erythema and edema continue 

to improve.  CPK continues to trend down.  Renal function remains stable.  

Denies chest discomfort.   Offers no other complaints at this time.





PE: VSS. GEN: NAD. AAOx3.  Heart: Regular, normal S1-S2.  No murmur.  Lungs: 

Clear bilateral, no rales, rhonchi, wheeze.  Extremities: No lower extremity 

edema.





A/P: Agree with above PA-C history, physical exam, assessment and plan.  Hold 

ACE inhibitor and statin therapy currently.  Hydralazine will be titrated today 

to improve blood pressure control.  Ischemic evaluation in the future when 

acute infectious process has resolved.  Continue to hold diuretic therapy. 





Solo Gandara DO, [facility]





Laboratory Results





Last 24 Hours








Test


  3/22/18


06:56 3/22/18


08:55


 


Activated Partial


Thromboplast Time 25.5 SECONDS 


  


 


 


Partial Thromboplastin Ratio 1.0  


 


Creatinine 1.15 mg/dl  1.28 mg/dl 


 


Est Creatinine Clear Calc


Drug Dose 38.2 ml/min 


  34.3 ml/min 


 


 


Estimated GFR (


American) 52.8 


  46.4 


 


 


Estimated GFR (Non-


American 45.5 


  40.0 


 


 


White Blood Count  11.35 K/uL 


 


Red Blood Count  4.33 M/uL 


 


Hemoglobin  12.7 g/dL 


 


Hematocrit  37.3 % 


 


Mean Corpuscular Volume  86.1 fL 


 


Mean Corpuscular Hemoglobin  29.3 pg 


 


Mean Corpuscular Hemoglobin


Concent 


  34.0 g/dl 


 


 


RDW Standard Deviation  45.9 fL 


 


RDW Coefficient of Variation  14.5 % 


 


Platelet Count  240 K/uL 


 


Mean Platelet Volume  10.1 fL 


 


Sodium Level  137 mmol/L 


 


Potassium Level  3.6 mmol/L 


 


Chloride Level  104 mmol/L 


 


Carbon Dioxide Level  24 mmol/L 


 


Anion Gap  9.0 mmol/L 


 


Blood Urea Nitrogen  25 mg/dl 


 


BUN/Creatinine Ratio  19.1 


 


Random Glucose  132 mg/dl 


 


Calcium Level  8.8 mg/dl 


 


Total Bilirubin  0.6 mg/dl 


 


Aspartate Amino Transf


(AST/SGOT) 


  355 U/L 


 


 


Alanine Aminotransferase


(ALT/SGPT) 


  193 U/L 


 


 


Alkaline Phosphatase  105 U/L 


 


Total Creatine Kinase  5152 U/L 


 


Total Protein  6.5 gm/dl 


 


Albumin  2.4 gm/dl 


 


Globulin  4.1 gm/dl 


 


Albumin/Globulin Ratio  0.6

## 2018-03-23 VITALS
HEART RATE: 74 BPM | DIASTOLIC BLOOD PRESSURE: 82 MMHG | TEMPERATURE: 97.88 F | OXYGEN SATURATION: 95 % | SYSTOLIC BLOOD PRESSURE: 146 MMHG

## 2018-03-23 VITALS
HEART RATE: 71 BPM | SYSTOLIC BLOOD PRESSURE: 180 MMHG | DIASTOLIC BLOOD PRESSURE: 96 MMHG | TEMPERATURE: 98.24 F | OXYGEN SATURATION: 93 %

## 2018-03-23 VITALS — HEART RATE: 66 BPM | SYSTOLIC BLOOD PRESSURE: 112 MMHG | DIASTOLIC BLOOD PRESSURE: 72 MMHG

## 2018-03-23 VITALS
HEART RATE: 73 BPM | DIASTOLIC BLOOD PRESSURE: 69 MMHG | TEMPERATURE: 97.16 F | OXYGEN SATURATION: 96 % | SYSTOLIC BLOOD PRESSURE: 106 MMHG

## 2018-03-23 VITALS
DIASTOLIC BLOOD PRESSURE: 70 MMHG | OXYGEN SATURATION: 94 % | TEMPERATURE: 98.42 F | HEART RATE: 64 BPM | SYSTOLIC BLOOD PRESSURE: 120 MMHG

## 2018-03-23 VITALS — DIASTOLIC BLOOD PRESSURE: 96 MMHG | SYSTOLIC BLOOD PRESSURE: 162 MMHG | HEART RATE: 76 BPM

## 2018-03-23 VITALS
TEMPERATURE: 98.24 F | HEART RATE: 67 BPM | SYSTOLIC BLOOD PRESSURE: 157 MMHG | DIASTOLIC BLOOD PRESSURE: 88 MMHG | OXYGEN SATURATION: 93 %

## 2018-03-23 VITALS — OXYGEN SATURATION: 93 %

## 2018-03-23 LAB
BUN SERPL-MCNC: 23 MG/DL (ref 7–18)
CALCIUM SERPL-MCNC: 8.7 MG/DL (ref 8.5–10.1)
CO2 SERPL-SCNC: 24 MMOL/L (ref 21–32)
CREAT SERPL-MCNC: 1.26 MG/DL (ref 0.6–1.2)
EOSINOPHIL NFR BLD AUTO: 246 K/UL (ref 130–400)
GLUCOSE SERPL-MCNC: 110 MG/DL (ref 70–99)
HCT VFR BLD CALC: 34.2 % (ref 37–47)
HGB BLD-MCNC: 11.7 G/DL (ref 12–16)
MCH RBC QN AUTO: 29.4 PG (ref 25–34)
MCHC RBC AUTO-ENTMCNC: 34.2 G/DL (ref 32–36)
MCV RBC AUTO: 85.9 FL (ref 80–100)
PMV BLD AUTO: 9.9 FL (ref 7.4–10.4)
POTASSIUM SERPL-SCNC: 3.6 MMOL/L (ref 3.5–5.1)
PTT PATIENT: 25.9 SECONDS (ref 21–31)
RED CELL DISTRIBUTION WIDTH CV: 14.6 % (ref 11.5–14.5)
RED CELL DISTRIBUTION WIDTH SD: 46.1 FL (ref 36.4–46.3)
SODIUM SERPL-SCNC: 137 MMOL/L (ref 136–145)
WBC # BLD AUTO: 10.43 K/UL (ref 4.8–10.8)

## 2018-03-23 RX ADMIN — PIPERACILLIN AND TAZOBACTAM SCH MLS/HR: 3; .375 INJECTION, POWDER, LYOPHILIZED, FOR SOLUTION INTRAVENOUS; PARENTERAL at 17:00

## 2018-03-23 RX ADMIN — Medication SCH MG: at 08:40

## 2018-03-23 RX ADMIN — FAMOTIDINE SCH MLS/MIN: 10 INJECTION INTRAVENOUS at 08:33

## 2018-03-23 RX ADMIN — FAMOTIDINE SCH MLS/MIN: 10 INJECTION INTRAVENOUS at 21:26

## 2018-03-23 RX ADMIN — GENTAMICIN SULFATE SCH APPLN: 3 OINTMENT OPHTHALMIC at 17:01

## 2018-03-23 RX ADMIN — HEPARIN SODIUM SCH UNIT: 10000 INJECTION, SOLUTION INTRAVENOUS; SUBCUTANEOUS at 21:20

## 2018-03-23 RX ADMIN — METOPROLOL SUCCINATE SCH MG: 25 TABLET, EXTENDED RELEASE ORAL at 08:39

## 2018-03-23 RX ADMIN — GENTAMICIN SULFATE SCH APPLN: 3 OINTMENT OPHTHALMIC at 10:48

## 2018-03-23 RX ADMIN — ALBUTEROL SULFATE SCH PUFFS: 90 AEROSOL, METERED RESPIRATORY (INHALATION) at 08:28

## 2018-03-23 RX ADMIN — ISOSORBIDE DINITRATE SCH MG: 5 TABLET ORAL at 13:24

## 2018-03-23 RX ADMIN — ISOSORBIDE DINITRATE SCH MG: 5 TABLET ORAL at 06:26

## 2018-03-23 RX ADMIN — ACETAMINOPHEN PRN MG: 325 TABLET ORAL at 09:17

## 2018-03-23 RX ADMIN — ISOSORBIDE DINITRATE SCH MG: 5 TABLET ORAL at 08:40

## 2018-03-23 RX ADMIN — ISOSORBIDE DINITRATE SCH MG: 5 TABLET ORAL at 17:02

## 2018-03-23 RX ADMIN — HEPARIN SODIUM SCH UNIT: 10000 INJECTION, SOLUTION INTRAVENOUS; SUBCUTANEOUS at 06:29

## 2018-03-23 RX ADMIN — FELODIPINE SCH MG: 5 TABLET, FILM COATED, EXTENDED RELEASE ORAL at 08:41

## 2018-03-23 RX ADMIN — GENTAMICIN SULFATE SCH APPLN: 3 OINTMENT OPHTHALMIC at 08:27

## 2018-03-23 RX ADMIN — Medication SCH MG: at 08:42

## 2018-03-23 RX ADMIN — ALBUTEROL SULFATE SCH PUFFS: 90 AEROSOL, METERED RESPIRATORY (INHALATION) at 17:01

## 2018-03-23 RX ADMIN — PIPERACILLIN AND TAZOBACTAM SCH MLS/HR: 3; .375 INJECTION, POWDER, LYOPHILIZED, FOR SOLUTION INTRAVENOUS; PARENTERAL at 08:33

## 2018-03-23 RX ADMIN — GENTAMICIN SULFATE SCH APPLN: 3 OINTMENT OPHTHALMIC at 13:25

## 2018-03-23 RX ADMIN — HEPARIN SODIUM SCH UNIT: 10000 INJECTION, SOLUTION INTRAVENOUS; SUBCUTANEOUS at 13:27

## 2018-03-23 RX ADMIN — METOPROLOL SUCCINATE SCH MG: 25 TABLET, EXTENDED RELEASE ORAL at 21:22

## 2018-03-23 RX ADMIN — ALBUTEROL SULFATE SCH PUFFS: 90 AEROSOL, METERED RESPIRATORY (INHALATION) at 21:21

## 2018-03-23 RX ADMIN — ALBUTEROL SULFATE SCH PUFFS: 90 AEROSOL, METERED RESPIRATORY (INHALATION) at 12:38

## 2018-03-23 RX ADMIN — PANTOPRAZOLE SCH MG: 40 TABLET, DELAYED RELEASE ORAL at 08:41

## 2018-03-23 NOTE — CLINICAL DOCUMENTATION QUERY
Dr. RODGERS,ADELA :



**** CLINICAL DOCUMENTATION QUERIES****



QUERY 1 OF 2

Documentation includes:  "ELEVATED TROPONIN: probable Type 2 event in the setting of 
Sepsis".  As appropriate, consider documentation as suggested below as this cannot be 
assumed by the professional .  Thank you. 



In your clinical opinion is this patient being managed for:

    

                        ( x ) Myocardial infarction type 2

                        (  ) Not Agree



                        (  ) Other explanation of clinical findings (Please Explain)

                        (  ) Unable to determine (Please Define)

                        (  ) Need to Discuss

                        



The medical record reflects the following clinical findings, treatment, and risk factors.  
  



Clinical Indicators: As above

Treatment: IV heparin, cardiology consult, ASA, statin, beta blocker

Risk Factors: Age, sepsis, LVEF 35%, CAD, hypertension, dyslipidemia



QUERY 2 OF 2

Documentation includes: "CHRONIC SYSTOLIC CHF".  However, 3/18, patient was administered 
IV Lasix in the setting of aggressive IV hydration on admission.  Cardiology noted this as 
"acute decompensated systolic heart failure secondary to aggressive

hydration."  Subsequent to this, documentation has included "Compensated systolic 
congestive heart failure signs and symptoms".  This may create concern over the presence 
of this condition for the  at time of discharge.  Please clarify as suggested below 
in order to avoid uncertainty at time of discharge.  Thank you. 



In your clinical opinion is this patient being managed for:

    

                        ( x ) Acute systolic CHF, resolved

                        (  ) Not Agree



                        (  ) Other explanation of clinical findings (Please Explain)

                        (  ) Unable to determine (Please Define)

                        (  ) Need to Discuss

                        



The medical record reflects the following clinical findings, treatment, and risk factors.  
  



Clinical Indicators: As above

Treatment: IV Lasix, lisinopril, discontinuation of IV fluids, chemistries

Risk Factors: Aggressive IVF administration.



Please clarify and document your clinical opinion in the progress notes and discharge 
summary. Terms such as "probable", "suspected", "likely", "questionable", "possible", or 
"still to be ruled out" are acceptable. 



Please clarify and document your clinical opinion in the progress notes and discharge 
summary. Terms such as "probable", "suspected", "likely", "questionable", "possible", or 
"still to be ruled out" are acceptable. 



*****IF IN AGREEMENT, YOU MUST DOCUMENT ABOVE DIAGNOSTIC STATEMENT IN DAILY PROGRESS NOTES 
AND DISCHARGE SUMMARY. This document is not part of the patient's record.*****

Thank You, Devin Hobson [phone]

## 2018-03-23 NOTE — PHARMACY PROGRESS NOTE
Pharmacy Abx Dose Short Note


Date of Service


Mar 23, 2018.





Assessment & Plan


Assessment








77 year old female receiving Vancomycin and Zosyn IV for treatment of facial/

periorbital cellulitis


* Day # 7 of antimicrobial therapy


* All cultures reported no growth to date


* Vancomycin was placed on hold yesterday due to trough level of 24.3 mcg/mL (

not drawn at steady state)





Plan





Vancomycin


* Random level this am resulted at 25.6 mcg/mL. This is supratherapeutic.


* Last dose of vancomycin was administered 3/22 @ 1204. Estimated half life ~

18.7 hours. 


* Based on half life, vanco level should be less than 18 mcg/mL at 1600 today, 

therefore I will resume vanco then.


* Change dose to 1250 mg IV q24 hour.


* Goal trough level : 15 to 20 mcg/mL


* No further levels ordered at this time d/t possible discontinuation of vanco (

see ID note) and uncertainty of renal function. If Scr returns to baseline, q24 

hour dosing will likely be insufficient.





Pharmacy will continue to follow and will adjust dose/frequency as necessary.  

Thank you.

## 2018-03-23 NOTE — PROGRESS NOTE
Medicine Progress Note


Date & Time of Visit:


Mar 23, 2018 at 22:52.


Subjective


Patient reports feeling better overall, denies any new complaints. Able to open 

eyes wider and focus easier. No overnight events noted. Denies any CP or SOB.





Objective





Last 8 Hrs








  Date Time  Temp Pulse Resp B/P (MAP) Pulse Ox O2 Delivery O2 Flow Rate FiO2


 


3/23/18 17:00  66 20 112/72 (85)    


 


3/23/18 15:31 36.2 73 20 106/69 (81) 96 Room Air  


 


3/23/18 15:02      Room Air  








Physical Exam:


GENERAL: Patient is in no acute distress.


HEENT: No acute trauma, normocephalic, mucous membranes moist, no nasal 

congestion, no scleral icterus. B/L periorbital erythema and edema, L>R, scant 

left eye drainage


NECK: No stridor, trachea is midline.


LUNGS: Clear to auscultation bilaterally, no wheeze, no rhonchi, breath sounds 

equal.


HEART: Without murmurs gallops or rubs, regular rate and rhythm.


ABDOMEN: Soft, nontender, bowel sounds positive


EXTREMITIES: No cyanosis or edema


NEUROLOGIC: Oriented x 3, no acute motor or sensory deficits, no focal weakness.


SKIN: No rash, no jaundice, no diaphoresis.


Laboratory Results:





Last 24 Hours








Test


  3/23/18


07:04


 


White Blood Count 10.43 K/uL 


 


Red Blood Count 3.98 M/uL 


 


Hemoglobin 11.7 g/dL 


 


Hematocrit 34.2 % 


 


Mean Corpuscular Volume 85.9 fL 


 


Mean Corpuscular Hemoglobin 29.4 pg 


 


Mean Corpuscular Hemoglobin


Concent 34.2 g/dl 


 


 


RDW Standard Deviation 46.1 fL 


 


RDW Coefficient of Variation 14.6 % 


 


Platelet Count 246 K/uL 


 


Mean Platelet Volume 9.9 fL 


 


Activated Partial


Thromboplast Time 25.9 SECONDS 


 


 


Partial Thromboplastin Ratio 1.0 


 


Sodium Level 137 mmol/L 


 


Potassium Level 3.6 mmol/L 


 


Chloride Level 104 mmol/L 


 


Carbon Dioxide Level 24 mmol/L 


 


Anion Gap 9.0 mmol/L 


 


Blood Urea Nitrogen 23 mg/dl 


 


Creatinine 1.26 mg/dl 


 


Est Creatinine Clear Calc


Drug Dose 34.8 ml/min 


 


 


Estimated GFR (


American) 47.3 


 


 


Estimated GFR (Non-


American 40.8 


 


 


BUN/Creatinine Ratio 18.0 


 


Random Glucose 110 mg/dl 


 


Calcium Level 8.7 mg/dl 


 


Random Vancomycin Level 25.6 mcg/ml 











Assessment & Plan


PRESEPTAL CELLULITIS: sepsis resolved


-having slow improvement of the preseptal cellulitis


-continued on IV Vanco and Zosyn


-ID consulted, appreciate recs, likely switch to PO from IV in 1 day


-Facial/plastic surgery consulted, appreciate recs; gentamicin ophthalmic 

ointment ordered - warm compresses


-CT Facial and soft tissue neck: showing Mild nonspecific cellulitis of the 

right perimandibular and submandibular region.


-no abscess noted on CT


-on presentation patient had significant leukocytosis, lactic acidosis, low 

grade fever


-blood cultures negative





ELEVATED TROPONIN: probable Type 2 event in the setting of Sepsis 


-known hx of CAD with medical management


-IV heparin stopped


-continue aspirin, statin, b-blocker


-Cardiology consulted, appreciate recs


-TTE: Report:


* -- Conclusions --


* Left ventricular systolic function is moderately reduced.


* Ejection Fraction = 30-35%.


* Septal motion is consistent with conduction abnormality.


* Otherwise moderate diffuse hypokinesis.


* The left atrium is moderately dilated.


* Aortic valve sclerosis mild, without significant aortic valvular stenosis.


* There is moderate to severe mitral annular calcification.


* There is mild mitral regurgitation.


-per outpatient Cardiology notes; patient has been having dyspnea on exertion


-no recent stress test 


-no symptoms of chest pain or palpitations but does have intermittent shortness 

of breath


-troponin elevated to >2 then trended down, CK trending down, maxed at [address]+


-continue aspirin, statin, b-blocker


-transfer to medical





CHRONIC SYSTOLIC CHF: 


-LVEF reduction, per prior TTE; unknown cause, possibly ischemic cardiomyopathy


-will likely have further w/up - cardiac cath afterwards


-was on ACE-I and intermittent Lasix, these were discontinued in light of above


-patient started on isosorbide + hydralazine





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


-uses a cane at baseline


-PT/OT consulted


-did not lose consciousness; fall possibly related to hypovolemia


CT Head negative; additional radiographs negative for fractures





FOUZIA: resolved


-baseline Cr <1


-max was 1.55





HTN:


-bp was low on admission, now is elevated


-held ACE-I, HCTZ due to kidney injury


-continue b-blocker and monitor


-on isosorbide and hydralazine for CHF, hydralazine being titrated up for 

better BP control


Current Inpatient Medications:





Current Inpatient Medications








 Medications


  (Trade)  Dose


 Ordered  Sig/Sirisha


 Route  Start Time


 Stop Time Status Last Admin


Dose Admin


 


 Piperacillin Sod/


 Tazobactam Sod


 3.375 gm/Dextrose  115 ml @ 


 28.75 mls/


 hr  Q8H


 IV  3/17/18 16:00


 3/27/18 15:59  3/23/18 17:00


28.75 MLS/HR


 


 Miscellaneous


 Information


  (Consult)  1 ea  UD  PRN


 N/A  3/17/18 12:30


 4/16/18 12:29   


 


 


 Miscellaneous


 Information


  (Consult)  1 ea  UD  PRN


 N/A  3/17/18 12:30


 4/16/18 12:29   


 


 


 Al Hydrox/Mg


 Hydrox/Simethicone


  (Maalox Max Susp)  15 ml  Q4H  PRN


 PO  3/17/18 13:15


 4/16/18 13:14   


 


 


 Magnesium


 Hydroxide


  (Milk Of


 Magnesia Susp)  30 ml  Q12H  PRN


 PO  3/17/18 13:15


 4/16/18 13:14   


 


 


 Ondansetron HCl


  (Zofran Inj)  4 mg  Q6H  PRN


 IV  3/17/18 13:15


 4/16/18 13:14   


 


 


 Albuterol


  (Ventolin Hfa


 Inhaler)  2 puffs  QID


 INH  3/17/18 17:00


 4/16/18 16:59  3/23/18 21:21


2 PUFFS


 


 Aspirin


  (Ecotrin Tab)  81 mg  DAILY


 PO  3/18/18 09:00


 4/17/18 08:59  3/23/18 08:42


81 MG


 


 Atorvastatin


 Calcium


  (Lipitor Tab)  40 mg  DAILY


 PO  3/18/18 09:00


 4/17/18 08:59 Future Hold 3/19/18 08:24


40 MG


 


 Cetirizine HCl


  (zyrTEC TAB)  10 mg  DAILY  PRN


 PO  3/17/18 13:45


 4/16/18 13:44  3/19/18 20:53


10 MG


 


 Felodipine


  (Plendil Tabcr)  5 mg  QAM


 PO  3/18/18 09:00


 4/17/18 08:59  3/23/18 08:41


5 MG


 


 Sodium Chloride


  (Ocean Nasal


 Spray)  2 sprays  UD  PRN


 NA  3/17/18 13:45


 4/16/18 13:44   


 


 


 Magnesium Oxide


  (Mag-Ox Tab)  200 mg  DAILY


 PO  3/18/18 09:00


 4/17/18 08:59  3/23/18 08:40


200 MG


 


 Pantoprazole


 Sodium


  (Protonix Tab)  40 mg  QAM


 PO  3/18/18 09:00


 4/17/18 08:59  3/23/18 08:41


40 MG


 


 Albuterol/


 Ipratropium


  (Duoneb)  3 ml  QIDR


 INH  3/17/18 16:00


 4/16/18 15:59  3/17/18 19:23


3 ML


 


 Famotidine 20 mg/


 Syringe  5 ml @ 2.5


 mls/min  Q12H


 IV  3/18/18 09:00


 4/17/18 08:59  3/23/18 21:26


2.5 MLS/MIN


 


 Furosemide 20 mg/


 Syringe  2 ml @ 4


 mls/min  DAILY


 IV  3/19/18 09:00


 4/18/18 08:59 Future Hold 3/19/18 08:23


4 MLS/MIN


 


 Lisinopril


  (Zestril Tab)  10 mg  QAM


 PO  3/19/18 09:00


 4/18/18 08:59 Future Hold 3/19/18 08:24


10 MG


 


 Diphenhydramine


 HCl


  (Benadryl Inj)  50 mg  BID


 IV.  3/19/18 21:00


 4/18/18 20:59 Future Hold 3/19/18 20:53


50 MG


 


 Metoprolol


 Succinate


  (Toprol Xl Tab)  25 mg  BID


 PO  3/19/18 21:00


 4/18/18 08:59  3/23/18 21:22


25 MG


 


 Heparin Sodium


  (Porcine)


  (Heparin Sq 5000


 Unit/0.5ml)  5,000 unit  Q8


 SQ  3/19/18 14:00


 4/18/18 13:59  3/23/18 13:27


5,000 UNIT


 


 Gentamicin Sulfate


  (Gentamicin 0.3%


 Oph Oint)  1 appln  4XDQ3H


 OP  3/19/18 13:00


 3/29/18 12:59  3/23/18 17:01


1 APPLN


 


 Acetaminophen


  (Tylenol Tab)  325 mg  Q6H  PRN


 PO  3/20/18 05:00


 4/16/18 13:14  3/23/18 09:17


325 MG


 


 Vancomycin HCl


 1250 mg/Sodium


 Chloride  275 ml @ 


 125 mls/hr  Q24H


 IV  3/23/18 16:00


 3/27/18 10:59  3/23/18 17:00


125 MLS/HR


 


 Hydralazine HCl


  (Apresoline Tab)  25 mg  QID


 PO  3/23/18 12:00


 4/20/18 13:59  3/23/18 21:20


25 MG


 


 Isosorbide


 Dinitrate


  (Isordil Tab)  10 mg  TID@0700,1200,1700


 PO  3/23/18 12:00


 4/20/18 11:59  3/23/18 17:02


10 MG


 


 Terazosin HCl


  (Hytrin Cap)  2 mg  HS


 PO  3/23/18 21:00


 4/22/18 20:59  3/23/18 21:20


2 MG

## 2018-03-23 NOTE — CARDIOLOGY FOLLOW-UP
Subjective


General


Date of Service:


Mar 23, 2018.


Chief Complaint:  Facial cellulitis


Pt evaluation today including:  conversation w/ patient, physical exam, chart 

review, lab review, review of studies, review of inpatient medication list





History of Present Illness


Patient seen and examined.  No complaints.  Feeling some better.  Telemetry 

prior to moving to a nonmonitored bed revealed sinus in the 60s-70s.  Blood 

pressure remains elevated. No chest pain, palpitations, worsening shortness of 

breath, headaches, dizziness, near-syncope, or fluid retention.





Allergies


Coded Allergies:  


     Latex1 -Allergic Contact Dermititis (Verified  Allergy, Unknown, SLIGHT 

RASH, 3/17/18)


     Doxycycline (Unverified  Adverse Reaction, Unknown, GI UPSET, 3/17/18)


     Meperidine (Unverified  Adverse Reaction, Unknown, GI UPSET, 3/17/18)


     Propoxyphene (Unverified  Adverse Reaction, Unknown, GI UPSET, 3/17/18)


     Sulfa Antibiotics (Unverified  Adverse Reaction, Unknown, GI UPSET, 3/17/18

)





Social History


Smoking Status:  Never Smoker


Hx Tobacco Use In Past Year?:  No


Hx Alcohol Use - Type And Amou:  Yes (1 GLASS WINE/DAY)


Hx Substance Use - Type And Am:  No





Problem List


Medical Problems:


(1) NSTEMI (non-ST elevated myocardial infarction)


Status: Acute  





(2) Periorbital cellulitis


Status: Acute  





(3) Sepsis


Status: Acute  











Review of Systems


Respiratory:  + dyspnea on exertion, No cough, No sputum, No wheezing, No 

shortness of breath, No dyspnea at rest, No hemoptysis


Cardiac:  No chest pain, No orthopnea, No PND, No edema, No claudication, No 

palpitations





Physical Exam


Vital Signs





Last Vital Signs Documentation








  Date Time  Temp Pulse Resp B/P (MAP) Pulse Ox O2 Delivery O2 Flow Rate FiO2


 


3/23/18 09:07 36.6 74 16 146/82 (103) 95   


 


3/23/18 07:35      Room Air  


 


3/20/18 04:00       2.0 











Physical Exam


Constitutional:  


   Level of Distress:  acutely ill


Psychiatric:  


   Mental Status:  active & alert


   Orientation:  to time, to place, to person


   Memory:  recent memory normal, remote memory normal


ENMT:  pertinent finding (improved facial erythema and edema)


Neck:  pertinent finding (Normal JVP)


Lungs:  


   Auscultation:  no wheezing, no rales/crackles, no rhonchi, decreased breath 

sounds


Cardiovascular:  


   Heart Auscultation:  RRR, no murmurs, no rubs, II/VI KRISTINE, gallop


Peripheral Pulses:  


   Dorsalis Pedis Pulse:  normal on the left, normal on the right


Abdomen:  


   Bowel Sounds:  normal


   Inspection & Palpation:  soft, non-distended, no masses


Extremities:  no edema (in the lower extremities), no clubbing


Neurologic:  


   Cranial Nerves:  grossly intact





Assessment and Plan


Assessment and Plan


Admission following a mechanical fall, significant periorbital and facial 

cellulitis, rhabdomyolysis (resolving), renal dysfunction


Elevated troponin, asymptomatic in regards to angina symptoms. Suspect 

rhabdomyolysis, infection, acute renal dysfunction


Echocardiography with reduction in left ventricular systolic function, EF 30-35%

.  


Chronic left bundle branch block. 


Compensated systolic congestive heart failure signs and symptoms. 


Hypertension, uncontrolled.


Dyslipidemia. Currently off statin secondary to rhabdomyolysis, abnormal LFT's


Hypokalemia, improved.


 


RECOMMENDATIONS:  Increase nitrates and hydralazine.  Resume terazosin QHS. 

Outpatient resting echocardiography to reassess LV systolic function then 

consider need for Lexiscan nuclear stress testing versus diagnostic cardiac 

catheterization. Increase activity as tolerated.  Outpatient cardiology follow-

up. 





Cardiology Attending Physician:





Patient seen and examined at the bedside.  Facial erythema and edema continue 

to improve.  Patient is now able to see out of her left eye.  CPK continues to 

trend down.  Renal function remains stable.  Denies chest discomfort.   Offers 

no other complaints at this time.





PE: VSS. GEN: NAD. AAOx3.  Heart: Regular, normal S1-S2.  No murmur.  Lungs: 

Clear bilateral, no rales, rhonchi, wheeze.  Extremities: No lower extremity 

edema.





A/P: Agree with above PA-C history, physical exam, assessment and plan.  ACE 

inhibitor on hold due to possible angioedema.  Plan outpatient ischemic 

evaluation.  Nitrates and hydralazine will be titrated today.  Hytrin 

restarted.  Cardiology will sign off for the weekend.  Please call with 

questions.





Solo Gandara DO, [facility]





Laboratory Results





Last 24 Hours








Test


  3/22/18


11:51 3/23/18


07:04


 


Vancomycin Level Trough 24.3 mcg/ml  


 


White Blood Count  10.43 K/uL 


 


Red Blood Count  3.98 M/uL 


 


Hemoglobin  11.7 g/dL 


 


Hematocrit  34.2 % 


 


Mean Corpuscular Volume  85.9 fL 


 


Mean Corpuscular Hemoglobin  29.4 pg 


 


Mean Corpuscular Hemoglobin


Concent 


  34.2 g/dl 


 


 


RDW Standard Deviation  46.1 fL 


 


RDW Coefficient of Variation  14.6 % 


 


Platelet Count  246 K/uL 


 


Mean Platelet Volume  9.9 fL 


 


Activated Partial


Thromboplast Time 


  25.9 SECONDS 


 


 


Partial Thromboplastin Ratio  1.0 


 


Sodium Level  137 mmol/L 


 


Potassium Level  3.6 mmol/L 


 


Chloride Level  104 mmol/L 


 


Carbon Dioxide Level  24 mmol/L 


 


Anion Gap  9.0 mmol/L 


 


Blood Urea Nitrogen  23 mg/dl 


 


Creatinine  1.26 mg/dl 


 


Est Creatinine Clear Calc


Drug Dose 


  34.8 ml/min 


 


 


Estimated GFR (


American) 


  47.3 


 


 


Estimated GFR (Non-


American 


  40.8 


 


 


BUN/Creatinine Ratio  18.0 


 


Random Glucose  110 mg/dl 


 


Calcium Level  8.7 mg/dl 


 


Random Vancomycin Level  25.6 mcg/ml

## 2018-03-24 VITALS
HEART RATE: 72 BPM | TEMPERATURE: 97.88 F | SYSTOLIC BLOOD PRESSURE: 106 MMHG | OXYGEN SATURATION: 91 % | DIASTOLIC BLOOD PRESSURE: 64 MMHG

## 2018-03-24 VITALS — OXYGEN SATURATION: 93 %

## 2018-03-24 VITALS
DIASTOLIC BLOOD PRESSURE: 69 MMHG | OXYGEN SATURATION: 94 % | TEMPERATURE: 97.88 F | HEART RATE: 96 BPM | SYSTOLIC BLOOD PRESSURE: 110 MMHG

## 2018-03-24 VITALS — SYSTOLIC BLOOD PRESSURE: 129 MMHG | DIASTOLIC BLOOD PRESSURE: 74 MMHG | HEART RATE: 72 BPM

## 2018-03-24 VITALS
TEMPERATURE: 97.7 F | OXYGEN SATURATION: 94 % | HEART RATE: 67 BPM | DIASTOLIC BLOOD PRESSURE: 67 MMHG | SYSTOLIC BLOOD PRESSURE: 106 MMHG

## 2018-03-24 VITALS — SYSTOLIC BLOOD PRESSURE: 122 MMHG | DIASTOLIC BLOOD PRESSURE: 64 MMHG | HEART RATE: 90 BPM

## 2018-03-24 VITALS — SYSTOLIC BLOOD PRESSURE: 130 MMHG | HEART RATE: 66 BPM | DIASTOLIC BLOOD PRESSURE: 74 MMHG

## 2018-03-24 LAB
BUN SERPL-MCNC: 25 MG/DL (ref 7–18)
CALCIUM SERPL-MCNC: 8.5 MG/DL (ref 8.5–10.1)
CO2 SERPL-SCNC: 25 MMOL/L (ref 21–32)
CREAT SERPL-MCNC: 1.24 MG/DL (ref 0.6–1.2)
GLUCOSE SERPL-MCNC: 103 MG/DL (ref 70–99)
POTASSIUM SERPL-SCNC: 3.4 MMOL/L (ref 3.5–5.1)
PTT PATIENT: 26.4 SECONDS (ref 21–31)
SODIUM SERPL-SCNC: 137 MMOL/L (ref 136–145)

## 2018-03-24 RX ADMIN — PIPERACILLIN AND TAZOBACTAM SCH MLS/HR: 3; .375 INJECTION, POWDER, LYOPHILIZED, FOR SOLUTION INTRAVENOUS; PARENTERAL at 07:48

## 2018-03-24 RX ADMIN — ISOSORBIDE DINITRATE SCH MG: 5 TABLET ORAL at 16:56

## 2018-03-24 RX ADMIN — ISOSORBIDE DINITRATE SCH MG: 5 TABLET ORAL at 06:02

## 2018-03-24 RX ADMIN — ALBUTEROL SULFATE SCH PUFFS: 90 AEROSOL, METERED RESPIRATORY (INHALATION) at 12:31

## 2018-03-24 RX ADMIN — METOPROLOL SUCCINATE SCH MG: 25 TABLET, EXTENDED RELEASE ORAL at 07:49

## 2018-03-24 RX ADMIN — HEPARIN SODIUM SCH UNIT: 10000 INJECTION, SOLUTION INTRAVENOUS; SUBCUTANEOUS at 06:02

## 2018-03-24 RX ADMIN — CETIRIZINE HYDROCHLORIDE PRN MG: 10 TABLET, FILM COATED ORAL at 20:25

## 2018-03-24 RX ADMIN — FELODIPINE SCH MG: 5 TABLET, FILM COATED, EXTENDED RELEASE ORAL at 07:50

## 2018-03-24 RX ADMIN — GENTAMICIN SULFATE SCH APPLN: 3 OINTMENT OPHTHALMIC at 10:15

## 2018-03-24 RX ADMIN — PIPERACILLIN AND TAZOBACTAM SCH MLS/HR: 3; .375 INJECTION, POWDER, LYOPHILIZED, FOR SOLUTION INTRAVENOUS; PARENTERAL at 00:14

## 2018-03-24 RX ADMIN — FAMOTIDINE SCH MLS/MIN: 10 INJECTION INTRAVENOUS at 20:30

## 2018-03-24 RX ADMIN — GENTAMICIN SULFATE SCH APPLN: 3 OINTMENT OPHTHALMIC at 06:01

## 2018-03-24 RX ADMIN — HEPARIN SODIUM SCH UNIT: 10000 INJECTION, SOLUTION INTRAVENOUS; SUBCUTANEOUS at 14:02

## 2018-03-24 RX ADMIN — AMOXICILLIN AND CLAVULANATE POTASSIUM SCH MG: 875; 125 TABLET, FILM COATED ORAL at 21:38

## 2018-03-24 RX ADMIN — GENTAMICIN SULFATE SCH APPLN: 3 OINTMENT OPHTHALMIC at 16:58

## 2018-03-24 RX ADMIN — ALBUTEROL SULFATE SCH PUFFS: 90 AEROSOL, METERED RESPIRATORY (INHALATION) at 07:48

## 2018-03-24 RX ADMIN — HEPARIN SODIUM SCH UNIT: 10000 INJECTION, SOLUTION INTRAVENOUS; SUBCUTANEOUS at 20:22

## 2018-03-24 RX ADMIN — METOPROLOL SUCCINATE SCH MG: 25 TABLET, EXTENDED RELEASE ORAL at 20:17

## 2018-03-24 RX ADMIN — Medication SCH MG: at 07:50

## 2018-03-24 RX ADMIN — ISOSORBIDE DINITRATE SCH MG: 5 TABLET ORAL at 12:30

## 2018-03-24 RX ADMIN — Medication SCH MG: at 07:49

## 2018-03-24 RX ADMIN — FAMOTIDINE SCH MLS/MIN: 10 INJECTION INTRAVENOUS at 08:44

## 2018-03-24 RX ADMIN — ALBUTEROL SULFATE SCH PUFFS: 90 AEROSOL, METERED RESPIRATORY (INHALATION) at 20:22

## 2018-03-24 RX ADMIN — PANTOPRAZOLE SCH MG: 40 TABLET, DELAYED RELEASE ORAL at 07:49

## 2018-03-24 RX ADMIN — GENTAMICIN SULFATE SCH APPLN: 3 OINTMENT OPHTHALMIC at 14:01

## 2018-03-24 RX ADMIN — ALBUTEROL SULFATE SCH PUFFS: 90 AEROSOL, METERED RESPIRATORY (INHALATION) at 16:58

## 2018-03-24 NOTE — PROGRESS NOTE
Medicine Progress Note


Date & Time of Visit:


Mar 24, 2018 at 21:18.


Subjective


Patient doing ok, complains of a new rash around her axillae, back, groin, 

lower abdomen, and legs that she just noticed this AM. She denies the rash 

being pruritic or painful. No other complaints noted. Denies any CP or SOB. 

Eyelid swelling is improving.





Objective





Last 8 Hrs








  Date Time  Temp Pulse Resp B/P (MAP) Pulse Ox O2 Delivery O2 Flow Rate FiO2


 


3/24/18 20:15  90  122/64 (83)    


 


3/24/18 16:00     93 Room Air  


 


3/24/18 15:40 36.6 72 18 106/64 (78) 91   








Physical Exam:


GENERAL: Patient is in no acute distress.


HEENT: No acute trauma, normocephalic, mucous membranes moist, no nasal 

congestion, no scleral icterus. B/L periorbital erythema and edema, L>R, scant 

left eye drainage


NECK: No stridor, trachea is midline.


LUNGS: Clear to auscultation bilaterally, no wheeze, no rhonchi, breath sounds 

equal.


HEART: Without murmurs gallops or rubs, regular rate and rhythm.


ABDOMEN: Soft, nontender, bowel sounds positive


EXTREMITIES: No cyanosis or edema


NEUROLOGIC: Oriented x 3, no acute motor or sensory deficits, no focal weakness.


SKIN: No jaundice, no diaphoresis. Diffuse erythematous maculo-papular rash 

noted in B/L groins, axilla, back, thighs and sides; blanches; non tender, no 

open lesions


Laboratory Results:





Last 24 Hours








Test


  3/24/18


07:18


 


Activated Partial


Thromboplast Time 26.4 SECONDS 


 


 


Partial Thromboplastin Ratio 1.0 


 


Sodium Level 137 mmol/L 


 


Potassium Level 3.4 mmol/L 


 


Chloride Level 104 mmol/L 


 


Carbon Dioxide Level 25 mmol/L 


 


Anion Gap 8.0 mmol/L 


 


Blood Urea Nitrogen 25 mg/dl 


 


Creatinine 1.24 mg/dl 


 


Est Creatinine Clear Calc


Drug Dose 35.4 ml/min 


 


 


Estimated GFR (


American) 48.2 


 


 


Estimated GFR (Non-


American 41.6 


 


 


BUN/Creatinine Ratio 19.8 


 


Random Glucose 103 mg/dl 


 


Calcium Level 8.5 mg/dl 











Assessment & Plan


PRESEPTAL CELLULITIS: sepsis resolved


-having slow improvement of the preseptal cellulitis


-continued on IV Vanco and Zosyn


-ID consulted, appreciate recs, will switch to Augmentin today as discussed


-Facial/plastic surgery consulted, appreciate recs; gentamicin ophthalmic 

ointment ordered - warm compresses


-CT Facial and soft tissue neck: showing Mild nonspecific cellulitis of the 

right perimandibular and submandibular region.


-no abscess noted on CT


-on presentation patient had significant leukocytosis, lactic acidosis, low 

grade fever


-blood cultures negative





ERYTHEMATOUS RASH:


-new rash, highly suspicious for drug eruption; will stop the zosyn and vanco 

and if no change in the rash would consider hydralazine as the next agent to 

stop


-nontender, nonpruritic 





ELEVATED TROPONIN: probable Type 2 MI in the setting of Sepsis 


-known hx of CAD with medical management


-IV heparin stopped


-continue aspirin, statin, b-blocker


-Cardiology consulted, appreciate recs


-TTE: Report:


* -- Conclusions --


* Left ventricular systolic function is moderately reduced.


* Ejection Fraction = 30-35%.


* Septal motion is consistent with conduction abnormality.


* Otherwise moderate diffuse hypokinesis.


* The left atrium is moderately dilated.


* Aortic valve sclerosis mild, without significant aortic valvular stenosis.


* There is moderate to severe mitral annular calcification.


* There is mild mitral regurgitation.


-per outpatient Cardiology notes; patient has been having dyspnea on exertion


-no recent stress test 


-no symptoms of chest pain or palpitations but does have intermittent shortness 

of breath


-troponin elevated to >2 then trended down, CK trending down, maxed at [address]+


-continue aspirin, statin, b-blocker


-transfer to medical





CHRONIC SYSTOLIC CHF: 


-LVEF reduction, per prior TTE; unknown cause, possibly ischemic cardiomyopathy


-will likely have further w/up - cardiac cath afterwards


-was on ACE-I and intermittent Lasix, these were discontinued in light of above


-patient started on isosorbide + hydralazine





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


-uses a cane at baseline


-PT/OT consulted


-did not lose consciousness; fall possibly related to hypovolemia


CT Head negative; additional radiographs negative for fractures





FOUZIA: resolved


-baseline Cr <1


-max was 1.55





HTN:


-bp was low on admission, now is elevated


-held ACE-I, HCTZ due to kidney injury


-continue b-blocker and monitor


-on isosorbide and hydralazine for CHF, hydralazine being titrated up for 

better BP control


Current Inpatient Medications:





Current Inpatient Medications








 Medications


  (Trade)  Dose


 Ordered  Sig/Sirisha


 Route  Start Time


 Stop Time Status Last Admin


Dose Admin


 


 Al Hydrox/Mg


 Hydrox/Simethicone


  (Maalox Max Susp)  15 ml  Q4H  PRN


 PO  3/17/18 13:15


 4/16/18 13:14   


 


 


 Magnesium


 Hydroxide


  (Milk Of


 Magnesia Susp)  30 ml  Q12H  PRN


 PO  3/17/18 13:15


 4/16/18 13:14   


 


 


 Ondansetron HCl


  (Zofran Inj)  4 mg  Q6H  PRN


 IV  3/17/18 13:15


 4/16/18 13:14   


 


 


 Albuterol


  (Ventolin Hfa


 Inhaler)  2 puffs  QID


 INH  3/17/18 17:00


 4/16/18 16:59  3/24/18 20:22


2 PUFFS


 


 Aspirin


  (Ecotrin Tab)  81 mg  DAILY


 PO  3/18/18 09:00


 4/17/18 08:59  3/24/18 07:49


81 MG


 


 Atorvastatin


 Calcium


  (Lipitor Tab)  40 mg  DAILY


 PO  3/18/18 09:00


 4/17/18 08:59 Future Hold 3/19/18 08:24


40 MG


 


 Cetirizine HCl


  (zyrTEC TAB)  10 mg  DAILY  PRN


 PO  3/17/18 13:45


 4/16/18 13:44  3/24/18 20:25


10 MG


 


 Felodipine


  (Plendil Tabcr)  5 mg  QAM


 PO  3/18/18 09:00


 4/17/18 08:59  3/24/18 07:50


5 MG


 


 Sodium Chloride


  (Ocean Nasal


 Spray)  2 sprays  UD  PRN


 NA  3/17/18 13:45


 4/16/18 13:44   


 


 


 Magnesium Oxide


  (Mag-Ox Tab)  200 mg  DAILY


 PO  3/18/18 09:00


 4/17/18 08:59  3/24/18 07:50


200 MG


 


 Pantoprazole


 Sodium


  (Protonix Tab)  40 mg  QAM


 PO  3/18/18 09:00


 4/17/18 08:59  3/24/18 07:49


40 MG


 


 Albuterol/


 Ipratropium


  (Duoneb)  3 ml  QIDR


 INH  3/17/18 16:00


 4/16/18 15:59  3/17/18 19:23


3 ML


 


 Famotidine 20 mg/


 Syringe  5 ml @ 2.5


 mls/min  Q12H


 IV  3/18/18 09:00


 4/17/18 08:59  3/24/18 20:30


2.5 MLS/MIN


 


 Furosemide 20 mg/


 Syringe  2 ml @ 4


 mls/min  DAILY


 IV  3/19/18 09:00


 4/18/18 08:59 Future Hold 3/19/18 08:23


4 MLS/MIN


 


 Lisinopril


  (Zestril Tab)  10 mg  QAM


 PO  3/19/18 09:00


 4/18/18 08:59 Future Hold 3/19/18 08:24


10 MG


 


 Diphenhydramine


 HCl


  (Benadryl Inj)  50 mg  BID


 IV.  3/19/18 21:00


 4/18/18 20:59 Future Hold 3/19/18 20:53


50 MG


 


 Metoprolol


 Succinate


  (Toprol Xl Tab)  25 mg  BID


 PO  3/19/18 21:00


 4/18/18 08:59  3/24/18 20:17


25 MG


 


 Heparin Sodium


  (Porcine)


  (Heparin Sq 5000


 Unit/0.5ml)  5,000 unit  Q8


 SQ  3/19/18 14:00


 4/18/18 13:59  3/24/18 20:22


5,000 UNIT


 


 Gentamicin Sulfate


  (Gentamicin 0.3%


 Oph Oint)  1 appln  4XDQ3H


 OP  3/19/18 13:00


 3/29/18 12:59  3/24/18 16:58


1 APPLN


 


 Acetaminophen


  (Tylenol Tab)  325 mg  Q6H  PRN


 PO  3/20/18 05:00


 4/16/18 13:14  3/23/18 09:17


325 MG


 


 Hydralazine HCl


  (Apresoline Tab)  25 mg  QID


 PO  3/23/18 12:00


 4/20/18 13:59  3/24/18 20:17


25 MG


 


 Isosorbide


 Dinitrate


  (Isordil Tab)  10 mg  TID@0700,1200,1700


 PO  3/23/18 12:00


 4/20/18 11:59  3/24/18 16:56


10 MG


 


 Terazosin HCl


  (Hytrin Cap)  2 mg  HS


 PO  3/23/18 21:00


 4/22/18 20:59  3/24/18 20:18


2 MG


 


 Amoxicillin/


 Clavulanate


 Potassium


  (Augmentin Tab)  875 mg  BIDM


 PO  3/24/18 20:45


 4/3/18 20:44

## 2018-03-25 VITALS — OXYGEN SATURATION: 96 %

## 2018-03-25 VITALS — OXYGEN SATURATION: 93 %

## 2018-03-25 VITALS
SYSTOLIC BLOOD PRESSURE: 117 MMHG | TEMPERATURE: 97.88 F | OXYGEN SATURATION: 96 % | DIASTOLIC BLOOD PRESSURE: 72 MMHG | HEART RATE: 101 BPM

## 2018-03-25 VITALS
HEART RATE: 66 BPM | SYSTOLIC BLOOD PRESSURE: 112 MMHG | DIASTOLIC BLOOD PRESSURE: 66 MMHG | OXYGEN SATURATION: 96 % | TEMPERATURE: 98.6 F

## 2018-03-25 VITALS
DIASTOLIC BLOOD PRESSURE: 73 MMHG | HEART RATE: 82 BPM | OXYGEN SATURATION: 88 % | TEMPERATURE: 98.24 F | SYSTOLIC BLOOD PRESSURE: 127 MMHG

## 2018-03-25 LAB
BUN SERPL-MCNC: 26 MG/DL (ref 7–18)
CALCIUM SERPL-MCNC: 8.4 MG/DL (ref 8.5–10.1)
CO2 SERPL-SCNC: 28 MMOL/L (ref 21–32)
CREAT SERPL-MCNC: 1.29 MG/DL (ref 0.6–1.2)
GLUCOSE SERPL-MCNC: 100 MG/DL (ref 70–99)
POTASSIUM SERPL-SCNC: 3.6 MMOL/L (ref 3.5–5.1)
SODIUM SERPL-SCNC: 137 MMOL/L (ref 136–145)

## 2018-03-25 RX ADMIN — FELODIPINE SCH MG: 5 TABLET, FILM COATED, EXTENDED RELEASE ORAL at 07:40

## 2018-03-25 RX ADMIN — HEPARIN SODIUM SCH UNIT: 10000 INJECTION, SOLUTION INTRAVENOUS; SUBCUTANEOUS at 22:00

## 2018-03-25 RX ADMIN — METOPROLOL SUCCINATE SCH MG: 25 TABLET, EXTENDED RELEASE ORAL at 20:00

## 2018-03-25 RX ADMIN — METOPROLOL SUCCINATE SCH MG: 25 TABLET, EXTENDED RELEASE ORAL at 07:39

## 2018-03-25 RX ADMIN — ALBUTEROL SULFATE SCH PUFFS: 90 AEROSOL, METERED RESPIRATORY (INHALATION) at 17:04

## 2018-03-25 RX ADMIN — DIPHENHYDRAMINE HYDROCHLORIDE AND ZINC ACETATE PRN APPLN: 20; 1 CREAM TOPICAL at 18:40

## 2018-03-25 RX ADMIN — FAMOTIDINE SCH MLS/MIN: 10 INJECTION INTRAVENOUS at 21:00

## 2018-03-25 RX ADMIN — Medication SCH MG: at 07:39

## 2018-03-25 RX ADMIN — PANTOPRAZOLE SCH MG: 40 TABLET, DELAYED RELEASE ORAL at 07:39

## 2018-03-25 RX ADMIN — ISOSORBIDE DINITRATE SCH MG: 5 TABLET ORAL at 17:05

## 2018-03-25 RX ADMIN — HEPARIN SODIUM SCH UNIT: 10000 INJECTION, SOLUTION INTRAVENOUS; SUBCUTANEOUS at 06:18

## 2018-03-25 RX ADMIN — GENTAMICIN SULFATE SCH APPLN: 3 OINTMENT OPHTHALMIC at 14:26

## 2018-03-25 RX ADMIN — Medication SCH MG: at 07:40

## 2018-03-25 RX ADMIN — FAMOTIDINE SCH MLS/MIN: 10 INJECTION INTRAVENOUS at 10:07

## 2018-03-25 RX ADMIN — GENTAMICIN SULFATE SCH APPLN: 3 OINTMENT OPHTHALMIC at 17:04

## 2018-03-25 RX ADMIN — AMOXICILLIN AND CLAVULANATE POTASSIUM SCH MG: 875; 125 TABLET, FILM COATED ORAL at 07:39

## 2018-03-25 RX ADMIN — GENTAMICIN SULFATE SCH APPLN: 3 OINTMENT OPHTHALMIC at 10:08

## 2018-03-25 RX ADMIN — GENTAMICIN SULFATE SCH APPLN: 3 OINTMENT OPHTHALMIC at 06:24

## 2018-03-25 RX ADMIN — HEPARIN SODIUM SCH UNIT: 10000 INJECTION, SOLUTION INTRAVENOUS; SUBCUTANEOUS at 14:00

## 2018-03-25 RX ADMIN — ISOSORBIDE DINITRATE SCH MG: 5 TABLET ORAL at 06:21

## 2018-03-25 RX ADMIN — AMOXICILLIN AND CLAVULANATE POTASSIUM SCH MG: 875; 125 TABLET, FILM COATED ORAL at 17:07

## 2018-03-25 RX ADMIN — ALBUTEROL SULFATE SCH PUFFS: 90 AEROSOL, METERED RESPIRATORY (INHALATION) at 20:00

## 2018-03-25 RX ADMIN — ALBUTEROL SULFATE SCH PUFFS: 90 AEROSOL, METERED RESPIRATORY (INHALATION) at 12:09

## 2018-03-25 RX ADMIN — ALBUTEROL SULFATE SCH PUFFS: 90 AEROSOL, METERED RESPIRATORY (INHALATION) at 07:41

## 2018-03-25 RX ADMIN — ISOSORBIDE DINITRATE SCH MG: 5 TABLET ORAL at 12:08

## 2018-03-25 RX ADMIN — CETIRIZINE HYDROCHLORIDE PRN MG: 10 TABLET, FILM COATED ORAL at 07:39

## 2018-03-25 NOTE — PROGRESS NOTE
Medicine Progress Note


Date & Time of Visit:


Mar 25, 2018 at 18:32.


Subjective


Patients rash is spreading across her back and is becoming pruritic at the 

first site in her axillae and groin. No vesicles/blisters or drainage noted. 

Other than feeling tired the patient has no complaints. No overnight events 

noted. Tolerating PO. Updated patient's  who was at the bedside.





Objective





Last 8 Hrs








  Date Time  Temp Pulse Resp B/P (MAP) Pulse Ox O2 Delivery O2 Flow Rate FiO2


 


3/25/18 16:00     93 Room Air  


 


3/25/18 15:54 36.8 82 18 127/73 (91) 88   








Physical Exam:


GENERAL: Patient is in no acute distress.


HEENT: No acute trauma, normocephalic, mucous membranes moist, no nasal 

congestion, no scleral icterus. B/L periorbital erythema and edema, L>R, scant 

left eye drainage


NECK: No stridor, trachea is midline.


LUNGS: Clear to auscultation bilaterally, no wheeze, no rhonchi, breath sounds 

equal.


HEART: Without murmurs gallops or rubs, regular rate and rhythm.


ABDOMEN: Soft, nontender, bowel sounds positive


EXTREMITIES: No cyanosis or edema


NEUROLOGIC: Oriented x 3, no acute motor or sensory deficits, no focal weakness.


SKIN: No jaundice, no diaphoresis. Diffuse erythematous maculo-papular rash 

noted in B/L groins, axilla, back, thighs and sides; blanches; non tender, no 

open lesions


Laboratory Results:





Last 24 Hours








Test


  3/25/18


07:39


 


Sodium Level 137 mmol/L 


 


Potassium Level 3.6 mmol/L 


 


Chloride Level 103 mmol/L 


 


Carbon Dioxide Level 28 mmol/L 


 


Anion Gap 5.0 mmol/L 


 


Blood Urea Nitrogen 26 mg/dl 


 


Creatinine 1.29 mg/dl 


 


Est Creatinine Clear Calc


Drug Dose 34.0 ml/min 


 


 


Estimated GFR (


American) 45.9 


 


 


Estimated GFR (Non-


American 39.6 


 


 


BUN/Creatinine Ratio 19.8 


 


Random Glucose 100 mg/dl 


 


Calcium Level 8.4 mg/dl 











Assessment & Plan


PRESEPTAL CELLULITIS: sepsis resolved


-having slow improvement of the preseptal cellulitis


-continued on IV Vanco and Zosyn


-ID consulted, appreciate recs, will switch to Augmentin today as discussed


-Facial/plastic surgery consulted, appreciate recs; gentamicin ophthalmic 

ointment ordered - warm compresses


-CT Facial and soft tissue neck: showing Mild nonspecific cellulitis of the 

right perimandibular and submandibular region.


-no abscess noted on CT


-on presentation patient had significant leukocytosis, lactic acidosis, low 

grade fever


-blood cultures negative





ERYTHEMATOUS RASH:


-new rash, highly suspicious for drug eruption; stopped the zosyn and vanco on 3

/24 and if no change in the rash would consider hydralazine as the next agent 

to stop


-nontender, nonpruritic 


-will check UA for casts





ELEVATED TROPONIN: probable Type 2 MI in the setting of Sepsis 


-known hx of CAD with medical management


-IV heparin stopped


-continue aspirin, statin, b-blocker


-Cardiology consulted, appreciate recs


-TTE: Report:


* -- Conclusions --


* Left ventricular systolic function is moderately reduced.


* Ejection Fraction = 30-35%.


* Septal motion is consistent with conduction abnormality.


* Otherwise moderate diffuse hypokinesis.


* The left atrium is moderately dilated.


* Aortic valve sclerosis mild, without significant aortic valvular stenosis.


* There is moderate to severe mitral annular calcification.


* There is mild mitral regurgitation.


-per outpatient Cardiology notes; patient has been having dyspnea on exertion


-no recent stress test 


-no symptoms of chest pain or palpitations but does have intermittent shortness 

of breath


-troponin elevated to >2 then trended down, CK trending down, maxed at [id number]+


-continue aspirin, statin, b-blocker


-transfer to medical





CHRONIC SYSTOLIC CHF: 


-LVEF reduction, per prior TTE; unknown cause, possibly ischemic cardiomyopathy


-will likely have further w/up - cardiac cath afterwards


-was on ACE-I and intermittent Lasix, these were discontinued in light of above


-patient started on isosorbide + hydralazine





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


-uses a cane at baseline


-PT/OT consulted


-did not lose consciousness; fall possibly related to hypovolemia


CT Head negative; additional radiographs negative for fractures





FOUZIA: resolved


-baseline Cr <1


-max was 1.55


-Cr trending up, encouraged PO hydration





HTN:


-bp was low on admission, now is elevated


-held ACE-I, HCTZ due to kidney injury


-continue b-blocker and monitor


-on isosorbide and hydralazine for CHF, hydralazine being titrated up for 

better BP control


Current Inpatient Medications:





Current Inpatient Medications








 Medications


  (Trade)  Dose


 Ordered  Sig/Sirisha


 Route  Start Time


 Stop Time Status Last Admin


Dose Admin


 


 Al Hydrox/Mg


 Hydrox/Simethicone


  (Maalox Max Susp)  15 ml  Q4H  PRN


 PO  3/17/18 13:15


 4/16/18 13:14   


 


 


 Magnesium


 Hydroxide


  (Milk Of


 Magnesia Susp)  30 ml  Q12H  PRN


 PO  3/17/18 13:15


 4/16/18 13:14   


 


 


 Ondansetron HCl


  (Zofran Inj)  4 mg  Q6H  PRN


 IV  3/17/18 13:15


 4/16/18 13:14   


 


 


 Albuterol


  (Ventolin Hfa


 Inhaler)  2 puffs  QID


 INH  3/17/18 17:00


 4/16/18 16:59  3/25/18 17:04


2 PUFFS


 


 Aspirin


  (Ecotrin Tab)  81 mg  DAILY


 PO  3/18/18 09:00


 4/17/18 08:59  3/25/18 07:39


81 MG


 


 Atorvastatin


 Calcium


  (Lipitor Tab)  40 mg  DAILY


 PO  3/18/18 09:00


 4/17/18 08:59 Future Hold 3/19/18 08:24


40 MG


 


 Cetirizine HCl


  (zyrTEC TAB)  10 mg  DAILY  PRN


 PO  3/17/18 13:45


 4/16/18 13:44  3/25/18 07:39


10 MG


 


 Felodipine


  (Plendil Tabcr)  5 mg  QAM


 PO  3/18/18 09:00


 4/17/18 08:59  3/25/18 07:40


5 MG


 


 Sodium Chloride


  (Ocean Nasal


 Spray)  2 sprays  UD  PRN


 NA  3/17/18 13:45


 4/16/18 13:44   


 


 


 Magnesium Oxide


  (Mag-Ox Tab)  200 mg  DAILY


 PO  3/18/18 09:00


 4/17/18 08:59  3/25/18 07:40


200 MG


 


 Pantoprazole


 Sodium


  (Protonix Tab)  40 mg  QAM


 PO  3/18/18 09:00


 4/17/18 08:59  3/25/18 07:39


40 MG


 


 Albuterol/


 Ipratropium


  (Duoneb)  3 ml  QIDR


 INH  3/17/18 16:00


 4/16/18 15:59  3/17/18 19:23


3 ML


 


 Famotidine 20 mg/


 Syringe  5 ml @ 2.5


 mls/min  Q12H


 IV  3/18/18 09:00


 4/17/18 08:59  3/25/18 10:07


2.5 MLS/MIN


 


 Furosemide 20 mg/


 Syringe  2 ml @ 4


 mls/min  DAILY


 IV  3/19/18 09:00


 4/18/18 08:59 Future Hold 3/19/18 08:23


4 MLS/MIN


 


 Lisinopril


  (Zestril Tab)  10 mg  QAM


 PO  3/19/18 09:00


 4/18/18 08:59 Future Hold 3/19/18 08:24


10 MG


 


 Diphenhydramine


 HCl


  (Benadryl Inj)  50 mg  BID


 IV.  3/19/18 21:00


 4/18/18 20:59 Future Hold 3/19/18 20:53


50 MG


 


 Metoprolol


 Succinate


  (Toprol Xl Tab)  25 mg  BID


 PO  3/19/18 21:00


 4/18/18 08:59  3/25/18 07:39


25 MG


 


 Heparin Sodium


  (Porcine)


  (Heparin Sq 5000


 Unit/0.5ml)  5,000 unit  Q8


 SQ  3/19/18 14:00


 4/18/18 13:59  3/25/18 06:18


5,000 UNIT


 


 Gentamicin Sulfate


  (Gentamicin 0.3%


 Oph Oint)  1 appln  4XDQ3H


 OP  3/19/18 13:00


 3/29/18 12:59  3/25/18 17:04


1 APPLN


 


 Acetaminophen


  (Tylenol Tab)  325 mg  Q6H  PRN


 PO  3/20/18 05:00


 4/16/18 13:14  3/23/18 09:17


325 MG


 


 Hydralazine HCl


  (Apresoline Tab)  25 mg  QID


 PO  3/23/18 12:00


 4/20/18 13:59  3/25/18 17:05


25 MG


 


 Isosorbide


 Dinitrate


  (Isordil Tab)  10 mg  TID@0700,1200,1700


 PO  3/23/18 12:00


 4/20/18 11:59  3/25/18 17:05


10 MG


 


 Terazosin HCl


  (Hytrin Cap)  2 mg  HS


 PO  3/23/18 21:00


 4/22/18 20:59  3/24/18 20:18


2 MG


 


 Amoxicillin/


 Clavulanate


 Potassium


  (Augmentin Tab)  875 mg  BIDM


 PO  3/24/18 20:45


 4/3/18 20:44  3/25/18 17:07


875 MG


 


 Diphenhydramine


 HCl


  (Benadryl Extra


 Strength Cream)  1 appln  Q8  PRN


 EXT  3/25/18 16:45


 4/24/18 16:44

## 2018-03-26 VITALS — TEMPERATURE: 98.42 F

## 2018-03-26 VITALS
HEART RATE: 110 BPM | SYSTOLIC BLOOD PRESSURE: 122 MMHG | TEMPERATURE: 100.22 F | OXYGEN SATURATION: 94 % | DIASTOLIC BLOOD PRESSURE: 74 MMHG

## 2018-03-26 VITALS
SYSTOLIC BLOOD PRESSURE: 160 MMHG | OXYGEN SATURATION: 94 % | HEART RATE: 82 BPM | TEMPERATURE: 99.32 F | DIASTOLIC BLOOD PRESSURE: 98 MMHG

## 2018-03-26 VITALS
OXYGEN SATURATION: 93 % | TEMPERATURE: 100.58 F | DIASTOLIC BLOOD PRESSURE: 77 MMHG | SYSTOLIC BLOOD PRESSURE: 150 MMHG | HEART RATE: 114 BPM

## 2018-03-26 VITALS — OXYGEN SATURATION: 94 %

## 2018-03-26 VITALS — OXYGEN SATURATION: 93 %

## 2018-03-26 LAB
BUN SERPL-MCNC: 22 MG/DL (ref 7–18)
CALCIUM SERPL-MCNC: 8.9 MG/DL (ref 8.5–10.1)
CO2 SERPL-SCNC: 27 MMOL/L (ref 21–32)
CREAT SERPL-MCNC: 1.31 MG/DL (ref 0.6–1.2)
CREAT UR-MCNC: 109 MG/DL
GLUCOSE SERPL-MCNC: 113 MG/DL (ref 70–99)
POTASSIUM SERPL-SCNC: 3.8 MMOL/L (ref 3.5–5.1)
SODIUM SERPL-SCNC: 136 MMOL/L (ref 136–145)
T4 FREE SERPL-MCNC: 407 MOMS/KG (ref 500–800)

## 2018-03-26 RX ADMIN — FELODIPINE SCH MG: 5 TABLET, FILM COATED, EXTENDED RELEASE ORAL at 07:50

## 2018-03-26 RX ADMIN — CETIRIZINE HYDROCHLORIDE PRN MG: 10 TABLET, FILM COATED ORAL at 07:50

## 2018-03-26 RX ADMIN — Medication SCH MG: at 07:51

## 2018-03-26 RX ADMIN — GENTAMICIN SULFATE SCH APPLN: 3 OINTMENT OPHTHALMIC at 06:31

## 2018-03-26 RX ADMIN — METOPROLOL SUCCINATE SCH MG: 25 TABLET, EXTENDED RELEASE ORAL at 07:50

## 2018-03-26 RX ADMIN — ACETAMINOPHEN PRN MG: 325 TABLET ORAL at 14:19

## 2018-03-26 RX ADMIN — GENTAMICIN SULFATE SCH APPLN: 3 OINTMENT OPHTHALMIC at 13:45

## 2018-03-26 RX ADMIN — GENTAMICIN SULFATE SCH APPLN: 3 OINTMENT OPHTHALMIC at 11:43

## 2018-03-26 RX ADMIN — HEPARIN SODIUM SCH UNIT: 10000 INJECTION, SOLUTION INTRAVENOUS; SUBCUTANEOUS at 13:54

## 2018-03-26 RX ADMIN — HEPARIN SODIUM SCH UNIT: 10000 INJECTION, SOLUTION INTRAVENOUS; SUBCUTANEOUS at 21:40

## 2018-03-26 RX ADMIN — AMOXICILLIN AND CLAVULANATE POTASSIUM SCH MG: 875; 125 TABLET, FILM COATED ORAL at 16:38

## 2018-03-26 RX ADMIN — HEPARIN SODIUM SCH UNIT: 10000 INJECTION, SOLUTION INTRAVENOUS; SUBCUTANEOUS at 06:39

## 2018-03-26 RX ADMIN — METOPROLOL SUCCINATE SCH MG: 25 TABLET, EXTENDED RELEASE ORAL at 21:49

## 2018-03-26 RX ADMIN — ALBUTEROL SULFATE SCH PUFFS: 90 AEROSOL, METERED RESPIRATORY (INHALATION) at 16:41

## 2018-03-26 RX ADMIN — CLINDAMYCIN HYDROCHLORIDE SCH MG: 150 CAPSULE ORAL at 21:41

## 2018-03-26 RX ADMIN — AMOXICILLIN AND CLAVULANATE POTASSIUM SCH MG: 875; 125 TABLET, FILM COATED ORAL at 07:50

## 2018-03-26 RX ADMIN — PANTOPRAZOLE SCH MG: 40 TABLET, DELAYED RELEASE ORAL at 07:50

## 2018-03-26 RX ADMIN — ISOSORBIDE DINITRATE SCH MG: 5 TABLET ORAL at 13:45

## 2018-03-26 RX ADMIN — DIPHENHYDRAMINE HYDROCHLORIDE AND ZINC ACETATE PRN APPLN: 20; 1 CREAM TOPICAL at 23:28

## 2018-03-26 RX ADMIN — FAMOTIDINE SCH MLS/MIN: 10 INJECTION INTRAVENOUS at 21:38

## 2018-03-26 RX ADMIN — ISOSORBIDE DINITRATE SCH MG: 5 TABLET ORAL at 07:50

## 2018-03-26 RX ADMIN — FAMOTIDINE SCH MLS/MIN: 10 INJECTION INTRAVENOUS at 07:57

## 2018-03-26 RX ADMIN — ISOSORBIDE DINITRATE SCH MG: 5 TABLET ORAL at 16:41

## 2018-03-26 RX ADMIN — ALBUTEROL SULFATE SCH PUFFS: 90 AEROSOL, METERED RESPIRATORY (INHALATION) at 07:50

## 2018-03-26 RX ADMIN — GENTAMICIN SULFATE SCH APPLN: 3 OINTMENT OPHTHALMIC at 16:42

## 2018-03-26 RX ADMIN — DIPHENHYDRAMINE HYDROCHLORIDE AND ZINC ACETATE PRN APPLN: 20; 1 CREAM TOPICAL at 06:32

## 2018-03-26 RX ADMIN — ALBUTEROL SULFATE SCH PUFFS: 90 AEROSOL, METERED RESPIRATORY (INHALATION) at 21:35

## 2018-03-26 RX ADMIN — ALBUTEROL SULFATE SCH PUFFS: 90 AEROSOL, METERED RESPIRATORY (INHALATION) at 13:45

## 2018-03-26 NOTE — DIAGNOSTIC IMAGING REPORT
CHEST ONE VIEW PORTABLE



CLINICAL HISTORY: fever    



COMPARISON STUDY:  3/20/2018



FINDINGS: The heart is mildly enlarged. There is mild vascular prominence

without evidence of overt failure. There is no lobar consolidation. There are

mild basilar atelectatic changes. There are no pleural effusions. There are

calcified pleural plaques.[ 



IMPRESSION: 

1. Cardiomegaly with mild vascular prominence but no evidence of overt failure

2. Calcified pleural plaques

3. No evidence of focal pulmonary consolidation







Electronically signed by:  John Boyce M.D.

3/26/2018 7:37 PM



Dictated Date/Time:  3/26/2018 7:36 PM

## 2018-03-26 NOTE — PROGRESS NOTE
Medicine Progress Note


Date & Time of Visit:


Mar 26, 2018 at 19:37.


Subjective


Patient reports the rash is spreading and that she feels worse. No overnight 

events noted. Patient reports decreased urine output but PO intake stable. Rash 

is pruritic and bothersome. No CP or SOB. Bp better controlled.





Objective





Last 8 Hrs








  Date Time  Temp Pulse Resp B/P (MAP) Pulse Ox O2 Delivery O2 Flow Rate FiO2


 


3/26/18 16:33 37.9 110 16 122/74 (90) 94 Room Air  


 


3/26/18 16:00      Room Air  


 


3/26/18 14:46 38.1 114 24 150/77 (101) 93 Room Air  








Physical Exam:


GENERAL: Patient is in no acute distress.


HEENT: No acute trauma, normocephalic, mucous membranes moist, no nasal 

congestion, no scleral icterus. B/L periorbital erythema and edema, L>R, scant 

left eye drainage


NECK: No stridor, trachea is midline.


LUNGS: Clear to auscultation bilaterally, no wheeze, no rhonchi, breath sounds 

equal.


HEART: Without murmurs gallops or rubs, regular rate and rhythm.


ABDOMEN: Soft, nontender, bowel sounds positive


EXTREMITIES: No cyanosis or edema


NEUROLOGIC: Oriented x 3, no acute motor or sensory deficits, no focal weakness.


SKIN: No jaundice, no diaphoresis. Diffuse erythematous maculo-papular rash 

noted in B/L groins, axilla, back, thighs and sides; blanches; non tender, no 

open lesions


Laboratory Results:





Last 24 Hours








Test


  3/25/18


20:42 3/26/18


06:45 3/26/18


07:24


 


Bedside Glucose 172 mg/dl   


 


Urine Color  YELLOW  


 


Urine Appearance  CLOUDY  


 


Urine pH  5.5  


 


Urine Specific Gravity  1.014  


 


Urine Protein  NEG  


 


Urine Glucose (UA)  NEG  


 


Urine Ketones  NEG  


 


Urine Occult Blood  NEG  


 


Urine Nitrite  NEG  


 


Urine Bilirubin  NEG  


 


Urine Urobilinogen  NEG  


 


Urine Leukocyte Esterase  TRACE  


 


Urine WBC (Auto)  5-10 /hpf  


 


Urine RBC (Auto)  0-4 /hpf  


 


Urine Hyaline Casts (Auto)  1-5 /lpf  


 


Urine Epithelial Cells (Auto)  >30 /lpf  


 


Urine Bacteria (Auto)  NEG  


 


Urine Yeast (Auto)  BUDDING  


 


Sodium Level   136 mmol/L 


 


Potassium Level   3.8 mmol/L 


 


Chloride Level   102 mmol/L 


 


Carbon Dioxide Level   27 mmol/L 


 


Anion Gap   7.0 mmol/L 


 


Blood Urea Nitrogen   22 mg/dl 


 


Creatinine   1.31 mg/dl 


 


Est Creatinine Clear Calc


Drug Dose 


  


  33.5 ml/min 


 


 


Estimated GFR (


American) 


  


  45.1 


 


 


Estimated GFR (Non-


American 


  


  38.9 


 


 


BUN/Creatinine Ratio   16.9 


 


Random Glucose   113 mg/dl 


 


Calcium Level   8.9 mg/dl 














 Date/Time


Source Procedure


Growth Status


 


 


 3/26/18 19:30


Blood Blood Culture


Pending Ordered


 


 3/26/18 19:23


Blood Blood Culture


Pending Ordered











Assessment & Plan


PRESEPTAL CELLULITIS: sepsis resolved


-having slow improvement of the preseptal periorbital cellulitis


-was on IV Vanco and Zosyn, stopped 3/24, switched to augmentin 3/24; today 

stopped augmentin and switched to clindamycin to eliminate penicillin 

medications in the event that this was related to the rash


-ID consulted, appreciate recs, discussed above plan regarding rash and 

switching abx


-Facial/plastic surgery consulted, appreciate recs; gentamicin ophthalmic 

ointment ordered - warm compresses


-CT Facial and soft tissue neck: showing Mild nonspecific cellulitis of the 

right perimandibular and submandibular region.


-no abscess noted on CT


-on presentation patient had significant leukocytosis, lactic acidosis, low 

grade fever


-blood cultures negative x2 sets


-repeat cultures due to increased fever





ERYTHEMATOUS RASH:


-new rash, highly suspicious for drug eruption; stopped the zosyn and vanco on 3

/24 and rash continues to progressively worse


-stop hydralazine as the next agent to stop


-nontender, nonpruritic 


-UA negative for casts


-stop augmentin to eliminate PCNs


-Derm consulted, spoke with Derm re: the rash which did seem likely to be a 

drug eruption, will see patient tomorrow and recommended teasing through meds 

one by one to find the offending agent





ELEVATED TROPONIN: probable Type 2 MI in the setting of Sepsis 


-known hx of CAD with medical management


-IV heparin stopped


-continue aspirin, statin, b-blocker


-Cardiology consulted, appreciate recs


-TTE: Report:


* -- Conclusions --


* Left ventricular systolic function is moderately reduced.


* Ejection Fraction = 30-35%.


* Septal motion is consistent with conduction abnormality.


* Otherwise moderate diffuse hypokinesis.


* The left atrium is moderately dilated.


* Aortic valve sclerosis mild, without significant aortic valvular stenosis.


* There is moderate to severe mitral annular calcification.


* There is mild mitral regurgitation.


-per outpatient Cardiology notes; patient has been having dyspnea on exertion


-no recent stress test 


-no symptoms of chest pain or palpitations but does have intermittent shortness 

of breath


-troponin elevated to >2 then trended down, CK trending down, maxed at [id number]+


-continue aspirin, statin, b-blocker


-transfer to medical





CHRONIC SYSTOLIC CHF: 


-LVEF reduction, per prior TTE; unknown cause, possibly ischemic cardiomyopathy


-will likely have further w/up - cardiac cath afterwards


-was on ACE-I and intermittent Lasix, these were discontinued in light of above


-patient started on isosorbide + hydralazine; hydralazine stopped to due 

possible contribution to rash





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


-uses a cane at baseline


-PT/OT consulted


-did not lose consciousness; fall possibly related to hypovolemia


CT Head negative; additional radiographs negative for fractures





FOUZIA: resolved


-baseline Cr <1


-max was 1.55


-Cr trending up, encouraged PO hydration, check FENA


-may consider Nephrology if worsening


-hydrate with gentle IV fluids





HTN:


-bp was low on admission, now is elevated


-held ACE-I, HCTZ due to kidney injury


-continue b-blocker and monitor


-on isosorbide and hydralazine for CHF, hydralazine being titrated up for 

better BP control


Current Inpatient Medications:





Current Inpatient Medications








 Medications


  (Trade)  Dose


 Ordered  Sig/Sirisha


 Route  Start Time


 Stop Time Status Last Admin


Dose Admin


 


 Al Hydrox/Mg


 Hydrox/Simethicone


  (Maalox Max Susp)  15 ml  Q4H  PRN


 PO  3/17/18 13:15


 4/16/18 13:14   


 


 


 Magnesium


 Hydroxide


  (Milk Of


 Magnesia Susp)  30 ml  Q12H  PRN


 PO  3/17/18 13:15


 4/16/18 13:14   


 


 


 Ondansetron HCl


  (Zofran Inj)  4 mg  Q6H  PRN


 IV  3/17/18 13:15


 4/16/18 13:14   


 


 


 Albuterol


  (Ventolin Hfa


 Inhaler)  2 puffs  QID


 INH  3/17/18 17:00


 4/16/18 16:59  3/26/18 16:41


2 PUFFS


 


 Aspirin


  (Ecotrin Tab)  81 mg  DAILY


 PO  3/18/18 09:00


 4/17/18 08:59  3/26/18 07:51


81 MG


 


 Atorvastatin


 Calcium


  (Lipitor Tab)  40 mg  DAILY


 PO  3/18/18 09:00


 4/17/18 08:59 Future Hold 3/19/18 08:24


40 MG


 


 Cetirizine HCl


  (zyrTEC TAB)  10 mg  DAILY  PRN


 PO  3/17/18 13:45


 4/16/18 13:44  3/26/18 07:50


10 MG


 


 Felodipine


  (Plendil Tabcr)  5 mg  QAM


 PO  3/18/18 09:00


 4/17/18 08:59  3/26/18 07:50


5 MG


 


 Sodium Chloride


  (Ocean Nasal


 Spray)  2 sprays  UD  PRN


 NA  3/17/18 13:45


 4/16/18 13:44   


 


 


 Magnesium Oxide


  (Mag-Ox Tab)  200 mg  DAILY


 PO  3/18/18 09:00


 4/17/18 08:59  3/26/18 07:51


200 MG


 


 Pantoprazole


 Sodium


  (Protonix Tab)  40 mg  QAM


 PO  3/18/18 09:00


 4/17/18 08:59  3/26/18 07:50


40 MG


 


 Albuterol/


 Ipratropium


  (Duoneb)  3 ml  QIDR


 INH  3/17/18 16:00


 4/16/18 15:59  3/17/18 19:23


3 ML


 


 Famotidine 20 mg/


 Syringe  5 ml @ 2.5


 mls/min  Q12H


 IV  3/18/18 09:00


 4/17/18 08:59  3/26/18 07:57


2.5 MLS/MIN


 


 Furosemide 20 mg/


 Syringe  2 ml @ 4


 mls/min  DAILY


 IV  3/19/18 09:00


 4/18/18 08:59 Future Hold 3/19/18 08:23


4 MLS/MIN


 


 Lisinopril


  (Zestril Tab)  10 mg  QAM


 PO  3/19/18 09:00


 4/18/18 08:59 Future Hold 3/19/18 08:24


10 MG


 


 Diphenhydramine


 HCl


  (Benadryl Inj)  50 mg  BID


 IV.  3/19/18 21:00


 4/18/18 20:59 Future Hold 3/19/18 20:53


50 MG


 


 Heparin Sodium


  (Porcine)


  (Heparin Sq 5000


 Unit/0.5ml)  5,000 unit  Q8


 SQ  3/19/18 14:00


 4/18/18 13:59  3/26/18 13:54


5,000 UNIT


 


 Gentamicin Sulfate


  (Gentamicin 0.3%


 Oph Oint)  1 appln  4XDQ3H


 OP  3/19/18 13:00


 3/29/18 12:59  3/26/18 16:42


1 APPLN


 


 Acetaminophen


  (Tylenol Tab)  325 mg  Q6H  PRN


 PO  3/20/18 05:00


 4/16/18 13:14  3/26/18 14:19


325 MG


 


 Hydralazine HCl


  (Apresoline Tab)  25 mg  QID


 PO  3/23/18 12:00


 4/20/18 13:59 Future Hold 3/26/18 07:50


25 MG


 


 Isosorbide


 Dinitrate


  (Isordil Tab)  10 mg  TID@0700,1200,1700


 PO  3/23/18 12:00


 4/20/18 11:59  3/26/18 16:41


10 MG


 


 Terazosin HCl


  (Hytrin Cap)  2 mg  HS


 PO  3/23/18 21:00


 4/22/18 20:59  3/25/18 21:00


2 MG


 


 Amoxicillin/


 Clavulanate


 Potassium


  (Augmentin Tab)  875 mg  BIDM


 PO  3/24/18 20:45


 4/3/18 20:44  3/26/18 16:38


875 MG


 


 Diphenhydramine


 HCl


  (Benadryl Extra


 Strength Cream)  1 appln  Q8  PRN


 EXT  3/25/18 16:45


 4/24/18 16:44  3/26/18 06:32


1 APPLN


 


 Metoprolol


 Succinate


  (Toprol Xl Tab)  37.5 mg  BID


 PO  3/26/18 20:00


 4/18/18 08:59   


 


 


 Sodium Chloride  500 ml @ 


 50 mls/hr  Q10H


 IV  3/26/18 19:00


 3/27/18 04:59

## 2018-03-26 NOTE — CARDIOLOGY FOLLOW-UP
Subjective


General


Date of Service:


Mar 26, 2018.


Chief Complaint:  Facial cellulitis; Rash


Pt evaluation today including:  conversation w/ patient, physical exam, chart 

review, lab review, review of studies, conversation w/ consultant, review of 

inpatient medication list





History of Present Illness


Patient feeling ok. Notes diffuse rash, starting on Saturday. Began in axilla 

and groin region, now across torso, back, neck.  No blisters. Mildly pruritic. 

No chest pain or SOB. No edema. Facial swelling improved.





Allergies


Coded Allergies:  


     Latex1 -Allergic Contact Dermititis (Verified  Allergy, Unknown, SLIGHT 

RASH, 3/17/18)


     Doxycycline (Unverified  Adverse Reaction, Unknown, GI UPSET, 3/17/18)


     Meperidine (Unverified  Adverse Reaction, Unknown, GI UPSET, 3/17/18)


     Propoxyphene (Unverified  Adverse Reaction, Unknown, GI UPSET, 3/17/18)


     Sulfa Antibiotics (Unverified  Adverse Reaction, Unknown, GI UPSET, 3/17/18

)





Social History


Smoking Status:  Never Smoker


Hx Tobacco Use In Past Year?:  No


Hx Alcohol Use - Type And Amou:  Yes (1 GLASS WINE/DAY)


Hx Substance Use - Type And Am:  No





Problem List


Medical Problems:


(1) NSTEMI (non-ST elevated myocardial infarction)


Status: Acute  





(2) Periorbital cellulitis


Status: Acute  





(3) Sepsis


Status: Acute  











Physical Exam


Vital Signs





Last Vital Signs Documentation








  Date Time  Temp Pulse Resp B/P (MAP) Pulse Ox O2 Delivery O2 Flow Rate FiO2


 


3/26/18 09:00      Room Air  


 


3/26/18 08:36     94   


 


3/26/18 07:47 37.4 82 18 160/98 (118)    


 


3/20/18 04:00       2.0 











Physical Exam


Constitutional:  


   Level of Distress:  NAD, acutely ill


Psychiatric:  


   Mental Status:  active & alert


   Orientation:  to time, to place, to person


ENMT:  pertinent finding (improved facial erythema and edema)


Neck:  pertinent finding


Lungs:  


   Auscultation:  no wheezing, no rales/crackles, no rhonchi, decreased breath 

sounds


Cardiovascular:  


   Heart Auscultation:  RRR, no murmurs, no rubs, II/VI KRISTINE, gallop


Peripheral Pulses:  


   Dorsalis Pedis Pulse:  normal on the left, normal on the right


Abdomen:  


   Bowel Sounds:  normal


   Inspection & Palpation:  soft, non-distended, no masses


Extremities:  no edema (in the lower extremities), no clubbing


Neurologic:  


   Cranial Nerves:  grossly intact


Additional Comments:


Erythematous, non blistering rash from neck, torso, axilla, to groin





Assessment and Plan


Assessment and Plan


ASSESSMENT: 


1. Admission following a mechanical fall, significant periorbital and facial 

cellulitis, rhabdomyolysis (resolving), renal dysfunction


2. diffuse erythematous rash- suspect drug eruption. 


3. Elevated troponin, asymptomatic in regards to angina symptoms. Suspect 

rhabdomyolysis, infection, acute renal dysfunction


4. Echocardiography with reduction in left ventricular systolic function, EF 30-

35%.  


5. Chronic left bundle branch block. 


6. Compensated systolic congestive heart failure signs and symptoms. 


7. Hypertension


8. Dyslipidemia. Currently off statin secondary to rhabdomyolysis, abnormal LFT'

s


9. Hypokalemia, improved.


 


RECOMMENDATIONS:  


Diffuse rash concerning for drug eruption. 


Inciting agent unknown. 


IV antibiotics stopped on Saturday at onset. Transitioned to Augmentin. 


Rash continues to worsen. 


Other new medications include nitrates and hydralazine.  


Will hold hydralazine. 


consider steroids


Titrate terazosin as needed for BP. 





Outpatient resting echocardiography to reassess LV systolic function then 

consider need for Lexiscan nuclear stress testing versus diagnostic cardiac 

catheterization. 





Case discussed with Dr. Spencer 





Patient seen and examined personally.  Assessment and plan as above in addition 

we will increase Toprol to 37.5 mg twice per day for further heart rate and 

blood pressure control.  Will repeat echocardiogram in a.m. to reassess LV 

function


Luis Spencer MD





Laboratory Results





Last 24 Hours








Test


  3/25/18


20:42 3/26/18


06:45 3/26/18


07:24


 


Bedside Glucose 172 mg/dl   


 


Urine Color  YELLOW  


 


Urine Appearance  CLOUDY  


 


Urine pH  5.5  


 


Urine Specific Gravity  1.014  


 


Urine Protein  NEG  


 


Urine Glucose (UA)  NEG  


 


Urine Ketones  NEG  


 


Urine Occult Blood  NEG  


 


Urine Nitrite  NEG  


 


Urine Bilirubin  NEG  


 


Urine Urobilinogen  NEG  


 


Urine Leukocyte Esterase  TRACE  


 


Urine WBC (Auto)  5-10 /hpf  


 


Urine RBC (Auto)  0-4 /hpf  


 


Urine Hyaline Casts (Auto)  1-5 /lpf  


 


Urine Epithelial Cells (Auto)  >30 /lpf  


 


Urine Bacteria (Auto)  NEG  


 


Urine Yeast (Auto)  BUDDING  


 


Sodium Level   136 mmol/L 


 


Potassium Level   3.8 mmol/L 


 


Chloride Level   102 mmol/L 


 


Carbon Dioxide Level   27 mmol/L 


 


Anion Gap   7.0 mmol/L 


 


Blood Urea Nitrogen   22 mg/dl 


 


Creatinine   1.31 mg/dl 


 


Est Creatinine Clear Calc


Drug Dose 


  


  33.5 ml/min 


 


 


Estimated GFR (


American) 


  


  45.1 


 


 


Estimated GFR (Non-


American 


  


  38.9 


 


 


BUN/Creatinine Ratio   16.9 


 


Random Glucose   113 mg/dl 


 


Calcium Level   8.9 mg/dl

## 2018-03-26 NOTE — ECHOCARDIOGRAM REPORT
*NOTICE TO RECEIVING PARTY AGENCY**  This information is strictly Confidential and protected under 
[place] law.  [place] law prohibits you from making any further disclosure of this 
information unless further disclosure is expressly permitted by the written consent of the person to 
whom it pertains or is authorized by law.  A general authorization for the release of medical or 
other information is not sufficient for this purpose.  Hospital accepts no responsibility if the 
information is made available to any other person, INCLUDING THE PATIENT.



Interpretation Summary

  *  Conclusions --

  *  The left ventricle is normal in size.

  *  There is moderate concentric left ventricular hypertrophy.

  *  Septal motion is consistent with conduction abnormality.

  *  Ejection Fraction = 65-70%.

  *  Aortic valve sclerosis mild, without significant aortic valvular stenosis.

  *  There is heavy calcification of the posterior mitral valve annulus mild restriction of leaflet 
mobility and mild mitral insufficiency

  *  In comparison to prior study of 3/17/2018, overall LV function has substantially improved

Procedure Details

     *  A two-dimensional transthoracic echocardiogram was performed.

     *  Limited views were obtained.

     *  A contrast injection of Definity was performed to improve assessment of LV function.

     *  Contrast was injected into an intravenous site in the left arm.

     *  One vial of Definity ultrasound contrast was diluted in normal saline to a total volume of 
10 ml.  A total of '1' ml of solution was administered during imaging.

     *  Lot # [id number] of Definity utilized for procedure.

     *  Expiration date 1APR19.

     *  The attending nurse who injected the contrast agent was BONY Martinez RN.

Left Ventricle

  *  The left ventricle is normal in size.

  *  There is moderate concentric left ventricular hypertrophy.

  *  Ejection Fraction = 65-70%.

  *  Septal motion is consistent with conduction abnormality.

Atria

  *  The left atrium is mildly dilated.

Mitral Valve

  *  There is heavy calcification of the posterior mitral valve annulus mild restriction of leaflet 
mobility and mild mitral insufficiency

Aortic Valve

  *  Aortic valve sclerosis mild, without significant aortic valvular stenosis.



MMode 2D Measurements and Calculations

IVSd 10 cm

IVSs 1.2 cm



LVIDd 4.5 cm

LVIDs 3.2 cm

LVPWd 1.0 cm

LVPWs 1.6 cm



IVS/LVPW 0.95 

FS 29.7 %

EDV(Teich) 94.0 ml

ESV(Teich) 40.5 ml

EF(Teich) 56.9 %



EDV(cubed) 93.1 ml

ESV(cubed) 32.3 ml

EF(cubed) 65.3 %

% IVS thick 19.3 %

% LVPW thick 54.4 %





LV mass(C)d 159.5 grams

LV mass(C)dI 90.0 grams/m\S\2

LV mass(C)s 152.3 grams

LV mass(C)sI 85.9 grams/m\S\2



SV(Teich) 53.5 ml

SI(Teich) 30.2 ml/m\S\2

SV(cubed) 60.8 ml

SI(cubed) 34.3 ml/m\S\2



LVAd ap4 28.6 cm\S\2

LVLd ap4 7.7 cm

EDV(MOD-sp4) 89.5 ml

EDV(sp4-el) 91.0 ml

LVAs ap4 18.4 cm\S\2

LVLs ap4 7.0 cm

ESV(MOD-sp4) 39.8 ml

ESV(sp4-el) 40.9 ml

EF(MOD-sp4) 55.6 %

EF(sp4-el) 55.0 %



EDV(MOD-sp2) 59.0 ml

ESV(MOD-sp2) 18.9 ml

EF(MOD-sp2) 67.9 %





SV(MOD-sp4) 49.8 ml

SI(MOD-sp4) 28.1 ml/m\S\2



SV(MOD-sp2) 40.1 ml

SI(MOD-sp2) 22.6 ml/m\S\2



SV(sp4-el) 50.1 ml

SI(sp4-el) 28.3 ml/m\S\2

## 2018-03-27 VITALS
OXYGEN SATURATION: 95 % | TEMPERATURE: 97.52 F | SYSTOLIC BLOOD PRESSURE: 114 MMHG | DIASTOLIC BLOOD PRESSURE: 69 MMHG | HEART RATE: 74 BPM

## 2018-03-27 VITALS — OXYGEN SATURATION: 92 %

## 2018-03-27 VITALS
HEART RATE: 75 BPM | TEMPERATURE: 98.42 F | DIASTOLIC BLOOD PRESSURE: 74 MMHG | OXYGEN SATURATION: 96 % | SYSTOLIC BLOOD PRESSURE: 129 MMHG

## 2018-03-27 VITALS
TEMPERATURE: 97.88 F | HEART RATE: 69 BPM | DIASTOLIC BLOOD PRESSURE: 73 MMHG | OXYGEN SATURATION: 92 % | SYSTOLIC BLOOD PRESSURE: 129 MMHG

## 2018-03-27 VITALS
TEMPERATURE: 97.34 F | DIASTOLIC BLOOD PRESSURE: 74 MMHG | OXYGEN SATURATION: 92 % | SYSTOLIC BLOOD PRESSURE: 126 MMHG | HEART RATE: 89 BPM

## 2018-03-27 VITALS — OXYGEN SATURATION: 95 %

## 2018-03-27 LAB
BUN SERPL-MCNC: 23 MG/DL (ref 7–18)
CALCIUM SERPL-MCNC: 8.7 MG/DL (ref 8.5–10.1)
CO2 SERPL-SCNC: 24 MMOL/L (ref 21–32)
CREAT SERPL-MCNC: 1.03 MG/DL (ref 0.6–1.2)
EOSINOPHIL NFR BLD AUTO: 216 K/UL (ref 130–400)
GLUCOSE SERPL-MCNC: 131 MG/DL (ref 70–99)
HCT VFR BLD CALC: 30.4 % (ref 37–47)
HGB BLD-MCNC: 10.3 G/DL (ref 12–16)
MCH RBC QN AUTO: 29.3 PG (ref 25–34)
MCHC RBC AUTO-ENTMCNC: 33.9 G/DL (ref 32–36)
MCV RBC AUTO: 86.6 FL (ref 80–100)
PMV BLD AUTO: 10 FL (ref 7.4–10.4)
POTASSIUM SERPL-SCNC: 4 MMOL/L (ref 3.5–5.1)
RED CELL DISTRIBUTION WIDTH CV: 15.2 % (ref 11.5–14.5)
RED CELL DISTRIBUTION WIDTH SD: 47.6 FL (ref 36.4–46.3)
SODIUM SERPL-SCNC: 134 MMOL/L (ref 136–145)
WBC # BLD AUTO: 26.85 K/UL (ref 4.8–10.8)

## 2018-03-27 PROCEDURE — 0HBHXZX EXCISION OF RIGHT UPPER LEG SKIN, EXTERNAL APPROACH, DIAGNOSTIC: ICD-10-PCS

## 2018-03-27 RX ADMIN — HEPARIN SODIUM SCH UNIT: 10000 INJECTION, SOLUTION INTRAVENOUS; SUBCUTANEOUS at 13:08

## 2018-03-27 RX ADMIN — ISOSORBIDE DINITRATE SCH MG: 5 TABLET ORAL at 19:52

## 2018-03-27 RX ADMIN — FAMOTIDINE SCH MLS/MIN: 10 INJECTION INTRAVENOUS at 21:27

## 2018-03-27 RX ADMIN — GENTAMICIN SULFATE SCH APPLN: 3 OINTMENT OPHTHALMIC at 10:10

## 2018-03-27 RX ADMIN — GENTAMICIN SULFATE SCH APPLN: 3 OINTMENT OPHTHALMIC at 13:04

## 2018-03-27 RX ADMIN — HEPARIN SODIUM SCH UNIT: 10000 INJECTION, SOLUTION INTRAVENOUS; SUBCUTANEOUS at 19:58

## 2018-03-27 RX ADMIN — DIPHENHYDRAMINE HYDROCHLORIDE AND ZINC ACETATE PRN APPLN: 20; 1 CREAM TOPICAL at 10:51

## 2018-03-27 RX ADMIN — ISOSORBIDE DINITRATE SCH MG: 5 TABLET ORAL at 13:04

## 2018-03-27 RX ADMIN — ACETAMINOPHEN PRN MG: 325 TABLET ORAL at 19:56

## 2018-03-27 RX ADMIN — CLINDAMYCIN HYDROCHLORIDE SCH MG: 150 CAPSULE ORAL at 19:53

## 2018-03-27 RX ADMIN — Medication SCH MG: at 07:56

## 2018-03-27 RX ADMIN — DIPHENHYDRAMINE HYDROCHLORIDE AND ZINC ACETATE PRN APPLN: 20; 1 CREAM TOPICAL at 15:40

## 2018-03-27 RX ADMIN — METOPROLOL SUCCINATE SCH MG: 25 TABLET, EXTENDED RELEASE ORAL at 19:52

## 2018-03-27 RX ADMIN — FELODIPINE SCH MG: 5 TABLET, FILM COATED, EXTENDED RELEASE ORAL at 07:55

## 2018-03-27 RX ADMIN — ALBUTEROL SULFATE SCH PUFFS: 90 AEROSOL, METERED RESPIRATORY (INHALATION) at 07:56

## 2018-03-27 RX ADMIN — PANTOPRAZOLE SCH MG: 40 TABLET, DELAYED RELEASE ORAL at 07:54

## 2018-03-27 RX ADMIN — ISOSORBIDE DINITRATE SCH MG: 5 TABLET ORAL at 06:18

## 2018-03-27 RX ADMIN — METOPROLOL SUCCINATE SCH MG: 25 TABLET, EXTENDED RELEASE ORAL at 08:28

## 2018-03-27 RX ADMIN — FAMOTIDINE SCH MLS/MIN: 10 INJECTION INTRAVENOUS at 07:54

## 2018-03-27 RX ADMIN — ACETAMINOPHEN PRN MG: 325 TABLET ORAL at 10:54

## 2018-03-27 RX ADMIN — CETIRIZINE HYDROCHLORIDE PRN MG: 10 TABLET, FILM COATED ORAL at 19:51

## 2018-03-27 RX ADMIN — ALBUTEROL SULFATE SCH PUFFS: 90 AEROSOL, METERED RESPIRATORY (INHALATION) at 13:03

## 2018-03-27 RX ADMIN — CLINDAMYCIN HYDROCHLORIDE SCH MG: 150 CAPSULE ORAL at 06:12

## 2018-03-27 RX ADMIN — GENTAMICIN SULFATE SCH APPLN: 3 OINTMENT OPHTHALMIC at 06:12

## 2018-03-27 RX ADMIN — CLINDAMYCIN HYDROCHLORIDE SCH MG: 150 CAPSULE ORAL at 13:04

## 2018-03-27 RX ADMIN — HEPARIN SODIUM SCH UNIT: 10000 INJECTION, SOLUTION INTRAVENOUS; SUBCUTANEOUS at 06:16

## 2018-03-27 RX ADMIN — GENTAMICIN SULFATE SCH APPLN: 3 OINTMENT OPHTHALMIC at 15:39

## 2018-03-27 RX ADMIN — ALBUTEROL SULFATE SCH PUFFS: 90 AEROSOL, METERED RESPIRATORY (INHALATION) at 20:00

## 2018-03-27 NOTE — CARDIOLOGY PROGRESS NOTE
DATE: 03/27/2018

 

The patient seen and examined.  Chart, medications, telemetry reviewed.

 

SUBJECTIVE:  The patient clinically appears improved.  Notes rash across her

body is coalescing.  Notes no chest pains.  Notes no tachypalpitations. 

Blood pressures have come under better control.  Notes no edema.

 

OBJECTIVE:

VITAL SIGNS:  Heart rate is 69, blood pressure is 129/73.

NECK:  Thin.  There is no jugular venous distention.

LUNGS:  Clear.

CARDIOVASCULAR:  Regular.  There is no S3 gallop.

ABDOMEN:  Soft.

EXTREMITIES:  Without significant edema.

 

LABORATORY DATA:  Sodium is 134, potassium is 4.0, chloride is 102, bicarb is

23, creatinine is 1.0.  Hemoglobin is 10.3.

 

Echocardiogram yesterday as noted demonstrated substantial improvement in LV

systolic function, EF now hyperdynamic, EF 65-70%, rule out distinct

segmental abnormality.

 

IMPRESSION:  Complex 77-year-old female admitted with acute facial cellulitis

with initial echocardiogram during acute illness demonstrating diminished

left ventricular systolic function.  With treatment of underlying medical

issues, left ventricular systolic function has recovered to normal to

hyperdynamic.

 

RECOMMENDATIONS:  Would continue current dosing of Toprol at 37.5 mg twice

per day.  Low-dose nitrates will be continued and given good blood pressure

control.  We will plan on assessment of ischemic concerns post-hospital

discharge with stress nuclear imaging in 2-4 weeks post-hospital release.  We

will continue to follow the patient.  Acute illness of metabolic demands.

## 2018-03-27 NOTE — DERMATOLOGY CONSULTATION
DATE OF CONSULTATION:  03/27/2018

 

CHIEF COMPLAINT:  Rash.

 

HISTORY OF PRESENT ILLNESS:  Laura Arnett was seen in consultation for

evaluation of a diffuse pruritic cutaneous eruption of several days'

duration.  

The patient was hospitalized with a diagnosis of periorbital

cellulitis after hypotension and a fall.  

She was treated with a variety of antibiotics,  most recently, clindamycin which

she continues on.  

Since her hospitalization, she  developed a hypertensive episode and has been 
treated with several antihypertensive agents.

 

The eruption developed first in the periorbital area and over

the past several days has spread to areas including the trunk and

extremities, burns and is quite pruritic.

 

The patient reports a prior history of a similar eruption occurring after 
periorbital

inflammation 13 years ago at which time she was hospitalized and developed a

similar although less severe eruption.

 

Past medical, family, social history, review of systems, medications,

allergies as noted on the chart.  The patient is otherwise in stable health. 

She had recently had a cataract extraction in January.

 

After developing the periorbital inflammation, she was treated with gentamycin 
cream which she continues on at this time.

 She denies any history of allergies to medication other than an adverse 
reaction to doxycyline.



PHYSICAL EXAMINATION:

GENERAL:  Reveals pleasant, well-nourished white female, type 1 skin.  Alert

and oriented x3, normal mood and affect.

HEAD AND NECK:  Reveals left sided  periorbital edema with diffuse

facial erythema, which includes the neck, chest, back, abdominal area,

proximal lower extremities and proximal upper extremities  with relative

sparing of the distal extremities.  No bullous lesions are noted.

 

CLINICAL IMPRESSION:  Morbilliform  drug eruption versus a hypersensitivity

reaction  to periorbital  cellulitis, to the topical gentamycin cream being 
applied;

other possibilities would include a hypersensitivity reaction to an occult 
periocular herpes simplex infection.

 

PLAN:  Biopsy a representative area on the right

thigh to determine if there is evidence of a drug eruption or if another 
reaction is occuring.

 

After consent obtained, lidocaine with epinephrine local anesthesia followed

by 4 mm punch biopsy of a representative portion of the eruption on the right

lateral thigh.  Specimen submitted for pathology, closed with two 4-0 nylon

sutures which should be removed in 10-14 days.  The patient may apply

Vaseline petroleum jelly to the area and cover with a Band-Aid on a daily

basis after washing with soap and water.

 

For treatment of the eruption, would recommend  use of prednisone beginning at 
an equivalent dose of 50mg prednisone daily and tapered over three weeks as per 
patient response.

 Use of oral antihistamines would also be advised for symptomatic relief.

 

Based on history, holding medications that are likely suspects in causing the 
eruption will be required to result in resolution; these would most likely be 
the antibiotics, but could include the antihypertensives recently prescribed.



The patient will be informed of the result of the biopsy report when

available.

 



 

 

SYBILD

## 2018-03-28 VITALS
OXYGEN SATURATION: 93 % | DIASTOLIC BLOOD PRESSURE: 68 MMHG | TEMPERATURE: 97.52 F | HEART RATE: 61 BPM | SYSTOLIC BLOOD PRESSURE: 120 MMHG

## 2018-03-28 VITALS
SYSTOLIC BLOOD PRESSURE: 132 MMHG | OXYGEN SATURATION: 99 % | DIASTOLIC BLOOD PRESSURE: 71 MMHG | HEART RATE: 74 BPM | TEMPERATURE: 97.7 F

## 2018-03-28 VITALS — OXYGEN SATURATION: 99 %

## 2018-03-28 VITALS
SYSTOLIC BLOOD PRESSURE: 136 MMHG | OXYGEN SATURATION: 94 % | TEMPERATURE: 98.96 F | DIASTOLIC BLOOD PRESSURE: 68 MMHG | HEART RATE: 75 BPM

## 2018-03-28 VITALS — TEMPERATURE: 97.34 F

## 2018-03-28 VITALS — OXYGEN SATURATION: 94 %

## 2018-03-28 LAB
BUN SERPL-MCNC: 29 MG/DL (ref 7–18)
CALCIUM SERPL-MCNC: 8.8 MG/DL (ref 8.5–10.1)
CO2 SERPL-SCNC: 25 MMOL/L (ref 21–32)
CREAT SERPL-MCNC: 1.09 MG/DL (ref 0.6–1.2)
GLUCOSE SERPL-MCNC: 141 MG/DL (ref 70–99)
POTASSIUM SERPL-SCNC: 4.6 MMOL/L (ref 3.5–5.1)
SODIUM SERPL-SCNC: 134 MMOL/L (ref 136–145)

## 2018-03-28 RX ADMIN — CLINDAMYCIN HYDROCHLORIDE SCH MG: 150 CAPSULE ORAL at 06:04

## 2018-03-28 RX ADMIN — ISOSORBIDE DINITRATE SCH MG: 5 TABLET ORAL at 06:04

## 2018-03-28 RX ADMIN — GENTAMICIN SULFATE SCH APPLN: 3 OINTMENT OPHTHALMIC at 06:04

## 2018-03-28 RX ADMIN — HEPARIN SODIUM SCH UNIT: 10000 INJECTION, SOLUTION INTRAVENOUS; SUBCUTANEOUS at 22:00

## 2018-03-28 RX ADMIN — METOPROLOL SUCCINATE SCH MG: 25 TABLET, EXTENDED RELEASE ORAL at 09:01

## 2018-03-28 RX ADMIN — GENTAMICIN SULFATE SCH APPLN: 3 OINTMENT OPHTHALMIC at 10:27

## 2018-03-28 RX ADMIN — ALBUTEROL SULFATE SCH PUFFS: 90 AEROSOL, METERED RESPIRATORY (INHALATION) at 13:13

## 2018-03-28 RX ADMIN — ISOSORBIDE DINITRATE SCH MG: 5 TABLET ORAL at 12:07

## 2018-03-28 RX ADMIN — Medication SCH MG: at 08:33

## 2018-03-28 RX ADMIN — FELODIPINE SCH MG: 5 TABLET, FILM COATED, EXTENDED RELEASE ORAL at 08:37

## 2018-03-28 RX ADMIN — CETIRIZINE HYDROCHLORIDE PRN MG: 10 TABLET, FILM COATED ORAL at 12:08

## 2018-03-28 RX ADMIN — HEPARIN SODIUM SCH UNIT: 10000 INJECTION, SOLUTION INTRAVENOUS; SUBCUTANEOUS at 06:05

## 2018-03-28 RX ADMIN — ALBUTEROL SULFATE SCH PUFFS: 90 AEROSOL, METERED RESPIRATORY (INHALATION) at 09:27

## 2018-03-28 RX ADMIN — Medication SCH MG: at 08:34

## 2018-03-28 RX ADMIN — PANTOPRAZOLE SCH MG: 40 TABLET, DELAYED RELEASE ORAL at 08:38

## 2018-03-28 RX ADMIN — FAMOTIDINE SCH MLS/MIN: 10 INJECTION INTRAVENOUS at 10:57

## 2018-03-28 RX ADMIN — CLINDAMYCIN HYDROCHLORIDE SCH MG: 150 CAPSULE ORAL at 14:07

## 2018-03-28 RX ADMIN — HEPARIN SODIUM SCH UNIT: 10000 INJECTION, SOLUTION INTRAVENOUS; SUBCUTANEOUS at 14:09

## 2018-03-28 RX ADMIN — ALBUTEROL SULFATE SCH PUFFS: 90 AEROSOL, METERED RESPIRATORY (INHALATION) at 20:00

## 2018-03-28 RX ADMIN — CLINDAMYCIN HYDROCHLORIDE SCH MG: 150 CAPSULE ORAL at 22:00

## 2018-03-28 RX ADMIN — ALBUTEROL SULFATE SCH PUFFS: 90 AEROSOL, METERED RESPIRATORY (INHALATION) at 17:29

## 2018-03-28 RX ADMIN — ISOSORBIDE DINITRATE SCH MG: 5 TABLET ORAL at 17:29

## 2018-03-28 RX ADMIN — METOPROLOL SUCCINATE SCH MG: 25 TABLET, EXTENDED RELEASE ORAL at 20:00

## 2018-03-28 RX ADMIN — GENTAMICIN SULFATE SCH APPLN: 3 OINTMENT OPHTHALMIC at 13:14

## 2018-03-28 RX ADMIN — FAMOTIDINE SCH MLS/MIN: 10 INJECTION INTRAVENOUS at 21:00

## 2018-03-28 NOTE — PROGRESS NOTE
Medicine Progress Note


Date & Time of Visit:


Mar 27, 2018 at 16:40


.


Subjective





Severe diffuse pruritic rash.


No fever.


Facial cellulitis improved.


No chest pain.


No cough or shortness of breath.


No nausea or vomiting.


Formed stool this morning, loose stool this afternoon.


 visiting.


.





Objective





Last 8 Hrs








  Date Time  Temp Pulse Resp B/P (MAP) Pulse Ox O2 Delivery O2 Flow Rate FiO2


 


3/28/18 00:10      Room Air  


 


3/27/18 23:44 36.9 75 20 129/74 (92) 96 Room Air  








Physical Exam:





General- lying in bed no distress


Lungs- clear to auscultation; no respiratory distress


Cardiovascular- RRR; no gallop; no JVD; trace pretibial edema


Abdomen- + bowel sounds, soft, nontender 


Extremities- no cyanosis; no calf tenderness 


Neuro- alert, oriented


Skin- extensive rash involving face, trunk, extremities; macular papular with 

coalescence


.


Laboratory Results:





Last 24 Hours








Test


  3/27/18


05:53


 


White Blood Count 26.85 K/uL 


 


Red Blood Count 3.51 M/uL 


 


Hemoglobin 10.3 g/dL 


 


Hematocrit 30.4 % 


 


Mean Corpuscular Volume 86.6 fL 


 


Mean Corpuscular Hemoglobin 29.3 pg 


 


Mean Corpuscular Hemoglobin


Concent 33.9 g/dl 


 


 


RDW Standard Deviation 47.6 fL 


 


RDW Coefficient of Variation 15.2 % 


 


Platelet Count 216 K/uL 


 


Mean Platelet Volume 10.0 fL 


 


Sodium Level 134 mmol/L 


 


Potassium Level 4.0 mmol/L 


 


Chloride Level 102 mmol/L 


 


Carbon Dioxide Level 24 mmol/L 


 


Anion Gap 8.0 mmol/L 


 


Blood Urea Nitrogen 23 mg/dl 


 


Creatinine 1.03 mg/dl 


 


Est Creatinine Clear Calc


Drug Dose 42.6 ml/min 


 


 


Estimated GFR (


American) 60.3 


 


 


Estimated GFR (Non-


American 52.0 


 


 


BUN/Creatinine Ratio 22.2 


 


Random Glucose 131 mg/dl 


 


Osmolality 285 mOsm/kg 


 


Calcium Level 8.7 mg/dl 











Assessment & Plan





PROBABLE SEPSIS / FACIAL CELLULITIS


Presented with facial cellulitis associated with fever, tachycardia, 

leukocytosis, elevated lactate.


No apparent abscess per CT.  Blood cultures obtained have remained negative.


Initially received intravenous vancomycin and piperacillin/tazobactam.


Those antibiotics discontinued because of the rash.


Now receiving clindamycin.





ELEVATED CARDIAC MARKERS


Seen in consultation by Cardiology.


History of nonocclusive coronary artery disease and chronic left bundle branch 

block.


Echocardiogram on 3/17/18 demonstrated diffuse hypokinesis, LVEF of 30-35%.


Repeat echocardiogram on 3/26/18 demonstrated moderate concentric LVH, LVEF 65-

70%, mild aortic valve sclerosis without stenosis, calcification of mitral 

valve annulus with mild mitral insufficiency.


Acute coronary syndrome felt to be unlikely.


Further evaluation recommended as outpatient, Lexiscan stress test versus 

diagnostic catheterization.





HYPERTENSION


Currently receiving metoprolol and felodipine.


Follow and titrate therapy.





SLEEP APNEA 


Continue CPAP.





RASH


Extensive maculopapular rash.


Probable drug reaction; culprit uncertain, possibly due to piperacillin/

tazobactam but receiving several other medications as well.


Dermatology consult.


Managed with steroids and antihistamines.


Skin biopsy being considered.





DISPOSITION


Expected discharge to home.


Internal Medicine follow-up with Dr. Cata Lozada.


.


Current Inpatient Medications:





Current Inpatient Medications








 Medications


  (Trade)  Dose


 Ordered  Sig/Sirisha


 Route  Start Time


 Stop Time Status Last Admin


Dose Admin


 


 Al Hydrox/Mg


 Hydrox/Simethicone


  (Maalox Max Susp)  15 ml  Q4H  PRN


 PO  3/17/18 13:15


 4/16/18 13:14   


 


 


 Magnesium


 Hydroxide


  (Milk Of


 Magnesia Susp)  30 ml  Q12H  PRN


 PO  3/17/18 13:15


 4/16/18 13:14   


 


 


 Ondansetron HCl


  (Zofran Inj)  4 mg  Q6H  PRN


 IV  3/17/18 13:15


 4/16/18 13:14   


 


 


 Albuterol


  (Ventolin Hfa


 Inhaler)  2 puffs  QID


 INH  3/17/18 17:00


 4/16/18 16:59  3/27/18 20:00


2 PUFFS


 


 Aspirin


  (Ecotrin Tab)  81 mg  DAILY


 PO  3/18/18 09:00


 4/17/18 08:59  3/27/18 07:56


81 MG


 


 Atorvastatin


 Calcium


  (Lipitor Tab)  40 mg  DAILY


 PO  3/18/18 09:00


 4/17/18 08:59 Future Hold 3/19/18 08:24


40 MG


 


 Cetirizine HCl


  (zyrTEC TAB)  10 mg  DAILY  PRN


 PO  3/17/18 13:45


 4/16/18 13:44  3/27/18 19:51


10 MG


 


 Felodipine


  (Plendil Tabcr)  5 mg  QAM


 PO  3/18/18 09:00


 4/17/18 08:59  3/27/18 07:55


5 MG


 


 Sodium Chloride


  (Ocean Nasal


 Spray)  2 sprays  UD  PRN


 NA  3/17/18 13:45


 4/16/18 13:44   


 


 


 Magnesium Oxide


  (Mag-Ox Tab)  200 mg  DAILY


 PO  3/18/18 09:00


 4/17/18 08:59  3/27/18 07:56


200 MG


 


 Pantoprazole


 Sodium


  (Protonix Tab)  40 mg  QAM


 PO  3/18/18 09:00


 4/17/18 08:59  3/27/18 07:54


40 MG


 


 Albuterol/


 Ipratropium


  (Duoneb)  3 ml  QIDR


 INH  3/17/18 16:00


 4/16/18 15:59  3/17/18 19:23


3 ML


 


 Famotidine 20 mg/


 Syringe  5 ml @ 2.5


 mls/min  Q12H


 IV  3/18/18 09:00


 4/17/18 08:59  3/27/18 21:27


2.5 MLS/MIN


 


 Furosemide 20 mg/


 Syringe  2 ml @ 4


 mls/min  DAILY


 IV  3/19/18 09:00


 4/18/18 08:59 Future Hold 3/19/18 08:23


4 MLS/MIN


 


 Lisinopril


  (Zestril Tab)  10 mg  QAM


 PO  3/19/18 09:00


 4/18/18 08:59 Future Hold 3/19/18 08:24


10 MG


 


 Diphenhydramine


 HCl


  (Benadryl Inj)  50 mg  BID


 IV.  3/19/18 21:00


 4/18/18 20:59 Future Hold 3/19/18 20:53


50 MG


 


 Heparin Sodium


  (Porcine)


  (Heparin Sq 5000


 Unit/0.5ml)  5,000 unit  Q8


 SQ  3/19/18 14:00


 4/18/18 13:59  3/27/18 19:58


5,000 UNIT


 


 Gentamicin Sulfate


  (Gentamicin 0.3%


 Oph Oint)  1 appln  4XDQ3H


 OP  3/19/18 13:00


 3/29/18 12:59  3/27/18 15:39


1 APPLN


 


 Acetaminophen


  (Tylenol Tab)  325 mg  Q6H  PRN


 PO  3/20/18 05:00


 4/16/18 13:14  3/27/18 19:56


325 MG


 


 Hydralazine HCl


  (Apresoline Tab)  25 mg  QID


 PO  3/23/18 12:00


 4/20/18 13:59 Future Hold 3/26/18 07:50


25 MG


 


 Isosorbide


 Dinitrate


  (Isordil Tab)  10 mg  TID@0700,1200,1700


 PO  3/23/18 12:00


 4/20/18 11:59  3/27/18 19:52


10 MG


 


 Terazosin HCl


  (Hytrin Cap)  2 mg  HS


 PO  3/23/18 21:00


 4/22/18 20:59  3/27/18 19:53


2 MG


 


 Diphenhydramine


 HCl


  (Benadryl Extra


 Strength Cream)  1 appln  Q8  PRN


 EXT  3/25/18 16:45


 4/24/18 16:44  3/27/18 15:40


1 APPLN


 


 Metoprolol


 Succinate


  (Toprol Xl Tab)  37.5 mg  BID


 PO  3/26/18 20:00


 4/18/18 08:59  3/27/18 19:52


37.5 MG


 


 Clindamycin HCl


  (Cleocin Cap)  300 mg  Q8


 PO  3/26/18 22:00


 4/5/18 21:59  3/27/18 19:53


300 MG


 


 Diphenhydramine


 HCl


  (Benadryl Cap)  25 mg  Q6H  PRN


 PO  3/27/18 21:15


 4/26/18 21:14

## 2018-03-28 NOTE — CARDIOLOGY FOLLOW-UP
Subjective


General


Date of Service:


Mar 28, 2018.


Chief Complaint:  Facial cellulitis; Rash


Pt evaluation today including:  conversation w/ patient, physical exam, chart 

review, lab review, review of studies, review of inpatient medication list





History of Present Illness


Patient feeling tired. Notes ongoing diffuse pruritic rash. No improvement. NO 

SOB or CP.





Allergies


Coded Allergies:  


     Latex1 -Allergic Contact Dermititis (Verified  Allergy, Unknown, SLIGHT 

RASH, 3/17/18)


     Doxycycline (Unverified  Adverse Reaction, Unknown, GI UPSET, 3/17/18)


     Meperidine (Unverified  Adverse Reaction, Unknown, GI UPSET, 3/17/18)


     Propoxyphene (Unverified  Adverse Reaction, Unknown, GI UPSET, 3/17/18)


     Sulfa Antibiotics (Unverified  Adverse Reaction, Unknown, GI UPSET, 3/17/18

)





Social History


Smoking Status:  Never Smoker


Hx Tobacco Use In Past Year?:  No


Hx Alcohol Use - Type And Amou:  Yes (1 GLASS WINE/DAY)


Hx Substance Use - Type And Am:  No





Problem List


Medical Problems:


(1) NSTEMI (non-ST elevated myocardial infarction)


Status: Acute  





(2) Periorbital cellulitis


Status: Acute  





(3) Sepsis


Status: Acute  











Review of Systems


Respiratory:  No wheezing, No shortness of breath, No dyspnea at rest, No 

hemoptysis


Cardiac:  No chest pain, No orthopnea, No PND, No edema, No palpitations





Physical Exam


Vital Signs





Last Vital Signs Documentation








  Date Time  Temp Pulse Resp B/P (MAP) Pulse Ox O2 Delivery O2 Flow Rate FiO2


 


3/28/18 10:34 36.3       


 


3/28/18 09:00      Room Air  


 


3/28/18 08:54     94   


 


3/28/18 07:30  75 19 136/68 (90)    


 


3/20/18 04:00       2.0 











Physical Exam


Constitutional:  


   General Apperance:  obese


   Level of Distress:  NAD, acutely ill


Psychiatric:  


   Mental Status:  active & alert


   Orientation:  to time, to place, to person


ENMT:  pertinent finding (improved facial erythema and edema)


Lungs:  


   Auscultation:  no wheezing, no rales/crackles, no rhonchi, decreased breath 

sounds


Cardiovascular:  


   Heart Auscultation:  RRR, no murmurs, no rubs, II/VI KRISTINE, gallop


Peripheral Pulses:  


   Dorsalis Pedis Pulse:  normal on the left, normal on the right


Abdomen:  


   Bowel Sounds:  normal


   Inspection & Palpation:  soft, non-distended, no masses


Extremities:  no edema (in the lower extremities), no clubbing


Neurologic:  


   Cranial Nerves:  grossly intact


Additional Comments:


diffuse erythematous rash torso, b/l arms, b/l legs





Assessment and Plan


Assessment and Plan


ASSESSMENT: 


1. Admission following a mechanical fall, significant periorbital and facial 

cellulitis, rhabdomyolysis (resolving), renal dysfunction


2. diffuse erythematous rash- suspect drug eruption. 


3. Elevated troponin, asymptomatic in regards to angina symptoms. Suspect 

rhabdomyolysis, infection, acute renal dysfunction


4. Echocardiography with reduction in left ventricular systolic function, EF 30-

35%, Repeat echo with normal LV function. 


5. Chronic left bundle branch block. 


6. Compensated systolic congestive heart failure signs and symptoms. 


7. Hypertension - improved. 


8. Dyslipidemia. Currently off statin secondary to rhabdomyolysis, abnormal LFT'

s


9. Hypokalemia, improved.


 


RECOMMENDATIONS:  


Diffuse rash concerning for drug eruption. 


Inciting agent unknown. 


Derm consult. Continue steroids. 


Interval improvement in LV function noted on repeat echo. 


Continue ASA, beat blocker and nitrates


Lisinopril discontinued due to concerns for angioedema. 


Hydralazine discontinued. 


Will need Lexiscan nuclear stress test as an outpatient to r/o underlying 

ischemic heart disease.





Case discussed with Dr. Spencer 





Agree with assessment and plan as above.


Luis Spencer MD





Laboratory Results





Last 24 Hours








Test


  3/28/18


06:17


 


Sodium Level 134 mmol/L 


 


Potassium Level 4.6 mmol/L 


 


Chloride Level 103 mmol/L 


 


Carbon Dioxide Level 25 mmol/L 


 


Anion Gap 6.0 mmol/L 


 


Blood Urea Nitrogen 29 mg/dl 


 


Creatinine 1.09 mg/dl 


 


Est Creatinine Clear Calc


Drug Dose 40.3 ml/min 


 


 


Estimated GFR (


American) 56.3 


 


 


Estimated GFR (Non-


American 48.6 


 


 


BUN/Creatinine Ratio 26.7 


 


Random Glucose 141 mg/dl 


 


Calcium Level 8.8 mg/dl

## 2018-03-29 VITALS
DIASTOLIC BLOOD PRESSURE: 76 MMHG | SYSTOLIC BLOOD PRESSURE: 160 MMHG | TEMPERATURE: 97.34 F | HEART RATE: 67 BPM | OXYGEN SATURATION: 93 %

## 2018-03-29 VITALS — SYSTOLIC BLOOD PRESSURE: 136 MMHG | HEART RATE: 60 BPM | DIASTOLIC BLOOD PRESSURE: 76 MMHG

## 2018-03-29 VITALS
TEMPERATURE: 97.52 F | SYSTOLIC BLOOD PRESSURE: 137 MMHG | HEART RATE: 62 BPM | OXYGEN SATURATION: 96 % | DIASTOLIC BLOOD PRESSURE: 76 MMHG

## 2018-03-29 VITALS — OXYGEN SATURATION: 96 %

## 2018-03-29 VITALS
TEMPERATURE: 97.88 F | OXYGEN SATURATION: 93 % | SYSTOLIC BLOOD PRESSURE: 152 MMHG | DIASTOLIC BLOOD PRESSURE: 75 MMHG | HEART RATE: 65 BPM

## 2018-03-29 VITALS — OXYGEN SATURATION: 99 %

## 2018-03-29 RX ADMIN — HEPARIN SODIUM SCH UNIT: 10000 INJECTION, SOLUTION INTRAVENOUS; SUBCUTANEOUS at 21:14

## 2018-03-29 RX ADMIN — RANITIDINE SCH MG: 150 TABLET ORAL at 21:07

## 2018-03-29 RX ADMIN — ISOSORBIDE DINITRATE SCH MG: 5 TABLET ORAL at 16:38

## 2018-03-29 RX ADMIN — ISOSORBIDE DINITRATE SCH MG: 5 TABLET ORAL at 08:44

## 2018-03-29 RX ADMIN — Medication SCH MG: at 08:45

## 2018-03-29 RX ADMIN — METOPROLOL SUCCINATE SCH MG: 25 TABLET, EXTENDED RELEASE ORAL at 21:08

## 2018-03-29 RX ADMIN — PANTOPRAZOLE SCH MG: 40 TABLET, DELAYED RELEASE ORAL at 08:45

## 2018-03-29 RX ADMIN — ISOSORBIDE DINITRATE SCH MG: 5 TABLET ORAL at 11:49

## 2018-03-29 RX ADMIN — ALBUTEROL SULFATE SCH PUFFS: 90 AEROSOL, METERED RESPIRATORY (INHALATION) at 21:06

## 2018-03-29 RX ADMIN — FELODIPINE SCH MG: 5 TABLET, FILM COATED, EXTENDED RELEASE ORAL at 08:44

## 2018-03-29 RX ADMIN — Medication SCH MG: at 08:46

## 2018-03-29 RX ADMIN — RANITIDINE SCH MG: 150 TABLET ORAL at 08:44

## 2018-03-29 RX ADMIN — CETIRIZINE HYDROCHLORIDE SCH MG: 10 TABLET, FILM COATED ORAL at 08:43

## 2018-03-29 RX ADMIN — HEPARIN SODIUM SCH UNIT: 10000 INJECTION, SOLUTION INTRAVENOUS; SUBCUTANEOUS at 14:11

## 2018-03-29 RX ADMIN — CLINDAMYCIN HYDROCHLORIDE SCH MG: 150 CAPSULE ORAL at 08:44

## 2018-03-29 RX ADMIN — ALBUTEROL SULFATE SCH PUFFS: 90 AEROSOL, METERED RESPIRATORY (INHALATION) at 08:46

## 2018-03-29 RX ADMIN — METOPROLOL SUCCINATE SCH MG: 25 TABLET, EXTENDED RELEASE ORAL at 08:45

## 2018-03-29 RX ADMIN — HEPARIN SODIUM SCH UNIT: 10000 INJECTION, SOLUTION INTRAVENOUS; SUBCUTANEOUS at 06:21

## 2018-03-29 RX ADMIN — CETIRIZINE HYDROCHLORIDE PRN MG: 10 TABLET, FILM COATED ORAL at 01:28

## 2018-03-29 RX ADMIN — CLINDAMYCIN HYDROCHLORIDE SCH MG: 150 CAPSULE ORAL at 06:22

## 2018-03-29 RX ADMIN — ALBUTEROL SULFATE SCH PUFFS: 90 AEROSOL, METERED RESPIRATORY (INHALATION) at 11:50

## 2018-03-29 RX ADMIN — ALBUTEROL SULFATE SCH PUFFS: 90 AEROSOL, METERED RESPIRATORY (INHALATION) at 16:34

## 2018-03-29 NOTE — CARDIOLOGY FOLLOW-UP
Subjective


General


Date of Service:


Mar 29, 2018.


Chief Complaint:  Facial cellulitis; Rash


Pt evaluation today including:  conversation w/ patient, physical exam, chart 

review, lab review, review of studies, review of inpatient medication list





History of Present Illness





Patient reports feeling "unconformable" today with significant diffuse itching 

from rash. Denies chest pain or SOB.





Allergies


Coded Allergies:  


     Latex1 -Allergic Contact Dermititis (Verified  Allergy, Unknown, SLIGHT 

RASH, 3/17/18)


     Doxycycline (Unverified  Adverse Reaction, Unknown, GI UPSET, 3/17/18)


     Meperidine (Unverified  Adverse Reaction, Unknown, GI UPSET, 3/17/18)


     Propoxyphene (Unverified  Adverse Reaction, Unknown, GI UPSET, 3/17/18)


     Sulfa Antibiotics (Unverified  Adverse Reaction, Unknown, GI UPSET, 3/17/18

)





Social History


Smoking Status:  Never Smoker


Hx Tobacco Use In Past Year?:  No


Hx Alcohol Use - Type And Amou:  Yes (1 GLASS WINE/DAY)


Hx Substance Use - Type And Am:  No





Problem List


Medical Problems:


(1) NSTEMI (non-ST elevated myocardial infarction)


Status: Acute  





(2) Periorbital cellulitis


Status: Acute  





(3) Sepsis


Status: Acute  











Review of Systems


Respiratory:  No cough, No shortness of breath, No dyspnea at rest, No 

hemoptysis


Cardiac:  No chest pain, No PND, No edema, No palpitations





Physical Exam


Vital Signs





Last Vital Signs Documentation








  Date Time  Temp Pulse Resp B/P (MAP) Pulse Ox O2 Delivery O2 Flow Rate FiO2


 


3/29/18 07:38 36.3 67 18 160/76 (104) 93 Room Air  


 


3/20/18 04:00       2.0 











Physical Exam


Constitutional:  


   General Apperance:  obese


   Level of Distress:  NAD, acutely ill


Psychiatric:  


   Mental Status:  active & alert


   Orientation:  to time, to place, to person


ENMT:  pertinent finding (improved facial erythema and edema)


Lungs:  


   Auscultation:  no wheezing, no rales/crackles, no rhonchi, decreased breath 

sounds


Cardiovascular:  


   Heart Auscultation:  RRR, no murmurs, no rubs, II/VI KRISTINE, gallop


Peripheral Pulses:  


   Dorsalis Pedis Pulse:  normal on the left, normal on the right


Abdomen:  


   Bowel Sounds:  normal


   Inspection & Palpation:  soft, non-distended, no masses


Extremities:  no edema (in the lower extremities), no clubbing


Neurologic:  


   Cranial Nerves:  grossly intact





Assessment and Plan


Assessment and Plan


ASSESSMENT: 


1. Admission following a mechanical fall, significant periorbital and facial 

cellulitis, rhabdomyolysis (resolving), renal dysfunction


2. diffuse erythematous rash- suspect drug eruption. 


3. Elevated troponin, asymptomatic in regards to angina symptoms. Suspect 

rhabdomyolysis, infection, acute renal dysfunction


4. Echocardiography with reduction in left ventricular systolic function, EF 30-

35%, Repeat echo with normal LV function. 


5. Chronic left bundle branch block. 


6. Compensated systolic congestive heart failure signs and symptoms. 


7. Hypertension - improved. 


8. Dyslipidemia. Currently off statin secondary to rhabdomyolysis, abnormal LFT'

s


9. Hypokalemia, improved.


 


RECOMMENDATIONS:  


Diffuse rash concerning for drug eruption. 


Inciting agent unknown. 


Derm consult. Biopsy pending. Continue steroids. 


Interval improvement in LV function noted on repeat echo. 


Continue ASA, beat blocker and nitrates


Lisinopril discontinued due to concerns for angioedema. 


Hydralazine discontinued. 


Will need Lexiscan nuclear stress test as an outpatient to r/o underlying 

ischemic heart disease.





Will sign off. Please notify on call cardiologist with additional questions or 

concerns.

## 2018-03-29 NOTE — INFECTIOUS DISEASE PROGRESS NT
Progress Note


Date of Service


Mar 29, 2018.





Subjective


Pt evaluation today including:  conversation w/ patient, physical exam, chart 

review, lab review, review of studies, conversation w/ consultant, review of 

inpatient medication list


Patient complaining of diffuse pruritus.  Rash non confluent, face is much 

better.  Dermatology consult noted, skin biopsy pending.





   All Other Systems:  Reviewed and Negative





Medications





Current Inpatient Medications








 Medications


  (Trade)  Dose


 Ordered  Sig/Sirisha


 Route  Start Time


 Stop Time Status Last Admin


Dose Admin


 


 Al Hydrox/Mg


 Hydrox/Simethicone


  (Maalox Max Susp)  15 ml  Q4H  PRN


 PO  3/17/18 13:15


 18 13:14   


 


 


 Magnesium


 Hydroxide


  (Milk Of


 Magnesia Susp)  30 ml  Q12H  PRN


 PO  3/17/18 13:15


 18 13:14   


 


 


 Ondansetron HCl


  (Zofran Inj)  4 mg  Q6H  PRN


 IV  3/17/18 13:15


 18 13:14   


 


 


 Albuterol


  (Ventolin Hfa


 Inhaler)  2 puffs  QID


 INH  3/17/18 17:00


 18 16:59  3/29/18 08:46


2 PUFFS


 


 Aspirin


  (Ecotrin Tab)  81 mg  DAILY


 PO  3/18/18 09:00


 18 08:59  3/29/18 08:45


81 MG


 


 Atorvastatin


 Calcium


  (Lipitor Tab)  40 mg  DAILY


 PO  3/18/18 09:00


 18 08:59 Future Hold 3/19/18 08:24


40 MG


 


 Felodipine


  (Plendil Tabcr)  5 mg  QAM


 PO  3/18/18 09:00


 18 08:59  3/29/18 08:44


5 MG


 


 Sodium Chloride


  (Ocean Nasal


 Spray)  2 sprays  UD  PRN


 NA  3/17/18 13:45


 18 13:44   


 


 


 Magnesium Oxide


  (Mag-Ox Tab)  200 mg  DAILY


 PO  3/18/18 09:00


 18 08:59  3/29/18 08:46


200 MG


 


 Pantoprazole


 Sodium


  (Protonix Tab)  40 mg  QAM


 PO  3/18/18 09:00


 18 08:59  3/29/18 08:45


40 MG


 


 Albuterol/


 Ipratropium


  (Duoneb)  3 ml  QIDR


 INH  3/17/18 16:00


 18 15:59  3/17/18 19:23


3 ML


 


 Furosemide 20 mg/


 Syringe  2 ml @ 4


 mls/min  DAILY


 IV  3/19/18 09:00


 18 08:59 Future Hold 3/19/18 08:23


4 MLS/MIN


 


 Lisinopril


  (Zestril Tab)  10 mg  QAM


 PO  3/19/18 09:00


 18 08:59 Future Hold 3/19/18 08:24


10 MG


 


 Diphenhydramine


 HCl


  (Benadryl Inj)  50 mg  BID


 IV.  3/19/18 21:00


 18 20:59 Future Hold 3/19/18 20:53


50 MG


 


 Heparin Sodium


  (Porcine)


  (Heparin Sq 5000


 Unit/0.5ml)  5,000 unit  Q8


 SQ  3/19/18 14:00


 18 13:59  3/29/18 06:21


5,000 UNIT


 


 Acetaminophen


  (Tylenol Tab)  325 mg  Q6H  PRN


 PO  3/20/18 05:00


 18 13:14  3/27/18 19:56


325 MG


 


 Isosorbide


 Dinitrate


  (Isordil Tab)  10 mg  TID@0700,1200,1700


 PO  3/23/18 12:00


 18 11:59  3/29/18 08:44


10 MG


 


 Terazosin HCl


  (Hytrin Cap)  2 mg  HS


 PO  3/23/18 21:00


 18 20:59  3/28/18 21:00


2 MG


 


 Diphenhydramine


 HCl


  (Benadryl Extra


 Strength Cream)  1 appln  Q8  PRN


 EXT  3/25/18 16:45


 18 16:44  3/27/18 15:40


1 APPLN


 


 Metoprolol


 Succinate


  (Toprol Xl Tab)  37.5 mg  BID


 PO  3/26/18 20:00


 18 08:59  3/29/18 08:45


37.5 MG


 


 Clindamycin HCl


  (Cleocin Cap)  300 mg  Q8


 PO  3/26/18 22:00


 18 21:59  3/29/18 08:44


300 MG


 


 Diphenhydramine


 HCl


  (Benadryl Cap)  25 mg  Q6H  PRN


 PO  3/27/18 21:15


 18 21:14  3/29/18 08:48


25 MG


 


 Diphenhydramine


 HCl


  (Benadryl Cap)  50 mg  HS


 PO  3/28/18 21:00


 18 20:59  3/28/18 21:00


50 MG


 


 Cetirizine HCl


  (zyrTEC TAB)  10 mg  DAILY


 PO  3/29/18 08:00


 18 13:44  3/29/18 08:43


10 MG


 


 Prednisone


  (PredniSONE TAB)  40 mg  DAILY


 PO  3/29/18 08:00


 18 07:59  3/29/18 08:44


40 MG


 


 Ranitidine HCl


  (zANTac TAB)  150 mg  BID


 PO  3/29/18 08:00


 18 07:59  3/29/18 08:44


150 MG











Objective


Vital Signs











  Date Time  Temp Pulse Resp B/P (MAP) Pulse Ox O2 Delivery O2 Flow Rate FiO2


 


3/29/18 07:38 36.3 67 18 160/76 (104) 93 Room Air  


 


3/29/18 00:00     99 Room Air  


 


3/28/18 23:38 36.4 61 18 120/68 (85) 93 Room Air  


 


3/28/18 20:00     99 Room Air  


 


3/28/18 16:00 36.5 74 20 132/71 (91) 99 Room Air  


 


3/28/18 14:28      Room Air  


 


3/28/18 10:34 36.3       











Physical Exam


General Appearance:  WD/WN, no apparent distress


Eyes:  normal inspection, EOMI, sclerae normal


ENT:  pharynx normal, + pertinent finding (Facial swelling much improved)


Neck:  supple, no adenopathy, thyroid normal, trachea midline


Respiratory/Chest:  chest non-tender, lungs clear, normal breath sounds, no 

respiratory distress


Cardiovascular:  regular rate, rhythm, no gallop, no murmur


Abdomen:  normal bowel sounds, non tender, soft, no organomegaly


Extremities:  non-tender, no calf tenderness, normal capillary refill


Neurologic/Psychiatric:  alert, oriented x 3


Skin:  normal color, warm/dry, + rash (Diffuse maculopapular)


Lymphatic:  no adenopathy


Notes:


----





Laboratory Results





--------------------------------------------------------------------------------

------------





RUN DATE: 18                [facility] LAB             

    PAGE 1   


RUN TIME: 701                            Specimen Inquiry                    


--------------------------------------------------------------------------------

------------





PATIENT: EDUARDO OVERTON               ACCT #: [id number] LOC:  [facility]       U #

: [id number]


                                       AGE/SX: 77/F         ROOM: [facility]       REG

: 18


REG DR:  Fortino Roberts M.D.          :    1940   BED:  1          DIS

:         


                                       STATUS: ADM IN       TLOC:           


--------------------------------------------------------------------------------

------------








SPEC #: [id number]        SHAHAB:      STATUS:  RES            REQ 

#: [id number]


                            RECD:      SUBM DR: Rosmery Pastor 

D.OKristy        


SOURCE: BLOOD               ENTR:      OTHR DR: Rey Jensen MD    

            


[facility]:                                                      Ayaz Trejo, 

Davdi Gaston

, Cata Dominguez M.D. Peterson, Emily A., MD


ORDERED:  BLOOD CULTURE                                                        

   


--------------------------------------------------------------------------------

------------





  Procedure                         Result                         Verified    

       Site


--------------------------------------------------------------------------------

------------





  BLD CULT  Preliminary                                            


        NO GROWTH TO DATE.                                     





--------------------------------------------------------------------------------

------------


















































[~ rep ct add3]]


CHEST ONE VIEW PORTABLE





CLINICAL HISTORY: fever    





COMPARISON STUDY:  3/20/2018





FINDINGS: The heart is mildly enlarged. There is mild vascular prominence


without evidence of overt failure. There is no lobar consolidation. There are


mild basilar atelectatic changes. There are no pleural effusions. There are


calcified pleural plaques.[ 





IMPRESSION: 


1. Cardiomegaly with mild vascular prominence but no evidence of overt failure


2. Calcified pleural plaques


3. No evidence of focal pulmonary consolidation











Electronically signed by:  John Boyce M.D.


3/26/2018 7:37 PM





Dictated Date/Time:  3/26/2018 7:36 PM





 The status of this report is Signed.   


 Draft = Not yet reviewed or approved by Radiologist.  


 Signed = Reviewed and approved by Radiologist.


<AttendingPhy>Rosmery Pastor D.O.</AttendingPhy> <FamilyPhy></FamilyPhy> <

PrimaryPhy>Cata Lozada M.D.</PrimaryPhy> <Unit





Assessment and Plan


Facial/periorbital cellulitis, with clinical response to Abx, now with rash 

most c/w drug eruption. Unlikely from clindamycin, but given improvement and 

length of prior therapy, I think that clindamycin can be discontinued. Await 

skin biopsy results.

## 2018-03-29 NOTE — PROGRESS NOTE
Medicine Progress Note


Date & Time of Visit:


Mar 29, 2018 at 15:40


.


Subjective





Persistent itching from rash.


No fever.


No chest pain.


No cough or shortness of breath.


No nausea, vomiting, diarrhea.


No urinary symptoms.


 visiting.


.





Objective





Last 8 Hrs








  Date Time  Temp Pulse Resp B/P (MAP) Pulse Ox O2 Delivery O2 Flow Rate FiO2


 


3/29/18 15:14 36.4 62 20 137/76 (96) 96 Room Air  


 


3/29/18 11:53  60  136/76 (96)    








Physical Exam:





General-lying in bed, no distress


Lungs- clear to auscultation; no respiratory distress


Cardiovascular- RRR; no gallop; no JVD; trace pretibial edema


Abdomen- + bowel sounds, soft, nontender 


Extremities- no cyanosis; no calf tenderness 


Neuro- alert, oriented


Skin- extensive maculopapular rash involving face, trunk, extremities; 

coalescing


.





Assessment & Plan





PROBABLE SEPSIS / FACIAL CELLULITIS


Presented with facial cellulitis associated with fever, tachycardia, 

leukocytosis, elevated lactate.


No apparent abscess per CT.  Blood cultures obtained have remained negative.


Initially received intravenous vancomycin and piperacillin/tazobactam.


Those antibiotics discontinued because of rash.


Now receiving clindamycin.


Afebrile.





ELEVATED CARDIAC MARKERS


Seen in consultation by Cardiology.


History of nonocclusive coronary artery disease and chronic left bundle branch 

block.


Echocardiogram on 3/17/18 demonstrated diffuse hypokinesis, LVEF of 30-35%.


Repeat echocardiogram on 3/26/18 demonstrated moderate concentric LVH, LVEF 65-

70%, mild aortic valve sclerosis without stenosis, calcification of mitral 

valve annulus with mild mitral insufficiency.


Acute coronary syndrome felt to be unlikely.


Further evaluation recommended as outpatient, Lexiscan stress test versus 

diagnostic catheterization.





HYPERTENSION


Currently receiving metoprolol and felodipine.


Follow and titrate therapy.





SLEEP APNEA 


Continue CPAP.





RASH


Extensive maculopapular rash.


Probable drug reaction; culprit uncertain, possibly due to piperacillin/

tazobactam but receiving several other medications as well.


Dermatology consulted.


Skin biopsy performed on 3/27; pathology- spongiotic dermatitis with features 

of spongiotic drug reaction.


Continue steroids and antihistamines.





DISPOSITION


Expected discharge to home.


Internal Medicine follow-up with Dr. Cata Lozada.


.


Current Inpatient Medications:





Current Inpatient Medications








 Medications


  (Trade)  Dose


 Ordered  Sig/Sirisha


 Route  Start Time


 Stop Time Status Last Admin


Dose Admin


 


 Al Hydrox/Mg


 Hydrox/Simethicone


  (Maalox Max Susp)  15 ml  Q4H  PRN


 PO  3/17/18 13:15


 4/16/18 13:14   


 


 


 Magnesium


 Hydroxide


  (Milk Of


 Magnesia Susp)  30 ml  Q12H  PRN


 PO  3/17/18 13:15


 4/16/18 13:14   


 


 


 Ondansetron HCl


  (Zofran Inj)  4 mg  Q6H  PRN


 IV  3/17/18 13:15


 4/16/18 13:14   


 


 


 Albuterol


  (Ventolin Hfa


 Inhaler)  2 puffs  QID


 INH  3/17/18 17:00


 4/16/18 16:59  3/29/18 16:34


2 PUFFS


 


 Aspirin


  (Ecotrin Tab)  81 mg  DAILY


 PO  3/18/18 09:00


 4/17/18 08:59  3/29/18 08:45


81 MG


 


 Atorvastatin


 Calcium


  (Lipitor Tab)  40 mg  DAILY


 PO  3/18/18 09:00


 4/17/18 08:59 Future Hold 3/19/18 08:24


40 MG


 


 Felodipine


  (Plendil Tabcr)  5 mg  QAM


 PO  3/18/18 09:00


 4/17/18 08:59  3/29/18 08:44


5 MG


 


 Sodium Chloride


  (Ocean Nasal


 Spray)  2 sprays  UD  PRN


 NA  3/17/18 13:45


 4/16/18 13:44   


 


 


 Magnesium Oxide


  (Mag-Ox Tab)  200 mg  DAILY


 PO  3/18/18 09:00


 4/17/18 08:59  3/29/18 08:46


200 MG


 


 Pantoprazole


 Sodium


  (Protonix Tab)  40 mg  QAM


 PO  3/18/18 09:00


 4/17/18 08:59  3/29/18 08:45


40 MG


 


 Albuterol/


 Ipratropium


  (Duoneb)  3 ml  QIDR


 INH  3/17/18 16:00


 4/16/18 15:59  3/17/18 19:23


3 ML


 


 Furosemide 20 mg/


 Syringe  2 ml @ 4


 mls/min  DAILY


 IV  3/19/18 09:00


 4/18/18 08:59 Future Hold 3/19/18 08:23


4 MLS/MIN


 


 Lisinopril


  (Zestril Tab)  10 mg  QAM


 PO  3/19/18 09:00


 4/18/18 08:59 Future Hold 3/19/18 08:24


10 MG


 


 Diphenhydramine


 HCl


  (Benadryl Inj)  50 mg  BID


 IV.  3/19/18 21:00


 4/18/18 20:59 Future Hold 3/19/18 20:53


50 MG


 


 Heparin Sodium


  (Porcine)


  (Heparin Sq 5000


 Unit/0.5ml)  5,000 unit  Q8


 SQ  3/19/18 14:00


 4/18/18 13:59  3/29/18 14:11


5,000 UNIT


 


 Acetaminophen


  (Tylenol Tab)  325 mg  Q6H  PRN


 PO  3/20/18 05:00


 4/16/18 13:14  3/27/18 19:56


325 MG


 


 Isosorbide


 Dinitrate


  (Isordil Tab)  10 mg  TID@0700,1200,1700


 PO  3/23/18 12:00


 4/20/18 11:59  3/29/18 16:38


10 MG


 


 Terazosin HCl


  (Hytrin Cap)  2 mg  HS


 PO  3/23/18 21:00


 4/22/18 20:59  3/28/18 21:00


2 MG


 


 Diphenhydramine


 HCl


  (Benadryl Extra


 Strength Cream)  1 appln  Q8  PRN


 EXT  3/25/18 16:45


 4/24/18 16:44  3/27/18 15:40


1 APPLN


 


 Metoprolol


 Succinate


  (Toprol Xl Tab)  37.5 mg  BID


 PO  3/26/18 20:00


 4/18/18 08:59  3/29/18 08:45


37.5 MG


 


 Diphenhydramine


 HCl


  (Benadryl Cap)  25 mg  Q6H  PRN


 PO  3/27/18 21:15


 4/26/18 21:14  3/29/18 08:48


25 MG


 


 Diphenhydramine


 HCl


  (Benadryl Cap)  50 mg  HS


 PO  3/28/18 21:00


 4/27/18 20:59  3/28/18 21:00


50 MG


 


 Cetirizine HCl


  (zyrTEC TAB)  10 mg  DAILY


 PO  3/29/18 08:00


 4/16/18 13:44  3/29/18 08:43


10 MG


 


 Prednisone


  (PredniSONE TAB)  40 mg  DAILY


 PO  3/29/18 08:00


 4/28/18 07:59  3/29/18 08:44


40 MG


 


 Ranitidine HCl


  (zANTac TAB)  150 mg  BID


 PO  3/29/18 08:00


 4/28/18 07:59  3/29/18 08:44


150 MG


 


 Multi-Ingredient


 Ointment


  (Eucerin


 Unscented Cr)  1 appln  PRN  PRN


 EXT  3/29/18 14:15


 4/28/18 14:14

## 2018-03-29 NOTE — PROGRESS NOTE
Medicine Progress Note


Date & Time of Visit:


Mar 28, 2018 at 1515


.


Subjective





Late entry secondary to computer downtime.





Rash no worse today.


Got some rest last night after taking some diphenhydramine.


No fever.


No chest pain.


No cough or shortness of breath.


No nausea, vomiting, diarrhea.


.





Objective





Vital signs this morning at 0730: temperature 37.2, pulse 75, respirations 18, 

blood pressure 136/68


.


Physical Exam:





General-sitting in chair, no distress


Lungs- clear to auscultation; no respiratory distress


Cardiovascular- RRR; no gallop; no JVD; trace pretibial edema


Abdomen- + bowel sounds, soft, nontender 


Extremities- no cyanosis; no calf tenderness 


Neuro- alert, oriented


Skin- extensive maculopapular rash involving face, trunk, extremities


.


Laboratory Results:





Last 24 Hours








Test


  3/28/18


06:17


 


Sodium Level 134 mmol/L 


 


Potassium Level 4.6 mmol/L 


 


Chloride Level 103 mmol/L 


 


Carbon Dioxide Level 25 mmol/L 


 


Anion Gap 6.0 mmol/L 


 


Blood Urea Nitrogen 29 mg/dl 


 


Creatinine 1.09 mg/dl 


 


Est Creatinine Clear Calc


Drug Dose 40.3 ml/min 


 


 


Estimated GFR (


American) 56.3 


 


 


Estimated GFR (Non-


American 48.6 


 


 


BUN/Creatinine Ratio 26.7 


 


Random Glucose 141 mg/dl 


 


Calcium Level 8.8 mg/dl 











Assessment & Plan





PROBABLE SEPSIS / FACIAL CELLULITIS


Presented with facial cellulitis associated with fever, tachycardia, 

leukocytosis, elevated lactate.


No apparent abscess per CT.  Blood cultures obtained have remained negative.


Initially received intravenous vancomycin and piperacillin/tazobactam.


Those antibiotics discontinued because of rash.


Now receiving clindamycin.


Afebrile.





ELEVATED CARDIAC MARKERS


Seen in consultation by Cardiology.


History of nonocclusive coronary artery disease and chronic left bundle branch 

block.


Echocardiogram on 3/17/18 demonstrated diffuse hypokinesis, LVEF of 30-35%.


Repeat echocardiogram on 3/26/18 demonstrated moderate concentric LVH, LVEF 65-

70%, mild aortic valve sclerosis without stenosis, calcification of mitral 

valve annulus with mild mitral insufficiency.


Acute coronary syndrome felt to be unlikely.


Further evaluation recommended as outpatient, Lexiscan stress test versus 

diagnostic catheterization.





HYPERTENSION


Currently receiving metoprolol and felodipine.


Follow and titrate therapy.





SLEEP APNEA 


Continue CPAP.





RASH


Extensive maculopapular rash.


Probable drug reaction; culprit uncertain, possibly due to piperacillin/

tazobactam but receiving several other medications as well.


Dermatology consult.


Continue steroids and antihistamines.


Skin biopsy performed on 3/27-results pending.





DISPOSITION


Expected discharge to home.


Internal Medicine follow-up with Dr. Cata Lozada.


.


Current Inpatient Medications:





Current Inpatient Medications








 Medications


  (Trade)  Dose


 Ordered  Sig/Sirisha


 Route  Start Time


 Stop Time Status Last Admin


Dose Admin


 


 Al Hydrox/Mg


 Hydrox/Simethicone


  (Maalox Max Susp)  15 ml  Q4H  PRN


 PO  3/17/18 13:15


 4/16/18 13:14   


 


 


 Magnesium


 Hydroxide


  (Milk Of


 Magnesia Susp)  30 ml  Q12H  PRN


 PO  3/17/18 13:15


 4/16/18 13:14   


 


 


 Ondansetron HCl


  (Zofran Inj)  4 mg  Q6H  PRN


 IV  3/17/18 13:15


 4/16/18 13:14   


 


 


 Albuterol


  (Ventolin Hfa


 Inhaler)  2 puffs  QID


 INH  3/17/18 17:00


 4/16/18 16:59  3/28/18 20:00


2 PUFFS


 


 Aspirin


  (Ecotrin Tab)  81 mg  DAILY


 PO  3/18/18 09:00


 4/17/18 08:59  3/28/18 08:33


81 MG


 


 Atorvastatin


 Calcium


  (Lipitor Tab)  40 mg  DAILY


 PO  3/18/18 09:00


 4/17/18 08:59 Future Hold 3/19/18 08:24


40 MG


 


 Cetirizine HCl


  (zyrTEC TAB)  10 mg  DAILY  PRN


 PO  3/17/18 13:45


 4/16/18 13:44  3/29/18 01:28


10 MG


 


 Felodipine


  (Plendil Tabcr)  5 mg  QAM


 PO  3/18/18 09:00


 4/17/18 08:59  3/28/18 08:37


5 MG


 


 Sodium Chloride


  (Ocean Nasal


 Spray)  2 sprays  UD  PRN


 NA  3/17/18 13:45


 4/16/18 13:44   


 


 


 Magnesium Oxide


  (Mag-Ox Tab)  200 mg  DAILY


 PO  3/18/18 09:00


 4/17/18 08:59  3/28/18 08:34


200 MG


 


 Pantoprazole


 Sodium


  (Protonix Tab)  40 mg  QAM


 PO  3/18/18 09:00


 4/17/18 08:59  3/28/18 08:38


40 MG


 


 Albuterol/


 Ipratropium


  (Duoneb)  3 ml  QIDR


 INH  3/17/18 16:00


 4/16/18 15:59  3/17/18 19:23


3 ML


 


 Famotidine 20 mg/


 Syringe  5 ml @ 2.5


 mls/min  Q12H


 IV  3/18/18 09:00


 4/17/18 08:59  3/28/18 21:00


2.5 MLS/MIN


 


 Furosemide 20 mg/


 Syringe  2 ml @ 4


 mls/min  DAILY


 IV  3/19/18 09:00


 4/18/18 08:59 Future Hold 3/19/18 08:23


4 MLS/MIN


 


 Lisinopril


  (Zestril Tab)  10 mg  QAM


 PO  3/19/18 09:00


 4/18/18 08:59 Future Hold 3/19/18 08:24


10 MG


 


 Diphenhydramine


 HCl


  (Benadryl Inj)  50 mg  BID


 IV.  3/19/18 21:00


 4/18/18 20:59 Future Hold 3/19/18 20:53


50 MG


 


 Heparin Sodium


  (Porcine)


  (Heparin Sq 5000


 Unit/0.5ml)  5,000 unit  Q8


 SQ  3/19/18 14:00


 4/18/18 13:59  3/28/18 14:09


5,000 UNIT


 


 Acetaminophen


  (Tylenol Tab)  325 mg  Q6H  PRN


 PO  3/20/18 05:00


 4/16/18 13:14  3/27/18 19:56


325 MG


 


 Isosorbide


 Dinitrate


  (Isordil Tab)  10 mg  TID@0700,1200,1700


 PO  3/23/18 12:00


 4/20/18 11:59  3/28/18 17:29


10 MG


 


 Terazosin HCl


  (Hytrin Cap)  2 mg  HS


 PO  3/23/18 21:00


 4/22/18 20:59  3/28/18 21:00


2 MG


 


 Diphenhydramine


 HCl


  (Benadryl Extra


 Strength Cream)  1 appln  Q8  PRN


 EXT  3/25/18 16:45


 4/24/18 16:44  3/27/18 15:40


1 APPLN


 


 Metoprolol


 Succinate


  (Toprol Xl Tab)  37.5 mg  BID


 PO  3/26/18 20:00


 4/18/18 08:59  3/28/18 20:00


37.5 MG


 


 Clindamycin HCl


  (Cleocin Cap)  300 mg  Q8


 PO  3/26/18 22:00


 4/5/18 21:59  3/28/18 22:00


300 MG


 


 Diphenhydramine


 HCl


  (Benadryl Cap)  25 mg  Q6H  PRN


 PO  3/27/18 21:15


 4/26/18 21:14  3/28/18 09:19


25 MG


 


 Diphenhydramine


 HCl


  (Benadryl Cap)  50 mg  HS


 PO  3/28/18 21:00


 4/27/18 20:59  3/28/18 21:00


50 MG

## 2018-03-30 VITALS
DIASTOLIC BLOOD PRESSURE: 74 MMHG | TEMPERATURE: 97.88 F | HEART RATE: 66 BPM | SYSTOLIC BLOOD PRESSURE: 146 MMHG | OXYGEN SATURATION: 94 %

## 2018-03-30 VITALS
OXYGEN SATURATION: 94 % | DIASTOLIC BLOOD PRESSURE: 74 MMHG | TEMPERATURE: 97.88 F | SYSTOLIC BLOOD PRESSURE: 146 MMHG | HEART RATE: 66 BPM

## 2018-03-30 VITALS
OXYGEN SATURATION: 92 % | DIASTOLIC BLOOD PRESSURE: 90 MMHG | SYSTOLIC BLOOD PRESSURE: 153 MMHG | TEMPERATURE: 97.88 F | HEART RATE: 57 BPM

## 2018-03-30 VITALS — OXYGEN SATURATION: 99 %

## 2018-03-30 VITALS — HEART RATE: 62 BPM

## 2018-03-30 LAB
BUN SERPL-MCNC: 32 MG/DL (ref 7–18)
CALCIUM SERPL-MCNC: 8.9 MG/DL (ref 8.5–10.1)
CO2 SERPL-SCNC: 29 MMOL/L (ref 21–32)
CREAT SERPL-MCNC: 1.02 MG/DL (ref 0.6–1.2)
EOSINOPHIL NFR BLD AUTO: 245 K/UL (ref 130–400)
GLUCOSE SERPL-MCNC: 85 MG/DL (ref 70–99)
HCT VFR BLD CALC: 32.7 % (ref 37–47)
HGB BLD-MCNC: 10.9 G/DL (ref 12–16)
MCH RBC QN AUTO: 29.2 PG (ref 25–34)
MCHC RBC AUTO-ENTMCNC: 33.3 G/DL (ref 32–36)
MCV RBC AUTO: 87.7 FL (ref 80–100)
PMV BLD AUTO: 10.3 FL (ref 7.4–10.4)
POTASSIUM SERPL-SCNC: 4.4 MMOL/L (ref 3.5–5.1)
RED CELL DISTRIBUTION WIDTH CV: 15.7 % (ref 11.5–14.5)
RED CELL DISTRIBUTION WIDTH SD: 50.4 FL (ref 36.4–46.3)
SODIUM SERPL-SCNC: 137 MMOL/L (ref 136–145)
WBC # BLD AUTO: 13.18 K/UL (ref 4.8–10.8)

## 2018-03-30 RX ADMIN — FELODIPINE SCH MG: 5 TABLET, FILM COATED, EXTENDED RELEASE ORAL at 08:58

## 2018-03-30 RX ADMIN — PANTOPRAZOLE SCH MG: 40 TABLET, DELAYED RELEASE ORAL at 08:59

## 2018-03-30 RX ADMIN — METOPROLOL SUCCINATE SCH MG: 25 TABLET, EXTENDED RELEASE ORAL at 08:59

## 2018-03-30 RX ADMIN — Medication SCH MG: at 09:00

## 2018-03-30 RX ADMIN — HEPARIN SODIUM SCH UNIT: 10000 INJECTION, SOLUTION INTRAVENOUS; SUBCUTANEOUS at 06:21

## 2018-03-30 RX ADMIN — CETIRIZINE HYDROCHLORIDE SCH MG: 10 TABLET, FILM COATED ORAL at 09:00

## 2018-03-30 RX ADMIN — ALBUTEROL SULFATE SCH PUFFS: 90 AEROSOL, METERED RESPIRATORY (INHALATION) at 08:58

## 2018-03-30 RX ADMIN — RANITIDINE SCH MG: 150 TABLET ORAL at 08:59

## 2018-03-30 RX ADMIN — DIPHENHYDRAMINE HYDROCHLORIDE AND ZINC ACETATE PRN APPLN: 20; 1 CREAM TOPICAL at 09:03

## 2018-03-30 RX ADMIN — ISOSORBIDE DINITRATE SCH MG: 5 TABLET ORAL at 13:29

## 2018-03-30 RX ADMIN — HEPARIN SODIUM SCH UNIT: 10000 INJECTION, SOLUTION INTRAVENOUS; SUBCUTANEOUS at 13:28

## 2018-03-30 RX ADMIN — ALBUTEROL SULFATE SCH PUFFS: 90 AEROSOL, METERED RESPIRATORY (INHALATION) at 13:29

## 2018-03-30 RX ADMIN — ISOSORBIDE DINITRATE SCH MG: 5 TABLET ORAL at 06:23

## 2018-03-30 NOTE — DISCHARGE INSTRUCTIONS
Discharge Instructions


Date of Service


Mar 30, 2018.





Admission


Reason for Admission:  cellulitis of face


.





Discharge


Discharge Diagnosis / Problem:   cellulitis of face, rash





Discharge Goals


Goal(s):  Decrease discomfort, Improve disease control





Activity Recommendations


Activity Limitations:  as noted below


Lifting Limitations:  gradually increase as tolerated





.





Instructions / Follow-Up


Instructions / Follow-Up





APPOINTMENTS:





INTERNAL MEDICINE


4/3/2018 2:40 PM 


Cata Lozada MD





CARDIOLOGY


4/11/2018 11:00 AM 


Mark S Lombardo, PA-C





OPHTHALMOLOGY


Please call the office and make an appointment for next week.





DERMATOLOGY


As needed.








OTHER INSTRUCTIONS:





Not sure what caused the rash.


Most likely and allergy due to 1 of these antibiotics:


   vancomycin       (Vancocin)


   piperacillin / tazobactam      (Zosyn)


You should avoid these medications in the future.


Zosyn is related to penicillin, so you should avoid related medications as well.





Take diphenhydramine (Benadryl) as needed for itching.


2 pills at bedtime will help you sleep.


1 pill every 6 hrs during the day as needed.


No prescription necessary.





Take prednisone for rash.


Taper as instructed:


3 pills (30 mg) daily for 4 days


2 pills (20 mg) daily for 4 days


1 pill   (10 mg) daily for 4 days


then stop





Use moisturizing creams like Eucerin for dry / peeling skin. 





Some of your medications for heart disease, high blood pressure, and 

cholesterol were changed or stopped.


Review medication instructions closely.


(Some of these medications may be restarted in the future, so don't throw them 

away.)





Seek medical attention if you have:


*  temperature above 101


*  chest pain or trouble breathing


*  abdominal pain, nausea, vomiting


*  diarrhea, dark stools or bloody stools


*  any unanswered questions or concerns





Call 911 if symptoms are severe.





Call if you have any questions or problems.


My cell # is [phone].





You can also reach a [facility] hospitalist on duty at [facility] 24 hours a day by calling [phone].





Please take good care of yourself.





Fortino Roberts


.





Current Hospital Diet


Patient's current hospital diet: AHA Diet (Heart Healthy)





Discharge Diet


Recommended Diet:  AHA Diet (Heart Healthy)





Procedures


Procedures Performed:  


CT scan of head and neck- no apparent stroke, fractures, or abscess


echocardiogram- some weakening of heart muscle


skin biopsy- allergic reaction





Pending Studies


Studies pending at discharge:  no





Medical Emergencies








.


Who to Call and When:





Medical Emergencies:  If at any time you feel your situation is an emergency, 

please call 911 immediately.





.





Non-Emergent Contact


Non-Emergency issues call your:  Primary Care Provider, Cardiologist, Hospital 

Doctor





.


.








"Provider Documentation" section prepared by Fortino Roberts.








.

## 2018-03-30 NOTE — PROGRESS NOTE
Medicine Progress Note


Date & Time of Visit:


Mar 30, 2018 at 15:15


.


Subjective





Doing well.


Rash improved.


No fever, chest pain, cough, shortness of breath, nausea, vomiting, diarrhea.


Transferring with contact guard.


Ambulated more than 300 feet with walker yesterday.


.





Objective





Last 8 Hrs








  Date Time  Temp Pulse Resp B/P (MAP) Pulse Ox O2 Delivery O2 Flow Rate FiO2


 


3/30/18 14:49 36.6 66 18  94 Room Air  


 


3/30/18 14:39 36.6 66 18 146/74 (98) 94   


 


3/30/18 09:00  62      


 


3/30/18 09:00      Room Air  


 


3/30/18 07:46 36.6 57 18 153/90 (111) 92 Room Air  








Physical Exam:





General- lying in bed, no distress


Lungs- clear to auscultation; no respiratory distress


Cardiovascular- RRR; no gallop; no JVD; trace pretibial edema


Abdomen- + bowel sounds, soft, nontender 


Extremities- no cyanosis; no calf tenderness 


Neuro- alert, oriented


Skin- maculopapular rash involving face, trunk, extremities; erythema less 

intense


.


Laboratory Results:





Last 24 Hours








Test


  3/30/18


07:16


 


White Blood Count 13.18 K/uL 


 


Red Blood Count 3.73 M/uL 


 


Hemoglobin 10.9 g/dL 


 


Hematocrit 32.7 % 


 


Mean Corpuscular Volume 87.7 fL 


 


Mean Corpuscular Hemoglobin 29.2 pg 


 


Mean Corpuscular Hemoglobin


Concent 33.3 g/dl 


 


 


RDW Standard Deviation 50.4 fL 


 


RDW Coefficient of Variation 15.7 % 


 


Platelet Count 245 K/uL 


 


Mean Platelet Volume 10.3 fL 


 


Sodium Level 137 mmol/L 


 


Potassium Level 4.4 mmol/L 


 


Chloride Level 104 mmol/L 


 


Carbon Dioxide Level 29 mmol/L 


 


Anion Gap 4.0 mmol/L 


 


Blood Urea Nitrogen 32 mg/dl 


 


Creatinine 1.02 mg/dl 


 


Est Creatinine Clear Calc


Drug Dose 43.0 ml/min 


 


 


Estimated GFR (


American) 61.0 


 


 


Estimated GFR (Non-


American 52.6 


 


 


BUN/Creatinine Ratio 31.1 


 


Random Glucose 85 mg/dl 


 


Calcium Level 8.9 mg/dl 


 


Total Creatine Kinase 47 U/L 











Assessment & Plan





PROBABLE SEPSIS / FACIAL CELLULITIS


Presented with facial cellulitis associated with fever, tachycardia, 

leukocytosis, elevated lactate.


No apparent abscess per CT.  Blood cultures obtained have remained negative.


Initially received intravenous vancomycin and piperacillin/tazobactam.


Those antibiotics discontinued because of rash.


Therapy changed to oral clindamycin to complete course of antibiotics.





ELEVATED CARDIAC MARKERS


Seen in consultation by Cardiology.


History of nonocclusive coronary artery disease and chronic left bundle branch 

block.


Echocardiogram on 3/17/18 demonstrated diffuse hypokinesis, LVEF of 30-35%.


Repeat echocardiogram on 3/26/18 demonstrated moderate concentric LVH, LVEF 65-

70%, mild aortic valve sclerosis without stenosis, calcification of mitral 

valve annulus with mild mitral insufficiency.


Acute coronary syndrome felt to be unlikely.


Further evaluation recommended as outpatient, Lexiscan stress test versus 

diagnostic catheterization.





HYPERTENSION


Currently receiving metoprolol and felodipine.


Follow and titrate therapy.





SLEEP APNEA 


Continue CPAP.





RASH


Extensive maculopapular rash.


Probable drug reaction; culprit uncertain, possibly due to piperacillin/

tazobactam but receiving several other medications as well.


Dermatology consulted.


Skin biopsy performed on 3/27; pathology- spongiotic dermatitis with features 

of spongiotic drug reaction.


Discharged on steroid taper and antihistamines.





DISPOSITION


Case Management following patient.


Skilled care discussed.


Patient prefers to be discharged home with outpatient PT.


Internal Medicine follow-up with Dr. Cata Lozada.


.


Current Inpatient Medications:





Current Inpatient Medications








 Medications


  (Trade)  Dose


 Ordered  Sig/Sirisha


 Route  Start Time


 Stop Time Status Last Admin


Dose Admin


 


 Al Hydrox/Mg


 Hydrox/Simethicone


  (Maalox Max Susp)  15 ml  Q4H  PRN


 PO  3/17/18 13:15


 4/16/18 13:14   


 


 


 Magnesium


 Hydroxide


  (Milk Of


 Magnesia Susp)  30 ml  Q12H  PRN


 PO  3/17/18 13:15


 4/16/18 13:14   


 


 


 Ondansetron HCl


  (Zofran Inj)  4 mg  Q6H  PRN


 IV  3/17/18 13:15


 4/16/18 13:14   


 


 


 Albuterol


  (Ventolin Hfa


 Inhaler)  2 puffs  QID


 INH  3/17/18 17:00


 4/16/18 16:59  3/30/18 13:29


2 PUFFS


 


 Aspirin


  (Ecotrin Tab)  81 mg  DAILY


 PO  3/18/18 09:00


 4/17/18 08:59  3/30/18 09:00


81 MG


 


 Atorvastatin


 Calcium


  (Lipitor Tab)  40 mg  DAILY


 PO  3/18/18 09:00


 4/17/18 08:59 Future Hold 3/19/18 08:24


40 MG


 


 Felodipine


  (Plendil Tabcr)  5 mg  QAM


 PO  3/18/18 09:00


 4/17/18 08:59  3/30/18 08:58


5 MG


 


 Sodium Chloride


  (Ocean Nasal


 Spray)  2 sprays  UD  PRN


 NA  3/17/18 13:45


 4/16/18 13:44   


 


 


 Magnesium Oxide


  (Mag-Ox Tab)  200 mg  DAILY


 PO  3/18/18 09:00


 4/17/18 08:59  3/30/18 09:00


200 MG


 


 Pantoprazole


 Sodium


  (Protonix Tab)  40 mg  QAM


 PO  3/18/18 09:00


 4/17/18 08:59  3/30/18 08:59


40 MG


 


 Albuterol/


 Ipratropium


  (Duoneb)  3 ml  QIDR


 INH  3/17/18 16:00


 4/16/18 15:59  3/17/18 19:23


3 ML


 


 Furosemide 20 mg/


 Syringe  2 ml @ 4


 mls/min  DAILY


 IV  3/19/18 09:00


 4/18/18 08:59 Future Hold 3/19/18 08:23


4 MLS/MIN


 


 Lisinopril


  (Zestril Tab)  10 mg  QAM


 PO  3/19/18 09:00


 4/18/18 08:59 Future Hold 3/19/18 08:24


10 MG


 


 Diphenhydramine


 HCl


  (Benadryl Inj)  50 mg  BID


 IV.  3/19/18 21:00


 4/18/18 20:59 Future Hold 3/19/18 20:53


50 MG


 


 Heparin Sodium


  (Porcine)


  (Heparin Sq 5000


 Unit/0.5ml)  5,000 unit  Q8


 SQ  3/19/18 14:00


 4/18/18 13:59  3/30/18 06:21


5,000 UNIT


 


 Acetaminophen


  (Tylenol Tab)  325 mg  Q6H  PRN


 PO  3/20/18 05:00


 4/16/18 13:14  3/27/18 19:56


325 MG


 


 Isosorbide


 Dinitrate


  (Isordil Tab)  10 mg  TID@0700,1200,1700


 PO  3/23/18 12:00


 4/20/18 11:59  3/30/18 13:29


10 MG


 


 Terazosin HCl


  (Hytrin Cap)  2 mg  HS


 PO  3/23/18 21:00


 4/22/18 20:59  3/29/18 21:07


2 MG


 


 Diphenhydramine


 HCl


  (Benadryl Extra


 Strength Cream)  1 appln  Q8  PRN


 EXT  3/25/18 16:45


 4/24/18 16:44  3/30/18 09:03


1 APPLN


 


 Metoprolol


 Succinate


  (Toprol Xl Tab)  37.5 mg  BID


 PO  3/26/18 20:00


 4/18/18 08:59  3/30/18 08:59


37.5 MG


 


 Diphenhydramine


 HCl


  (Benadryl Cap)  25 mg  Q6H  PRN


 PO  3/27/18 21:15


 4/26/18 21:14  3/30/18 09:02


25 MG


 


 Diphenhydramine


 HCl


  (Benadryl Cap)  50 mg  HS


 PO  3/28/18 21:00


 4/27/18 20:59  3/29/18 21:07


50 MG


 


 Cetirizine HCl


  (zyrTEC TAB)  10 mg  DAILY


 PO  3/29/18 08:00


 4/16/18 13:44  3/30/18 09:00


10 MG


 


 Prednisone


  (PredniSONE TAB)  40 mg  DAILY


 PO  3/29/18 08:00


 4/28/18 07:59  3/30/18 08:59


40 MG


 


 Ranitidine HCl


  (zANTac TAB)  150 mg  BID


 PO  3/29/18 08:00


 4/28/18 07:59  3/30/18 08:59


150 MG


 


 Multi-Ingredient


 Ointment


  (Eucerin


 Unscented Cr)  1 appln  PRN  PRN


 EXT  3/29/18 14:15


 4/28/18 14:14  3/29/18 21:26


1 APPLN

## 2018-04-01 ENCOUNTER — HOSPITAL ENCOUNTER (OUTPATIENT)
Dept: HOSPITAL 45 - C.MSN | Age: 78
Setting detail: OBSERVATION
LOS: 3 days | Discharge: NURSING, REHAB OR LONG-TERM CARE | End: 2018-04-04
Attending: HOSPITALIST | Admitting: HOSPITALIST
Payer: COMMERCIAL

## 2018-04-01 VITALS — HEART RATE: 108 BPM | SYSTOLIC BLOOD PRESSURE: 169 MMHG | TEMPERATURE: 98.42 F | DIASTOLIC BLOOD PRESSURE: 110 MMHG

## 2018-04-01 VITALS
TEMPERATURE: 98.42 F | SYSTOLIC BLOOD PRESSURE: 104 MMHG | OXYGEN SATURATION: 97 % | DIASTOLIC BLOOD PRESSURE: 66 MMHG | HEART RATE: 89 BPM

## 2018-04-01 VITALS
WEIGHT: 167.77 LBS | HEIGHT: 61.5 IN | HEIGHT: 61.5 IN | BODY MASS INDEX: 31.27 KG/M2 | BODY MASS INDEX: 31.27 KG/M2 | WEIGHT: 167.77 LBS

## 2018-04-01 VITALS
OXYGEN SATURATION: 94 % | DIASTOLIC BLOOD PRESSURE: 110 MMHG | TEMPERATURE: 98.42 F | SYSTOLIC BLOOD PRESSURE: 169 MMHG | HEART RATE: 108 BPM

## 2018-04-01 VITALS — SYSTOLIC BLOOD PRESSURE: 181 MMHG | DIASTOLIC BLOOD PRESSURE: 97 MMHG

## 2018-04-01 VITALS — DIASTOLIC BLOOD PRESSURE: 88 MMHG | SYSTOLIC BLOOD PRESSURE: 148 MMHG

## 2018-04-01 DIAGNOSIS — Z88.8: ICD-10-CM

## 2018-04-01 DIAGNOSIS — R60.9: ICD-10-CM

## 2018-04-01 DIAGNOSIS — Z88.2: ICD-10-CM

## 2018-04-01 DIAGNOSIS — I10: ICD-10-CM

## 2018-04-01 DIAGNOSIS — Z79.82: ICD-10-CM

## 2018-04-01 DIAGNOSIS — D72.829: ICD-10-CM

## 2018-04-01 DIAGNOSIS — G47.30: ICD-10-CM

## 2018-04-01 DIAGNOSIS — Z79.899: ICD-10-CM

## 2018-04-01 DIAGNOSIS — Z87.891: ICD-10-CM

## 2018-04-01 DIAGNOSIS — E86.0: ICD-10-CM

## 2018-04-01 DIAGNOSIS — I25.10: ICD-10-CM

## 2018-04-01 DIAGNOSIS — Z88.1: ICD-10-CM

## 2018-04-01 DIAGNOSIS — M10.9: ICD-10-CM

## 2018-04-01 DIAGNOSIS — R21: ICD-10-CM

## 2018-04-01 DIAGNOSIS — Z79.52: ICD-10-CM

## 2018-04-01 DIAGNOSIS — E78.5: ICD-10-CM

## 2018-04-01 DIAGNOSIS — R53.1: Primary | ICD-10-CM

## 2018-04-01 LAB
ALBUMIN SERPL-MCNC: 3.3 GM/DL (ref 3.4–5)
ALP SERPL-CCNC: 117 U/L (ref 45–117)
ALT SERPL-CCNC: 80 U/L (ref 12–78)
AST SERPL-CCNC: 34 U/L (ref 15–37)
BUN SERPL-MCNC: 41 MG/DL (ref 7–18)
CALCIUM SERPL-MCNC: 9.8 MG/DL (ref 8.5–10.1)
CO2 SERPL-SCNC: 24 MMOL/L (ref 21–32)
CREAT SERPL-MCNC: 1.24 MG/DL (ref 0.6–1.2)
EOSINOPHIL NFR BLD AUTO: 322 K/UL (ref 130–400)
GLUCOSE SERPL-MCNC: 148 MG/DL (ref 70–99)
HCT VFR BLD CALC: 39.6 % (ref 37–47)
HGB BLD-MCNC: 13.5 G/DL (ref 12–16)
INR PPP: 1 (ref 0.9–1.1)
MCH RBC QN AUTO: 29.8 PG (ref 25–34)
MCHC RBC AUTO-ENTMCNC: 34.1 G/DL (ref 32–36)
MCV RBC AUTO: 87.4 FL (ref 80–100)
PMV BLD AUTO: 9.9 FL (ref 7.4–10.4)
POTASSIUM SERPL-SCNC: 4.3 MMOL/L (ref 3.5–5.1)
PROT SERPL-MCNC: 8.1 GM/DL (ref 6.4–8.2)
PTT PATIENT: 22.6 SECONDS (ref 21–31)
RED CELL DISTRIBUTION WIDTH CV: 15.7 % (ref 11.5–14.5)
RED CELL DISTRIBUTION WIDTH SD: 49.9 FL (ref 36.4–46.3)
SODIUM SERPL-SCNC: 138 MMOL/L (ref 136–145)
WBC # BLD AUTO: 15.14 K/UL (ref 4.8–10.8)

## 2018-04-01 RX ADMIN — METOPROLOL SUCCINATE SCH MG: 25 TABLET, EXTENDED RELEASE ORAL at 21:11

## 2018-04-01 RX ADMIN — ALLOPURINOL SCH MG: 100 TABLET ORAL at 21:12

## 2018-04-01 RX ADMIN — ISOSORBIDE DINITRATE SCH MG: 5 TABLET ORAL at 17:01

## 2018-04-01 RX ADMIN — ENOXAPARIN SODIUM SCH MG: 40 INJECTION SUBCUTANEOUS at 21:16

## 2018-04-01 NOTE — HISTORY AND PHYSICAL
History & Physical


Date & Time of Service:


Apr 1, 2018 at ~ 15:00


.


Chief Complaint:


generalized weakness


.


Primary Care Physician:


Cata Lozada M.D.


.


History of Present Illness


Source:  patient, family, clinic records, hospital records





77-year-old female followed by Dr. Cata Lozada for Internal Medicine.





History of ischemic heart disease, hypertension, sleep apnea, dyslipidemia, and 

other problems as noted.





Hospitalized at [facility] 3/17/18 with facial cellulitis and 

suspected sepsis.


Prolonged hospital course complicated by rhabdomyolysis, reduced left 

ventricular ejection fraction, extensor rash felt most likely secondary to 

either vancomycin or piperacillin/tazobactam.


She received PT and OT.


By the end of her hospitalization, she was transferring with contact guard and 

ambulating more than 300 feet with a walker.


She is followed by Case Management.


Skilled care was discussed, the patient preferred discharge to home with 

outpatient PT.


Discharge to home on 3/30/18.





Patient called this morning.


Experiencing generalized weakness since returning home.


Unable to rise from chair or toilet by herself.


Her elderly  unable to provide significant assistance.





She feels well except for her generalized weakness.


No fever.


Rash is significantly better.


Continuing prednisone taper as prescribed.


She has noted dependent edema which she attributes to sitting upright in chair 

for prolonged period of time since returning home.


No chest pain, cough, shortness of breath, nausea, vomiting, diarrhea, urinary 

symptoms.


.





Past Medical/Surgical History





Chronic and Resolved Medical Problems:





(1) Coronary artery disease


Status: Chronic  





(2) Dyslipidemia


Status: Chronic  





(3) Gout


Status: Chronic  





(4) Hypertension


Status: Chronic  





(5) Sleep apnea


Permanent Comment: On CPAP


Status: Chronic  








Surgical Problems:





(1) Status post cataract extraction


Status: Chronic  


.








Family History





FATHER


  Parotid cancer


MOTHER


  Hypertension


  Stroke


  Congestive heart failure


  COPD (chronic obstructive pulmonary disease)


DAUGHTER


  Seizures





Social History


Smoking Status:  Former Smoker


Alcohol Use:  occasionally


Marital Status:  


Occupational Status:  retired





Immunizations


History of Influenza Vaccine:  Yes


History of Pneumococcal:  Yes





Allergies


Coded Allergies:  


     Piperacillin (Verified  Allergy, Intermediate, RASH, 4/1/18)


 Developed extensive drug eruption 3/24/18 while receiving


 vancomycin, piperacillin / tazobactam, and other meds.


 Skin Bx 3/27/18 - spongiotic dermatitis with features of


 spongiotic drug reaction.


     Tazobactam (Verified  Allergy, Intermediate, RASH, 4/1/18)


 Developed extensive drug eruption 3/24/18 while receiving


 vancomycin, piperacillin / tazobactam, and other meds.


 Skin Bx 3/27/18 - spongiotic dermatitis with features of


 spongiotic drug reaction.


     Vancomycin (Verified  Allergy, Intermediate, RASH, 4/1/18)


 Developed extensive drug eruption 3/24/18 while receiving


 vancomycin, piperacillin / tazobactam, and other meds.


 Skin Bx 3/27/18 - spongiotic dermatitis with features of


 spongiotic drug reaction.


     Latex1 -Allergic Contact Dermititis (Verified  Allergy, Unknown, SLIGHT 

RASH, 3/17/18)


     Doxycycline (Unverified  Adverse Reaction, Unknown, GI UPSET, 3/17/18)


     Meperidine (Unverified  Adverse Reaction, Unknown, GI UPSET, 3/17/18)


     Propoxyphene (Unverified  Adverse Reaction, Unknown, GI UPSET, 3/17/18)


     Sulfa Antibiotics (Unverified  Adverse Reaction, Unknown, GI UPSET, 3/17/18

)





Home Medications


Scheduled


Albuterol Hfa (Ventolin Hfa), 2 PUFFS INH QID


Allopurinol (Zyloprim), 100 MG PO BID


Aspirin (Aspirin 81), 81 MG PO DAILY


Calcium Carbonate-Vitamin D (Calcium + D), 1 TAB PO DAILY


Clobetasol Propionate (Temovate), 1 APPLN TOP 4XWK


Felodipine (Plendil Er), 5 MG PO QAM


Isosorbide Dinitrate (Isosorbide Dinitrate), 10 MG PO TID@0700,1200,1700


Ketoconazole (Topical) (Ketoconazole), 1 APPLN TOP 2XWK


Magnesium (Magnesium), 1 TAB PO DAILY


Metoprolol Succinate (Metoprolol Succinate ER), 37.5 MG PO BID


Multivitamin (Multivitamin), 1 TAB PO DAILY


Omeprazole (Omeprazole), 1 TAB PO QAM


Prednisone (Prednisone), 0 PO UD


Terazosin Hcl (Hytrin), 2 MG PO QPM





Scheduled PRN


Cetirizine (Zyrtec), 10 MG PO DAILY PRN for Allergic Reaction


Diphenhydramine Hcl (Benadryl), 0 PO UD PRN for itching


Meclizine Hcl (Meclizine Hcl), 25 MG PO UD PRN for Dizziness or Vertigo


Saline (Ocean Nasal [place]), 1 DOSE INTNAS UD PRN for CONGESTION





Review of Systems





As noted above in HPI.


.





Physical Exam


Vital Signs











  Date Time  Temp Pulse Resp B/P (MAP) Pulse Ox O2 Delivery O2 Flow Rate FiO2


 


4/1/18 21:18    148/88 (108)    


 


4/1/18 17:00      Room Air  


 


4/1/18 16:17    181/97 (125)    


 


4/1/18 15:41 36.9 108 18 169/110  Room Air  


 


4/1/18 14:55 36.9 108 18 169/110 (129) 94 Room Air  











CONSTITUTIONAL   


vital signs as noted above


well-developed, well-nourished, no acute distress





EYES      


conjunctivae clear; lids normal


pupils equal and reactive to light





EARS, NOSE, MOUTH AND THROAT


external inspection of ears and nose unremarkable


hearing grossly intact to spoken voice


oropharynx clear; no ulcerations of tongue or oral mucosa





NECK         


no masses; trachea midline


thyroid normal





RESPIRATORY      


normal respiratory effort; no respiratory distress


clear to percussion


clear to auscultation





CARDIOVASCULAR   


regular rate and rhythm


S4 gallop


no murmur


carotid arteries 2/2


pedal pulses intact and symmetric


capillary refill toes < 2 seconds


1+ pretibial edema





GASTROINTESTINAL   


normal bowel sounds, soft, nontender; no palpable masses


no hepatomegaly; no splenomegaly





LYMPHATIC      


no cervical adenopathy





MUSCULOSKELETAL   


no cyanosis; no digital clubbing


no calf tenderness


motor strength extremities grossly intact





SKIN         


resolving erythematous rash involving face, trunk, extremities


warm and dry





NEUROLOGIC      


PERRL, EOMI, no facial palsy, no dysarthria, tongue midline


patellar DTR's 2/2   





PSYCHIATRIC      


oriented to person, place, time


mood and affect appropriate


.





Diagnostics


Laboratory Results





Results Past 24 Hours








Test


  4/1/18


15:06 Range/Units


 


 


White Blood Count 15.14 4.8-10.8  K/uL


 


Red Blood Count 4.53 4.2-5.4  M/uL


 


Hemoglobin 13.5 12.0-16.0  g/dL


 


Hematocrit 39.6 37-47  %


 


Mean Corpuscular Volume 87.4   fL


 


Mean Corpuscular Hemoglobin 29.8 25-34  pg


 


Mean Corpuscular Hemoglobin


Concent 34.1


  32-36  g/dl


 


 


RDW Standard Deviation 49.9 36.4-46.3  fL


 


RDW Coefficient of Variation 15.7 11.5-14.5  %


 


Platelet Count 322 130-400  K/uL


 


Mean Platelet Volume 9.9 7.4-10.4  fL


 


Prothrombin Time


  10.5


  9.0-12.0


SECONDS


 


Prothromb Time International


Ratio 1.0


  0.9-1.1  


 


 


Activated Partial


Thromboplast Time 22.6


  21.0-31.0


SECONDS


 


Partial Thromboplastin Ratio 0.9  


 


Sodium Level 138 136-145  mmol/L


 


Potassium Level 4.3 3.5-5.1  mmol/L


 


Chloride Level 103   mmol/L


 


Carbon Dioxide Level 24 21-32  mmol/L


 


Anion Gap 11.0 3-11  mmol/L


 


Blood Urea Nitrogen 41 7-18  mg/dl


 


Creatinine


  1.24


  0.60-1.20


mg/dl


 


Est Creatinine Clear Calc


Drug Dose 35.2


   ml/min


 


 


Estimated GFR (


American) 48.2


   


 


 


Estimated GFR (Non-


American 41.6


   


 


 


BUN/Creatinine Ratio 32.9 10-20  


 


Random Glucose 148 70-99  mg/dl


 


Calcium Level 9.8 8.5-10.1  mg/dl


 


Total Bilirubin 0.5 0.2-1  mg/dl


 


Aspartate Amino Transf


(AST/SGOT) 34


  15-37  U/L


 


 


Alanine Aminotransferase


(ALT/SGPT) 80


  12-78  U/L


 


 


Alkaline Phosphatase 117   U/L


 


Total Protein 8.1 6.4-8.2  gm/dl


 


Albumin 3.3 3.4-5.0  gm/dl


 


Globulin 4.8 2.5-4.0  gm/dl


 


Albumin/Globulin Ratio 0.7 0.9-2  











Impression


Assessment and Plan





GENERALIZED WEAKNESS


Probably mostly secondary to deconditioning due to recent prolonged illness and 

hospitalization.


Dehydration, steroid therapy may be contributing factors.


PT & OT evaluations requested.





DEHYDRATION


BUN and creatinine 41 and 1.24, respectively.


Increase oral fluid intake.


No IV fluids at this time because of dependent edema.


Follow.





LEUKOCYTOSIS


White count 15,140.


No fever or signs/symptoms of infection.


Leukocytosis most likely secondary to steroid therapy.


Follow.





DEPENDENT EDEMA


Examination reveals 1+ pretibial edema.


Patient had been on diuretic therapy with hydrochlorothiazide prior to her last 

admission which was discontinued because of worsening renal function.


Worsening edema now could be secondary to cessation of diuretic therapy, 

sitting independent position for long periods of time at home, felodipine, 

terazosin.


Creatinine is up a little bit today, so we will opt for elevation and MARYBEL 

stockings rather than diuretic therapy.





RASH


Extensive maculopapular rash.


Probable drug reaction; culprit uncertain, possibly due to piperacillin/

tazobactam but was receiving several other medications at the time as well.


Dermatology consulted.


Skin biopsy performed on 3/27; pathology- spongiotic dermatitis with features 

of spongiotic drug reaction.


Discharged on steroid taper and antihistamines.


Rash is significantly better.


Continue prednisone taper, consider tapering more rapidly because of 

generalized weakness and edema.





CORONARY ARTERY DISEASE


History of nonocclusive coronary artery disease and chronic left bundle branch 

block.


Echocardiogram on 3/17/18 demonstrated diffuse hypokinesis, LVEF of 30-35%.


Repeat echocardiogram on 3/26/18 demonstrated moderate concentric LVH, LVEF 65-

70%, mild aortic valve sclerosis without stenosis, calcification of mitral 

valve annulus with mild mitral insufficiency.


Acute coronary syndrome at that time felt to be unlikely.


Further evaluation recommended as outpatient, Lexiscan stress test versus 

diagnostic catheterization.


Statin on hold due to recent rhabdomyolysis.


Continue aspirin, metoprolol, felodipine, isosorbide dinitrate.





HYPERTENSION


Continue metoprolol, felodipine, nitrates.


Follow and titrate therapy.





SLEEP APNEA 


Continue CPAP.





DYSLIPIDEMIA


Atorvastatin on hold due to recent rhabdomyolysis.





HISTORY OF GOUT


Continue allopurinol.





DISPOSITION


Generalized weakness after recent prolonged illness.


Failed outpatient management.


Unable to perform ADLs independently.


We will need skilled care.


Follow-up PT and OT evaluations requested.


Consult Case Management.


Internal Medicine follow-up with Dr. Caat Lozada.


.





Advanced Directives


Existing Living Will:  No


Existing Power of :  No





Resuscitation Status








VTE Prophylaxis


Will order VTE Prophylaxis:  Yes

## 2018-04-01 NOTE — DISCHARGE SUMMARY
Discharge Summary


Date of Service


Apr 1, 2018.





Discharge Summary


Admission Date:


Mar 17, 2018 at 13:13


Discharge Date:  Mar 30, 2018


Discharge Disposition:  Home


Principal Diagnosis:


facial cellulitis with probable sepsis





OTHER ACUTE / SECONDARY DIAGNOSES


rhabdomyolysis


elevated cardiac markers


reduced LVEF


rash- probable drug reaction to either vancomycin or piperacillin / tazobactam


.


Secondary Diagnoses/Problems:





PAST MEDICAL HISTORY


Coronary artery disease


Hypertension


Sleep apnea on CPAP


Dyslipidemia





PAST SURGICAL HISTORY


Status post cataract surgery


.


Procedures:


Cardiac monitoring


Intravenous fluids


CT head


CT soft tissue neck


CT cervical spine


Echocardiogram


PT


OT


.


Consultations:


Cardiology


Dermatology


.





Medication Reconciliation


New Medications:  


Prednisone (Prednisone) 10 Mg Austyn


0 PO UD, #24 TAB


Taper 30 mg x 4 days, 20 mg x 4 days, 10 mg x 4 days, then stop.


Diphenhydramine Hcl (Benadryl) 25 Mg Cap


0 PO UD PRN for itching, #60 CAP


2 pills at night as needed for severe itching/sleep


 1 pill every 6 hrs during day as needed for severe itching


Isosorbide Dinitrate (Isosorbide Dinitrate) 5 Mg Tab


10 MG PO TID@0700,1200,1700, #90 TAB


Take 3 times a day at 7 a.m., noon, 5 p.m.


Metoprolol Succinate (Metoprolol Succinate ER) 25 Mg Tabcr


37.5 MG PO BID, #90 TAB


Take 1 + 1/2 pill (37.5 mg) twice a day.


 


Continued Medications:  


Albuterol Hfa (Ventolin Hfa) 200 Puffs/52190 Mcg Aers


2 PUFFS INH QID, #1 INHALER





Albuterol Sulf (Ventolin) 2 Mg/5 Ml Syrp








Allopurinol (Zyloprim) 100 Mg Tab


100 MG PO BID





Aspirin (Aspirin 81) 81 Mg Tab


81 MG PO DAILY





Calcium Carbonate-Vitamin D (Calcium + D) 1 Tab Tab


1 TAB PO DAILY





Cetirizine (Zyrtec) 10 Mg Tab


10 MG PO DAILY PRN for Allergic Reaction, TAB





Clobetasol Propionate (Temovate) 0.05 % Oin


1 APPLN TOP 4XWK for 30 Days, #15 GM 1 Refill


APPLY SOLUTION TOPICALLY TO SCALP DAILY MONDAY THROUGH THURSDAY.


Felodipine (Plendil Er) 5 Mg Tab


5 MG PO QAM





Ketoconazole (Topical) (Ketoconazole) 2 % Sha


1 APPLN TOP 2XWK for 30 Days, #120 ML 1 Refill


APPLY TO SCALP 2-3 TIMES PER WEEK


Magnesium (Magnesium) 250 Mg Tab


1 TAB PO DAILY





Meclizine Hcl (Meclizine Hcl) 25 Mg Chw


25 MG PO UD PRN for Dizziness or Vertigo





Multivitamin (Multivitamin)  Tab


1 TAB PO DAILY





Omeprazole (Omeprazole) 20 Mg Tab


1 TAB PO QAM





Saline (Ocean Nasal Spray) 0.65 % Spr


1 DOSE INTNAS UD PRN for CONGESTION





Terazosin Hcl (Hytrin) 2 Mg Cap


2 MG PO QPM





 


Discontinued Medications:  


Atenolol (Tenormin) 25 Mg Tab


12.5 MG PO DAILY





Atorvastatin (Lipitor) 40 Mg Tab


40 MG PO DAILY





Furosemide (Lasix) 20 Mg Tab


20 MG PO 2XWK PRN for FLUID ACCUMULATION, TAB


TAKE MONDAY AND THURSDAY


Hydrochlorothiazide (Hctz) 25 Mg Tab


25 MG PO QAM





Lisinopril (Prinivil) 20 Mg Tab


10 MG PO BID











Admission Information


HPI (per Admitting provider):


This is a 77 year old female with a PMH of CAD, HTN, HLD, THU on nocturnal CPAP

, hx. of vertigo - presents after a fall this morning. States that she woke up 

when it was dark and she reached for the lights and she fell and hit her knees 

and fell to the ground. She has chronic ambulatory dysfunction uses a cane at 

baseline, but tells me when she first got up, she did not have that next to 

her. Her  lives with her and when he came up to the bedroom he tried to 

get her up but she was too weak. She was awake and alert the whole time as per 

; wife states she did not pass out. Patient states that yesterday, her 

face "blew up" and became swollen and red. She does not know why this happened 

as she did not eat anything unusual or have any season or environmental 

allergies. She states that she has had this cellulitis in the past, when it was 

on her face and throughout her body. She states there is no visual loss; denies 

chest pain - has intermittent dyspnea with exertion. Denies palpitations. Has 

some nausea the past day. Denies vomiting or diarrhea. No fevers/chills.


Physical Exam (per Admitting):  


   General Appearance:  no apparent distress


   Head:  normocephalic, atraumatic


   Eyes:  PERRL, EOMI, + pertinent finding (significant periorbital cellulitis; 

swelling. L eye is almost shut; though good EOM; preseptal cellulitis 

throughout facial region)


   Neck:  supple


   Respiratory/Chest:  chest non-tender, lungs clear, normal breath sounds, no 

respiratory distress, no accessory muscle use


   Cardiovascular:  regular rate, rhythm, no edema, no gallop, no JVD, no murmur

, normal peripheral pulses


   Abdomen/GI:  normal bowel sounds, non tender, soft, no organomegaly


   Extremities/Musculoskelatal:  normal inspection, no calf tenderness, normal 

capillary refill, no pedal edema, normal range of motion


   Neurologic/Psych:  CNs II-XII nml as tested, no motor/sensory deficits, alert

, normal mood/affect, oriented x 3


   Skin:  + pertinent finding (see above regarding periorbital cellulitis, 

otherwise, mild cuts and abrasions note)


   Lymphatic:  no adenopathy





Hospital Course





PROBABLE SEPSIS / FACIAL CELLULITIS


Presented with facial cellulitis associated with fever, tachycardia, 

leukocytosis, elevated lactate.


No apparent abscess per CT.  Blood cultures obtained have remained negative.


Initially received intravenous vancomycin and piperacillin/tazobactam.


Those antibiotics discontinued because of rash.


Therapy changed to oral clindamycin to complete course of antibiotics.





ELEVATED CARDIAC MARKERS


Seen in consultation by Cardiology.


History of nonocclusive coronary artery disease and chronic left bundle branch 

block.


Echocardiogram on 3/17/18 demonstrated diffuse hypokinesis, LVEF of 30-35%.


Repeat echocardiogram on 3/26/18 demonstrated moderate concentric LVH, LVEF 65-

70%, mild aortic valve sclerosis without stenosis, calcification of mitral 

valve annulus with mild mitral insufficiency.


Acute coronary syndrome felt to be unlikely.


Further evaluation recommended as outpatient, Lexiscan stress test versus 

diagnostic catheterization.





HYPERTENSION


Currently receiving metoprolol and felodipine.


Follow and titrate therapy.





SLEEP APNEA 


Continue CPAP.





RASH


Extensive maculopapular rash.


Probable drug reaction; culprit uncertain, possibly due to piperacillin/

tazobactam but receiving several other medications as well.


Dermatology consulted.


Skin biopsy performed on 3/27; pathology- spongiotic dermatitis with features 

of spongiotic drug reaction.


Discharged on steroid taper and antihistamines.





RHABDOMYOLYSIS


Total CPK at time of admission was 1296 and nabil as high as 27,000.


Fall, sepsis, statin may be contributing factors.


Follow-up CPK on 3/30/18 was 47.


Continue to hold statin at time of discharge.


Consider restarting statin at a low dose with caution once she has recovered 

from current illness.





ABNORMAL UA


Urinalysis on 3/26/18 showed trace leukocyte esterase, 5-10 WBCs, few hyaline 

casts, many epithelial cells, no bacteria, budding yeast.


Urine culture grew Candida with low colony counts.


No fever or dysuria.


Most likely that yeast secondary to contamination and does not represent a 

urinary tract infection.





DISPOSITION


Case Management following patient.


Skilled care discussed.


Patient transferring with contact guard.


Date prior to discharge she ambulated more than 300 feet with a walker.


Patient preferred to be discharged to home with outpatient PT.


Internal Medicine follow-up with Dr. Cata Lozada.


.


Total time spent on discharge = 40 min.


This includes examination of the patient, discharge planning, medication 

reconciliation, and communication with other providers.


.





Discharge Instructions





Date of Service


Mar 30, 2018.





Admission


Reason for Admission:  cellulitis of face


.





Discharge


Discharge Diagnosis / Problem:   cellulitis of face, rash





Discharge Goals


Goal(s):  Decrease discomfort, Improve disease control





Activity Recommendations


Activity Limitations:  as noted below


Lifting Limitations:  gradually increase as tolerated





.





Instructions / Follow-Up


Instructions / Follow-Up





APPOINTMENTS:





INTERNAL MEDICINE


4/3/2018 2:40 PM 


Cata Lozada MD





CARDIOLOGY


4/11/2018 11:00 AM 


Mark S Lombardo, PA-C





OPHTHALMOLOGY


Please call the office and make an appointment for next week.





DERMATOLOGY


As needed.








OTHER INSTRUCTIONS:





Not sure what caused the rash.


Most likely and allergy due to 1 of these antibiotics:


   vancomycin       (Vancocin)


   piperacillin / tazobactam      (Zosyn)


You should avoid these medications in the future.


Zosyn is related to penicillin, so you should avoid related medications as well.





Take diphenhydramine (Benadryl) as needed for itching.


2 pills at bedtime will help you sleep.


1 pill every 6 hrs during the day as needed.


No prescription necessary.





Take prednisone for rash.


Taper as instructed:


3 pills (30 mg) daily for 4 days


2 pills (20 mg) daily for 4 days


1 pill   (10 mg) daily for 4 days


then stop





Use moisturizing creams like Eucerin for dry / peeling skin. 





Some of your medications for heart disease, high blood pressure, and 

cholesterol were changed or stopped.


Review medication instructions closely.


(Some of these medications may be restarted in the future, so don't throw them 

away.)





Seek medical attention if you have:


*  temperature above 101


*  chest pain or trouble breathing


*  abdominal pain, nausea, vomiting


*  diarrhea, dark stools or bloody stools


*  any unanswered questions or concerns





Call 911 if symptoms are severe.





Call if you have any questions or problems.


My cell # is [phone].





You can also reach a [facility] hospitalist on duty at [facility] 24 hours a day by calling [phone].





Please take good care of yourself.





Fortino Roberts


.





Current Hospital Diet


Patient's current hospital diet: AHA Diet (Heart Healthy)





Discharge Diet


Recommended Diet:  AHA Diet (Heart Healthy)





Procedures


Procedures Performed:  


CT scan of head and neck- no apparent stroke, fractures, or abscess


echocardiogram- some weakening of heart muscle


skin biopsy- allergic reaction





Pending Studies


Studies pending at discharge:  no





Medical Emergencies








.


Who to Call and When:





Medical Emergencies:  If at any time you feel your situation is an emergency, 

please call 911 immediately.





.





Non-Emergent Contact


Non-Emergency issues call your:  Primary Care Provider, Cardiologist, Hospital 

Doctor





.


.








"Provider Documentation" section prepared by Fortino Roberts.








.

## 2018-04-02 VITALS — SYSTOLIC BLOOD PRESSURE: 160 MMHG | DIASTOLIC BLOOD PRESSURE: 87 MMHG | HEART RATE: 76 BPM

## 2018-04-02 VITALS — HEART RATE: 68 BPM | DIASTOLIC BLOOD PRESSURE: 74 MMHG | SYSTOLIC BLOOD PRESSURE: 134 MMHG

## 2018-04-02 VITALS
SYSTOLIC BLOOD PRESSURE: 113 MMHG | DIASTOLIC BLOOD PRESSURE: 70 MMHG | HEART RATE: 70 BPM | TEMPERATURE: 97.7 F | OXYGEN SATURATION: 94 %

## 2018-04-02 VITALS
SYSTOLIC BLOOD PRESSURE: 130 MMHG | TEMPERATURE: 98.42 F | OXYGEN SATURATION: 92 % | DIASTOLIC BLOOD PRESSURE: 70 MMHG | HEART RATE: 66 BPM

## 2018-04-02 VITALS — OXYGEN SATURATION: 95 %

## 2018-04-02 VITALS — TEMPERATURE: 97.88 F

## 2018-04-02 LAB
BUN SERPL-MCNC: 34 MG/DL (ref 7–18)
CALCIUM SERPL-MCNC: 8.4 MG/DL (ref 8.5–10.1)
CO2 SERPL-SCNC: 28 MMOL/L (ref 21–32)
CREAT SERPL-MCNC: 0.87 MG/DL (ref 0.6–1.2)
EOSINOPHIL NFR BLD AUTO: 207 K/UL (ref 130–400)
GLUCOSE SERPL-MCNC: 83 MG/DL (ref 70–99)
HCT VFR BLD CALC: 31.5 % (ref 37–47)
HGB BLD-MCNC: 10.7 G/DL (ref 12–16)
MCH RBC QN AUTO: 30.1 PG (ref 25–34)
MCHC RBC AUTO-ENTMCNC: 34 G/DL (ref 32–36)
MCV RBC AUTO: 88.5 FL (ref 80–100)
PMV BLD AUTO: 10.1 FL (ref 7.4–10.4)
POTASSIUM SERPL-SCNC: 3.6 MMOL/L (ref 3.5–5.1)
RED CELL DISTRIBUTION WIDTH CV: 15.6 % (ref 11.5–14.5)
RED CELL DISTRIBUTION WIDTH SD: 50 FL (ref 36.4–46.3)
SODIUM SERPL-SCNC: 141 MMOL/L (ref 136–145)
WBC # BLD AUTO: 8.39 K/UL (ref 4.8–10.8)

## 2018-04-02 RX ADMIN — ALLOPURINOL SCH MG: 100 TABLET ORAL at 09:41

## 2018-04-02 RX ADMIN — ALLOPURINOL SCH MG: 100 TABLET ORAL at 21:01

## 2018-04-02 RX ADMIN — METOPROLOL SUCCINATE SCH MG: 25 TABLET, EXTENDED RELEASE ORAL at 09:42

## 2018-04-02 RX ADMIN — Medication SCH TAB: at 09:43

## 2018-04-02 RX ADMIN — Medication SCH MG: at 09:42

## 2018-04-02 RX ADMIN — ISOSORBIDE DINITRATE SCH MG: 5 TABLET ORAL at 16:36

## 2018-04-02 RX ADMIN — ENOXAPARIN SODIUM SCH MG: 40 INJECTION SUBCUTANEOUS at 21:01

## 2018-04-02 RX ADMIN — METOPROLOL SUCCINATE SCH MG: 25 TABLET, EXTENDED RELEASE ORAL at 21:00

## 2018-04-02 RX ADMIN — ISOSORBIDE DINITRATE SCH MG: 5 TABLET ORAL at 06:36

## 2018-04-02 RX ADMIN — ISOSORBIDE DINITRATE SCH MG: 5 TABLET ORAL at 12:13

## 2018-04-02 RX ADMIN — FELODIPINE SCH MG: 5 TABLET, FILM COATED, EXTENDED RELEASE ORAL at 09:43

## 2018-04-02 RX ADMIN — PANTOPRAZOLE SCH MG: 40 TABLET, DELAYED RELEASE ORAL at 09:44

## 2018-04-02 NOTE — PROGRESS NOTE
Medicine Progress Note


Date & Time of Visit:


Apr 2, 2018 at 16:15


.


Subjective





Feels better today.


Evaluated by PT and OT.


Still needs assistance with ADLs.


No fever.


No chest pain.


No cough or shortness of breath.


No nausea, vomiting, diarrhea.


Rash continues to improve.


.





Objective





Last 8 Hrs








  Date Time  Temp Pulse Resp B/P (MAP) Pulse Ox O2 Delivery O2 Flow Rate FiO2


 


4/2/18 20:58  68  134/74 (94)    


 


4/2/18 16:45      Room Air  


 


4/2/18 16:14 36.5 70 18 113/70 (84) 94 Room Air  








Physical Exam:





General- lying in bed, no distress


Lungs- clear to auscultation; no respiratory distress


Cardiovascular- RRR; no gallop; no JVD; 1+ pretibial edema


Abdomen- + bowel sounds, soft, nontender 


Extremities- no cyanosis; no calf tenderness 


Neuro- alert, oriented


Skin- warm & dry; resolving rash with less erythema


.


Laboratory Results:





Last 24 Hours








Test


  4/2/18


06:22


 


White Blood Count 8.39 K/uL 


 


Red Blood Count 3.56 M/uL 


 


Hemoglobin 10.7 g/dL 


 


Hematocrit 31.5 % 


 


Mean Corpuscular Volume 88.5 fL 


 


Mean Corpuscular Hemoglobin 30.1 pg 


 


Mean Corpuscular Hemoglobin


Concent 34.0 g/dl 


 


 


RDW Standard Deviation 50.0 fL 


 


RDW Coefficient of Variation 15.6 % 


 


Platelet Count 207 K/uL 


 


Mean Platelet Volume 10.1 fL 


 


Sodium Level 141 mmol/L 


 


Potassium Level 3.6 mmol/L 


 


Chloride Level 104 mmol/L 


 


Carbon Dioxide Level 28 mmol/L 


 


Anion Gap 9.0 mmol/L 


 


Blood Urea Nitrogen 34 mg/dl 


 


Creatinine 0.87 mg/dl 


 


Est Creatinine Clear Calc


Drug Dose 50.3 ml/min 


 


 


Estimated GFR (


American) 74.0 


 


 


Estimated GFR (Non-


American 63.8 


 


 


BUN/Creatinine Ratio 39.2 


 


Random Glucose 83 mg/dl 


 


Calcium Level 8.4 mg/dl 











Assessment & Plan





GENERALIZED WEAKNESS


Probably mostly secondary to deconditioning due to recent prolonged illness and 

hospitalization.


Dehydration, steroid therapy may be contributing factors.


PT & OT evaluations requested.





DEHYDRATION


BUN and creatinine on admission 41 and 1.24, respectively.


Increase oral fluid intake.


IV fluids not ordered because of dependent edema.


BUN creatinine today 34 and 0.87, respectively.


Follow.





LEUKOCYTOSIS


White count 15,140 at time of admission.


No fever or signs/symptoms of infection.


Leukocytosis most likely secondary to steroid therapy.


White count today 8390.


Follow.





DEPENDENT EDEMA


Examination reveals 1+ pretibial edema.


Patient had been on diuretic therapy with hydrochlorothiazide prior to her last 

admission which was discontinued because of worsening renal function.


Worsening edema could be secondary to cessation of diuretic therapy, sitting 

independent position for long periods of time at home, felodipine, terazosin.


Creatinine elevated, so opted for elevation and MARYBEL stockings rather than 

diuretic therapy.





RASH


Extensive maculopapular rash.


Probable drug reaction; culprit uncertain, possibly due to piperacillin/

tazobactam but was receiving several other medications at the time as well.


Dermatology consulted.


Skin biopsy performed on 3/27; pathology- spongiotic dermatitis with features 

of spongiotic drug reaction.


Discharged on steroid taper and antihistamines.


Rash is significantly better.


Continue prednisone taper.





CORONARY ARTERY DISEASE


History of nonocclusive coronary artery disease and chronic left bundle branch 

block.


Echocardiogram on 3/17/18 demonstrated diffuse hypokinesis, LVEF of 30-35%.


Repeat echocardiogram on 3/26/18 demonstrated moderate concentric LVH, LVEF 65-

70%, mild aortic valve sclerosis without stenosis, calcification of mitral 

valve annulus with mild mitral insufficiency.


Acute coronary syndrome at that time felt to be unlikely.


Further evaluation recommended as outpatient, Lexiscan stress test versus 

diagnostic catheterization.


Statin on hold due to recent rhabdomyolysis.


Continue aspirin, metoprolol, felodipine, isosorbide dinitrate.





HYPERTENSION


Blood pressure elevated at time of admission, now better.  


Continue metoprolol, felodipine, nitrates.


Follow and titrate therapy.





SLEEP APNEA 


Continue CPAP.





DYSLIPIDEMIA


Atorvastatin on hold due to recent rhabdomyolysis.





HISTORY OF GOUT


Continue allopurinol.





DISPOSITION


Generalized weakness after recent prolonged illness.


Failed outpatient management.


Unable to perform ADLs independently.


Will need skilled care.


PT and OT evaluations requested.


Case Management consulted.


Internal Medicine follow-up with Dr. Cata Lozada.


.


Current Inpatient Medications:





Current Inpatient Medications








 Medications


  (Trade)  Dose


 Ordered  Sig/Sirisha


 Route  Start Time


 Stop Time Status Last Admin


Dose Admin


 


 Enoxaparin Sodium


  (Lovenox Inj)  40 mg  HS


 SQ  4/1/18 21:00


 5/1/18 20:59  4/1/18 21:16


40 MG


 


 Acetaminophen


  (Tylenol Tab)  650 mg  Q4H  PRN


 PO  4/1/18 15:00


 5/1/18 14:59   


 


 


 Magnesium


 Hydroxide


  (Milk Of


 Magnesia Susp)  30 ml  Q6H  PRN


 PO  4/1/18 15:00


 5/1/18 14:59   


 


 


 Miscellaneous


  (Iv Fluids


 Completed)  1 ea  PRN  PRN


 N/A  4/1/18 15:00


 4/1/19 14:59   


 


 


 Allopurinol


  (Zyloprim Tab)  100 mg  BID


 PO  4/1/18 21:00


 5/1/18 20:59  4/2/18 09:41


100 MG


 


 Aspirin


  (Ecotrin Tab)  81 mg  DAILY


 PO  4/2/18 09:00


 5/2/18 08:59  4/2/18 09:42


81 MG


 


 Cetirizine HCl


  (zyrTEC TAB)  10 mg  DAILY  PRN


 PO  4/1/18 15:00


 5/1/18 14:59   


 


 


 Diphenhydramine


 HCl


  (Benadryl Cap)  25 mg  Q6H  PRN


 PO  4/1/18 15:00


 5/1/18 14:59   


 


 


 Felodipine


  (Plendil Tabcr)  5 mg  QAM


 PO  4/2/18 09:00


 5/2/18 08:59  4/2/18 09:43


5 MG


 


 Isosorbide


 Dinitrate


  (Isordil Tab)  10 mg  TID@0700,1200,1700


 PO  4/1/18 17:00


 5/1/18 16:59  4/2/18 16:36


10 MG


 


 Metoprolol


 Succinate


  (Toprol Xl Tab)  37.5 mg  BID


 PO  4/1/18 21:00


 5/1/18 20:59  4/2/18 09:42


37.5 MG


 


 Multivitamins


  (Multivitamin


 Tab)  1 tab  DAILY


 PO  4/2/18 09:00


 5/2/18 08:59  4/2/18 09:43


1 TAB


 


 Sodium Chloride


  (Ocean Nasal


 Spray)  2 sprays  Q4H  PRN


 NA  4/1/18 15:00


 5/1/18 14:59   


 


 


 Terazosin HCl


  (Hytrin Cap)  2 mg  QPM


 PO  4/1/18 21:00


 5/1/18 20:59  4/1/18 21:12


2 MG


 


 Pantoprazole


 Sodium


  (Protonix Tab)  40 mg  QAM


 PO  4/2/18 09:00


 5/2/18 08:59  4/2/18 09:44


40 MG


 


 Prednisone


  (PredniSONE TAB)  10 mg  DAILY


 PO  4/2/18 09:00


 4/3/18 09:01  4/2/18 09:44


10 MG


 


 Prednisone


  (PredniSONE TAB)  20 mg  DAILY


 PO  4/4/18 09:00


 4/7/18 09:01   


 


 


 Prednisone


  (PredniSONE TAB)  10 mg  DAILY


 PO  4/8/18 09:00


 4/11/18 09:01   


 


 


 Albuterol


  (Ventolin Hfa


 Inhaler)  2 puffs  QID  PRN


 INH  4/1/18 20:30


 5/1/18 16:59

## 2018-04-03 VITALS — DIASTOLIC BLOOD PRESSURE: 87 MMHG | HEART RATE: 61 BPM | SYSTOLIC BLOOD PRESSURE: 159 MMHG

## 2018-04-03 VITALS
DIASTOLIC BLOOD PRESSURE: 75 MMHG | TEMPERATURE: 97.88 F | SYSTOLIC BLOOD PRESSURE: 145 MMHG | OXYGEN SATURATION: 94 % | HEART RATE: 63 BPM

## 2018-04-03 VITALS — SYSTOLIC BLOOD PRESSURE: 126 MMHG | DIASTOLIC BLOOD PRESSURE: 75 MMHG | HEART RATE: 63 BPM

## 2018-04-03 VITALS
OXYGEN SATURATION: 97 % | TEMPERATURE: 98.06 F | SYSTOLIC BLOOD PRESSURE: 152 MMHG | DIASTOLIC BLOOD PRESSURE: 72 MMHG | HEART RATE: 60 BPM

## 2018-04-03 VITALS
TEMPERATURE: 98.06 F | DIASTOLIC BLOOD PRESSURE: 77 MMHG | OXYGEN SATURATION: 96 % | HEART RATE: 60 BPM | SYSTOLIC BLOOD PRESSURE: 136 MMHG

## 2018-04-03 VITALS — HEART RATE: 68 BPM | OXYGEN SATURATION: 96 %

## 2018-04-03 LAB
BUN SERPL-MCNC: 31 MG/DL (ref 7–18)
CALCIUM SERPL-MCNC: 8.3 MG/DL (ref 8.5–10.1)
CO2 SERPL-SCNC: 29 MMOL/L (ref 21–32)
CREAT SERPL-MCNC: 0.94 MG/DL (ref 0.6–1.2)
EOSINOPHIL NFR BLD AUTO: 188 K/UL (ref 130–400)
GLUCOSE SERPL-MCNC: 91 MG/DL (ref 70–99)
HCT VFR BLD CALC: 31.4 % (ref 37–47)
HGB BLD-MCNC: 10.3 G/DL (ref 12–16)
MCH RBC QN AUTO: 29.1 PG (ref 25–34)
MCHC RBC AUTO-ENTMCNC: 32.8 G/DL (ref 32–36)
MCV RBC AUTO: 88.7 FL (ref 80–100)
PMV BLD AUTO: 10 FL (ref 7.4–10.4)
POTASSIUM SERPL-SCNC: 3.8 MMOL/L (ref 3.5–5.1)
RED CELL DISTRIBUTION WIDTH CV: 15.6 % (ref 11.5–14.5)
RED CELL DISTRIBUTION WIDTH SD: 50.7 FL (ref 36.4–46.3)
SODIUM SERPL-SCNC: 139 MMOL/L (ref 136–145)
WBC # BLD AUTO: 7.23 K/UL (ref 4.8–10.8)

## 2018-04-03 RX ADMIN — ALLOPURINOL SCH MG: 100 TABLET ORAL at 20:42

## 2018-04-03 RX ADMIN — ALLOPURINOL SCH MG: 100 TABLET ORAL at 09:15

## 2018-04-03 RX ADMIN — FELODIPINE SCH MG: 5 TABLET, FILM COATED, EXTENDED RELEASE ORAL at 09:16

## 2018-04-03 RX ADMIN — METOPROLOL SUCCINATE SCH MG: 25 TABLET, EXTENDED RELEASE ORAL at 20:42

## 2018-04-03 RX ADMIN — ISOSORBIDE DINITRATE SCH MG: 5 TABLET ORAL at 06:43

## 2018-04-03 RX ADMIN — METOPROLOL SUCCINATE SCH MG: 25 TABLET, EXTENDED RELEASE ORAL at 09:15

## 2018-04-03 RX ADMIN — ISOSORBIDE DINITRATE SCH MG: 5 TABLET ORAL at 12:49

## 2018-04-03 RX ADMIN — ISOSORBIDE DINITRATE SCH MG: 5 TABLET ORAL at 17:16

## 2018-04-03 RX ADMIN — ENOXAPARIN SODIUM SCH MG: 40 INJECTION SUBCUTANEOUS at 20:42

## 2018-04-03 RX ADMIN — PANTOPRAZOLE SCH MG: 40 TABLET, DELAYED RELEASE ORAL at 09:15

## 2018-04-03 RX ADMIN — Medication SCH MG: at 09:14

## 2018-04-03 RX ADMIN — Medication SCH TAB: at 09:15

## 2018-04-03 NOTE — PROGRESS NOTE
Medicine Progress Note


Date & Time of Visit:


Apr 3, 2018 at 15:00


.


Subjective





Feels about the same.


Still having problems arising from the sitting position.


Ambulating with walker and assistance.


No fever.


No chest pain, cough, shortness of breath.


No nausea, vomiting, diarrhea.


Rash continues to improve.


.





Objective





Last 8 Hrs








  Date Time  Temp Pulse Resp B/P (MAP) Pulse Ox O2 Delivery O2 Flow Rate FiO2


 


4/3/18 17:15  61  159/87 (111)    


 


4/3/18 15:21 36.6 63 17 145/75 (98) 94 Room Air  


 


4/3/18 15:20      Room Air  








Physical Exam:





General- lying in bed, no distress


Lungs- clear to auscultation; no respiratory distress


Cardiovascular- RRR; no gallop; no JVD; trace pretibial edema


Abdomen- + bowel sounds, soft, nontender 


Extremities- no cyanosis; no calf tenderness; TEDS applied 


Neuro- alert, oriented


Skin- warm & dry; resolving rash face, trunk, extremities


.


Laboratory Results:





Last 24 Hours








Test


  4/3/18


07:02


 


White Blood Count 7.23 K/uL 


 


Red Blood Count 3.54 M/uL 


 


Hemoglobin 10.3 g/dL 


 


Hematocrit 31.4 % 


 


Mean Corpuscular Volume 88.7 fL 


 


Mean Corpuscular Hemoglobin 29.1 pg 


 


Mean Corpuscular Hemoglobin


Concent 32.8 g/dl 


 


 


RDW Standard Deviation 50.7 fL 


 


RDW Coefficient of Variation 15.6 % 


 


Platelet Count 188 K/uL 


 


Mean Platelet Volume 10.0 fL 


 


Sodium Level 139 mmol/L 


 


Potassium Level 3.8 mmol/L 


 


Chloride Level 103 mmol/L 


 


Carbon Dioxide Level 29 mmol/L 


 


Anion Gap 7.0 mmol/L 


 


Blood Urea Nitrogen 31 mg/dl 


 


Creatinine 0.94 mg/dl 


 


Est Creatinine Clear Calc


Drug Dose 46.6 ml/min 


 


 


Estimated GFR (


American) 67.3 


 


 


Estimated GFR (Non-


American 58.1 


 


 


BUN/Creatinine Ratio 33.2 


 


Random Glucose 91 mg/dl 


 


Calcium Level 8.3 mg/dl 











Assessment & Plan





GENERALIZED WEAKNESS


Probably mostly secondary to deconditioning due to recent prolonged illness and 

hospitalization.


Dehydration, steroid therapy may be contributing factors.


PT & OT evaluations requested.


Continue therapies.





DEHYDRATION


BUN and creatinine on admission 41 and 1.24, respectively.


Increase oral fluid intake.


IV fluids not ordered because of dependent edema.


BUN creatinine today 31 and 0.94, respectively.


Follow.





LEUKOCYTOSIS


White count 15,140 at time of admission.


No fever or signs/symptoms of infection.


Leukocytosis most likely secondary to steroid therapy.


White count today 7230.


Follow.





DEPENDENT EDEMA


Examination on admission revealed 1+ pretibial edema.


Patient had been on diuretic therapy with hydrochlorothiazide prior to her last 

admission which was discontinued because of worsening renal function.


Worsening edema could be secondary to cessation of diuretic therapy, sitting 

independent position for long periods of time at home, felodipine, terazosin.


Creatinine elevated, so opted for elevation and MARYBEL stockings rather than 

diuretic therapy.


Edema improved.





RASH


Extensive maculopapular rash.


Probable drug reaction; culprit uncertain, possibly due to piperacillin/

tazobactam but was receiving several other medications at the time as well.


Dermatology consulted.


Skin biopsy performed on 3/27; pathology- spongiotic dermatitis with features 

of spongiotic drug reaction.


Discharged on steroid taper and antihistamines.


Rash is significantly better.


Continue prednisone taper - 20 mg x 4 days starting 4/4, 10 mg x 4 days, then 

stop.





CORONARY ARTERY DISEASE


History of nonocclusive coronary artery disease and chronic left bundle branch 

block.


Echocardiogram on 3/17/18 demonstrated diffuse hypokinesis, LVEF of 30-35%.


Repeat echocardiogram on 3/26/18 demonstrated moderate concentric LVH, LVEF 65-

70%, mild aortic valve sclerosis without stenosis, calcification of mitral 

valve annulus with mild mitral insufficiency.


Acute coronary syndrome at that time felt to be unlikely.


Further evaluation recommended as outpatient, Lexiscan stress test versus 

diagnostic catheterization.


Statin on hold due to recent rhabdomyolysis.


Continue aspirin, metoprolol, felodipine, isosorbide dinitrate.





HYPERTENSION


Blood pressure elevated at time of admission, subsequently improved.


Blood pressure this morning 136/77.


Continue metoprolol, felodipine, nitrates.


Follow and titrate therapy.





SLEEP APNEA 


Continue CPAP.





DYSLIPIDEMIA


Atorvastatin on hold due to recent rhabdomyolysis.





HISTORY OF GOUT


Continue allopurinol.





DISPOSITION


Generalized weakness after recent prolonged illness.


Failed outpatient management.


Unable to perform ADLs independently.


Will need skilled care.


PT and OT evaluations requested.


Case Management consulted.


Internal Medicine follow-up with Dr. Cata Lozada.








Contacted by Case Management.


[facility] denied request for skilled care.


Patient does not feel comfortable / safe being discharged to home at this time, 

even with home PT and OT.


Called [facility] ~ 16:30 for peer-peer review.


# [phone]


Pt's [facility] policy # [id number]


Left message with staff for [facility] medical director to all me on my cell phone.


.


Current Inpatient Medications:





Current Inpatient Medications








 Medications


  (Trade)  Dose


 Ordered  Sig/Sirisha


 Route  Start Time


 Stop Time Status Last Admin


Dose Admin


 


 Enoxaparin Sodium


  (Lovenox Inj)  40 mg  HS


 SQ  4/1/18 21:00


 5/1/18 20:59  4/2/18 21:01


40 MG


 


 Acetaminophen


  (Tylenol Tab)  650 mg  Q4H  PRN


 PO  4/1/18 15:00


 5/1/18 14:59   


 


 


 Magnesium


 Hydroxide


  (Milk Of


 Magnesia Susp)  30 ml  Q6H  PRN


 PO  4/1/18 15:00


 5/1/18 14:59   


 


 


 Miscellaneous


  (Iv Fluids


 Completed)  1 ea  PRN  PRN


 N/A  4/1/18 15:00


 4/1/19 14:59   


 


 


 Allopurinol


  (Zyloprim Tab)  100 mg  BID


 PO  4/1/18 21:00


 5/1/18 20:59  4/3/18 09:15


100 MG


 


 Aspirin


  (Ecotrin Tab)  81 mg  DAILY


 PO  4/2/18 09:00


 5/2/18 08:59  4/3/18 09:14


81 MG


 


 Cetirizine HCl


  (zyrTEC TAB)  10 mg  DAILY  PRN


 PO  4/1/18 15:00


 5/1/18 14:59   


 


 


 Diphenhydramine


 HCl


  (Benadryl Cap)  25 mg  Q6H  PRN


 PO  4/1/18 15:00


 5/1/18 14:59   


 


 


 Felodipine


  (Plendil Tabcr)  5 mg  QAM


 PO  4/2/18 09:00


 5/2/18 08:59  4/3/18 09:16


5 MG


 


 Isosorbide


 Dinitrate


  (Isordil Tab)  10 mg  TID@0700,1200,1700


 PO  4/1/18 17:00


 5/1/18 16:59  4/3/18 17:16


10 MG


 


 Metoprolol


 Succinate


  (Toprol Xl Tab)  37.5 mg  BID


 PO  4/1/18 21:00


 5/1/18 20:59  4/3/18 09:15


37.5 MG


 


 Multivitamins


  (Multivitamin


 Tab)  1 tab  DAILY


 PO  4/2/18 09:00


 5/2/18 08:59  4/3/18 09:15


1 TAB


 


 Sodium Chloride


  (Ocean Nasal


 Spray)  2 sprays  Q4H  PRN


 NA  4/1/18 15:00


 5/1/18 14:59   


 


 


 Terazosin HCl


  (Hytrin Cap)  2 mg  QPM


 PO  4/1/18 21:00


 5/1/18 20:59  4/2/18 21:01


2 MG


 


 Pantoprazole


 Sodium


  (Protonix Tab)  40 mg  QAM


 PO  4/2/18 09:00


 5/2/18 08:59  4/3/18 09:15


40 MG


 


 Prednisone


  (PredniSONE TAB)  20 mg  DAILY


 PO  4/4/18 09:00


 4/7/18 09:01   


 


 


 Prednisone


  (PredniSONE TAB)  10 mg  DAILY


 PO  4/8/18 09:00


 4/11/18 09:01   


 


 


 Albuterol


  (Ventolin Hfa


 Inhaler)  2 puffs  QID  PRN


 INH  4/1/18 20:30


 5/1/18 16:59

## 2018-04-04 VITALS — DIASTOLIC BLOOD PRESSURE: 88 MMHG | HEART RATE: 66 BPM | SYSTOLIC BLOOD PRESSURE: 157 MMHG

## 2018-04-04 VITALS — TEMPERATURE: 98.06 F | OXYGEN SATURATION: 97 % | HEART RATE: 57 BPM

## 2018-04-04 VITALS
HEART RATE: 68 BPM | DIASTOLIC BLOOD PRESSURE: 67 MMHG | OXYGEN SATURATION: 96 % | SYSTOLIC BLOOD PRESSURE: 111 MMHG | TEMPERATURE: 98.42 F

## 2018-04-04 VITALS
OXYGEN SATURATION: 96 % | DIASTOLIC BLOOD PRESSURE: 67 MMHG | TEMPERATURE: 98.42 F | HEART RATE: 68 BPM | SYSTOLIC BLOOD PRESSURE: 111 MMHG

## 2018-04-04 RX ADMIN — METOPROLOL SUCCINATE SCH MG: 25 TABLET, EXTENDED RELEASE ORAL at 09:41

## 2018-04-04 RX ADMIN — Medication SCH TAB: at 09:41

## 2018-04-04 RX ADMIN — ISOSORBIDE DINITRATE SCH MG: 5 TABLET ORAL at 11:56

## 2018-04-04 RX ADMIN — ALLOPURINOL SCH MG: 100 TABLET ORAL at 09:41

## 2018-04-04 RX ADMIN — Medication SCH MG: at 09:40

## 2018-04-04 RX ADMIN — FELODIPINE SCH MG: 5 TABLET, FILM COATED, EXTENDED RELEASE ORAL at 09:42

## 2018-04-04 RX ADMIN — PANTOPRAZOLE SCH MG: 40 TABLET, DELAYED RELEASE ORAL at 09:41

## 2018-04-04 RX ADMIN — ISOSORBIDE DINITRATE SCH MG: 5 TABLET ORAL at 06:30

## 2018-04-04 NOTE — DISCHARGE INSTRUCTIONS
Discharge Instructions


Date of Service


Apr 4, 2018.





Admission


Reason for Admission:  Weakness





Discharge


Discharge Diagnosis / Problem:  weakness





Discharge Goals


Goal(s):  Prevent Disease Progression





Activity Recommendations


Activity Limitations:  per Instructions/Follow-up section





.





Instructions / Follow-Up


Instructions / Follow-Up


Please take all medications as instructed. 





Please ensure to follow the remaining prednisone taper schedule which includes:


20mg daily x 4 days, beginning 4/5


then, 10mg daily x 4 days, then stop.





Please have staff at  remove the sutures in your right thigh on Fri, 4/6.  To 

care for the biopsy area you may apply Vaseline petroleum jelly to the area and 

cover with a Band-Aid on a daily


basis after washing with soap and water.





Please follow all instructions per discharge summary that you just received 

last week. 





It was a pleasure taking care of you!  Call if you have any questions or 

problems.  You can reach a [facility] hospitalist on duty at [facility] 24 hours a day by calling [phone].





Take care of yourself.





Nicole Malik DO


[facility] Hospitalist





Current Hospital Diet


Patient's current hospital diet: AHA Diet (Heart Healthy)





Discharge Diet


Recommended Diet:  AHA Diet (Heart Healthy)





Procedures


Procedures Performed:  


None.





Pending Studies


Studies pending at discharge:  no





Medical Emergencies








.


Who to Call and When:





Medical Emergencies:  If at any time you feel your situation is an emergency, 

please call 911 immediately.





.





Non-Emergent Contact


Non-Emergency issues call your:  Primary Care Provider





.


.








"Provider Documentation" section prepared by Nicole Malik.








.

## 2018-04-04 NOTE — DISCHARGE SUMMARY
Discharge Summary


Date of Service


Apr 4, 2018.





Discharge Summary


Admission Date:


Apr 1, 2018 at 14:38


Discharge Date:  Apr 4, 2018


Discharge Disposition:  Skilled nursing facility


Principal Diagnosis:


Generalized weakness.





Medication Reconciliation


Changed Medications:  


Prednisone (Prednisone) 10 Mg Austyn


0 PO UD, #24 TAB (Changed from: Taper 30 mg x 4 days, 20 mg x 4 days, 10 mg x 4 

days, then stop.)


Taper starting 4/5, 20 mg x 4 days, 10 mg x 4 days, then stop.


 


Continued Medications:  


Albuterol Hfa (Ventolin Hfa) 200 Puffs/70819 Mcg Aers


2 PUFFS INH QID, #1 INHALER





Allopurinol (Zyloprim) 100 Mg Tab


100 MG PO BID





Aspirin (Aspirin 81) 81 Mg Tab


81 MG PO DAILY





Calcium Carbonate-Vitamin D (Calcium + D) 1 Tab Tab


1 TAB PO DAILY





Cetirizine (Zyrtec) 10 Mg Tab


10 MG PO DAILY PRN for Allergic Reaction, TAB





Clobetasol Propionate (Temovate) 0.05 % Oin


1 APPLN TOP 4XWK for 30 Days, #15 GM 1 Refill


APPLY SOLUTION TOPICALLY TO SCALP DAILY MONDAY THROUGH THURSDAY.


Diphenhydramine Hcl (Benadryl) 25 Mg Cap


0 PO UD PRN for itching, #60 CAP


2 pills at night as needed for severe itching/sleep


 1 pill every 6 hrs during day as needed for severe itching


Felodipine (Plendil Er) 5 Mg Tab


5 MG PO QAM





Isosorbide Dinitrate (Isosorbide Dinitrate) 5 Mg Tab


10 MG PO TID@0700,1200,1700, #90 TAB


Take 3 times a day at 7 a.m., noon, 5 p.m.


Ketoconazole (Topical) (Ketoconazole) 2 % Sha


1 APPLN TOP 2XWK for 30 Days, #120 ML 1 Refill


APPLY TO SCALP 2-3 TIMES PER WEEK


Magnesium (Magnesium) 250 Mg Tab


1 TAB PO DAILY





Meclizine Hcl (Meclizine Hcl) 25 Mg Chw


25 MG PO UD PRN for Dizziness or Vertigo





Metoprolol Succinate (Metoprolol Succinate ER) 25 Mg Tabcr


37.5 MG PO BID, #90 TAB


Take 1 + 1/2 pill (37.5 mg) twice a day.


Multivitamin (Multivitamin)  Tab


1 TAB PO DAILY





Omeprazole (Omeprazole) 20 Mg Tab


1 TAB PO QAM





Saline (Ocean Nasal Spray) 0.65 % Spr


1 DOSE INTNAS UD PRN for CONGESTION





Terazosin Hcl (Hytrin) 2 Mg Cap


2 MG PO QPM











Admission Information


HPI (per Admitting provider):





77-year-old female followed by Dr. Cata Lozada for Internal Medicine.





History of ischemic heart disease, hypertension, sleep apnea, dyslipidemia, and 

other problems as noted.





Hospitalized at [facility] 3/17/18 with facial cellulitis and 

suspected sepsis.


Prolonged hospital course complicated by rhabdomyolysis, reduced left 

ventricular ejection fraction, extensor rash felt most likely secondary to 

either vancomycin or piperacillin/tazobactam.


She received PT and OT.


By the end of her hospitalization, she was transferring with contact guard and 

ambulating more than 300 feet with a walker.


She is followed by Case Management.


Skilled care was discussed, the patient preferred discharge to home with 

outpatient PT.


Discharge to home on 3/30/18.





Patient called this morning.


Experiencing generalized weakness since returning home.


Unable to rise from chair or toilet by herself.


Her elderly  unable to provide significant assistance.





She feels well except for her generalized weakness.


No fever.


Rash is significantly better.


Continuing prednisone taper as prescribed.


She has noted dependent edema which she attributes to sitting upright in chair 

for prolonged period of time since returning home.


No chest pain, cough, shortness of breath, nausea, vomiting, diarrhea, urinary 

symptoms.


.


Physical Exam (per Admitting):





CONSTITUTIONAL   


vital signs as noted above


well-developed, well-nourished, no acute distress





EYES      


conjunctivae clear; lids normal


pupils equal and reactive to light





EARS, NOSE, MOUTH AND THROAT


external inspection of ears and nose unremarkable


hearing grossly intact to spoken voice


oropharynx clear; no ulcerations of tongue or oral mucosa





NECK         


no masses; trachea midline


thyroid normal





RESPIRATORY      


normal respiratory effort; no respiratory distress


clear to percussion


clear to auscultation





CARDIOVASCULAR   


regular rate and rhythm


S4 gallop


no murmur


carotid arteries 2/2


pedal pulses intact and symmetric


capillary refill toes < 2 seconds


1+ pretibial edema





GASTROINTESTINAL   


normal bowel sounds, soft, nontender; no palpable masses


no hepatomegaly; no splenomegaly





LYMPHATIC      


no cervical adenopathy





MUSCULOSKELETAL   


no cyanosis; no digital clubbing


no calf tenderness


motor strength extremities grossly intact





SKIN         


resolving erythematous rash involving face, trunk, extremities


warm and dry





NEUROLOGIC      


PERRL, EOMI, no facial palsy, no dysarthria, tongue midline


patellar DTR's 2/2   





PSYCHIATRIC      


oriented to person, place, time


mood and affect appropriate


.





Hospital Course





GENERALIZED WEAKNESS


Probably mostly secondary to deconditioning due to recent prolonged illness and 

hospitalization.


Dehydration, steroid therapy may be contributing factors.


PT & OT evaluations requested.


Continue therapies.





DEHYDRATION


BUN and creatinine on admission 41 and 1.24, respectively.


Increase oral fluid intake.


IV fluids not ordered because of dependent edema.


BUN creatinine today 31 and 0.94, respectively.


Follow.





LEUKOCYTOSIS


White count 15,140 at time of admission.


No fever or signs/symptoms of infection.


Leukocytosis most likely secondary to steroid therapy.


White count today 7230.


Follow.





DEPENDENT EDEMA


Examination on admission revealed 1+ pretibial edema.


Patient had been on diuretic therapy with hydrochlorothiazide prior to her last 

admission which was discontinued because of worsening renal function.


Worsening edema could be secondary to cessation of diuretic therapy, sitting 

independent position for long periods of time at home, felodipine, terazosin.


Creatinine elevated, so opted for elevation and MARYBEL stockings rather than 

diuretic therapy.


Edema improved.





RASH


Extensive maculopapular rash.


Probable drug reaction; culprit uncertain, possibly due to piperacillin/

tazobactam but was receiving several other medications at the time as well.


Dermatology consulted.


Skin biopsy performed on 3/27; pathology- spongiotic dermatitis with features 

of spongiotic drug reaction.


Discharged on steroid taper and antihistamines.


Rash is significantly better.


Continue prednisone taper - 20 mg x 4 days starting 4/4, 10 mg x 4 days, then 

stop.





CORONARY ARTERY DISEASE


History of nonocclusive coronary artery disease and chronic left bundle branch 

block.


Echocardiogram on 3/17/18 demonstrated diffuse hypokinesis, LVEF of 30-35%.


Repeat echocardiogram on 3/26/18 demonstrated moderate concentric LVH, LVEF 65-

70%, mild aortic valve sclerosis without stenosis, calcification of mitral 

valve annulus with mild mitral insufficiency.


Acute coronary syndrome at that time felt to be unlikely.


Further evaluation recommended as outpatient, Lexiscan stress test versus 

diagnostic catheterization.


Statin on hold due to recent rhabdomyolysis.


Continue aspirin, metoprolol, felodipine, isosorbide dinitrate.





HYPERTENSION


Blood pressure elevated at time of admission, subsequently improved.


Blood pressure this morning 136/77.


Continue metoprolol, felodipine, nitrates.


Follow and titrate therapy.





SLEEP APNEA 


Continue CPAP.





DYSLIPIDEMIA


Atorvastatin on hold due to recent rhabdomyolysis.





HISTORY OF GOUT


Continue allopurinol.





DISPOSITION


Generalized weakness after recent prolonged illness.


Failed outpatient management.


Unable to perform ADLs independently.


Will need skilled care.


PT and OT evaluations requested.


Case Management consulted.


Internal Medicine follow-up with Dr. Cata Lozada.








Contacted by Case Management.


[facility] denied request for skilled care.


Patient does not feel comfortable / safe being discharged to home at this time, 

even with home PT and OT.


Called [facility] ~ 16:30 for peer-peer review.


# [phone]


Pt's [facility] policy # [id number]


Left message with staff for [facility] medical director to all me on my cell phone.


.


Total time spent on discharge = 60 minutes


This includes examination of the patient, discharge planning, medication 

reconciliation, and communication with other providers.





Discharge Instructions


 [place] [address]


 


 


 Discharge Medical


 


Patient Name: Laura Arnett Unit Number: [id number]


YOB: 1940 Patient Status: Admitted Inpatient (obs)


Attending Doctor: Nicole Malik DO Account Number: [id number]


   


 DI: Medical v5 


Discharge Instructions


Date of Service


Apr 4, 2018.





Admission


Reason for Admission:  Weakness





Discharge


Discharge Diagnosis / Problem:  weakness





Discharge Goals


Goal(s):  Prevent Disease Progression





Activity Recommendations


Activity Limitations:  per Instructions/Follow-up section





.





Instructions / Follow-Up


Instructions / Follow-Up


Please take all medications as instructed. 





Please ensure to follow the remaining prednisone taper schedule which includes:


20mg daily x 4 days, beginning 4/5


then, 10mg daily x 4 days, then stop.





Please have staff at  remove the sutures in your right thigh on Fri, 4/6.  To 

care for the biopsy area you may apply Vaseline petroleum jelly to the area and 

cover with a Band-Aid on a daily


basis after washing with soap and water.





Please follow all instructions per discharge summary that you just received 

last week. 





It was a pleasure taking care of you!  Call if you have any questions or 

problems.  You can reach a [facility] hospitalist on duty at [facility] 24 hours a day by calling [phone].





Take care of yourself.





Nicole Malik DO


[facility] Hospitalist





Current Hospital Diet


Patient's current hospital diet: AHA Diet (Heart Healthy)





Discharge Diet


Recommended Diet:  AHA Diet (Heart Healthy)





Procedures


Procedures Performed:  


None.





Pending Studies


Studies pending at discharge:  no





Medical Emergencies








.


Who to Call and When:





Medical Emergencies:  If at any time you feel your situation is an emergency, 

please call 911 immediately.





.





Non-Emergent Contact


Non-Emergency issues call your:  Primary Care Provider





.


.








"Provider Documentation" section prepared by Nicole Malik.








.





Additional Copies To


Cata Lozada M.D.

## 2018-08-07 ENCOUNTER — HOSPITAL ENCOUNTER (OUTPATIENT)
Dept: HOSPITAL 45 - C.CATH | Age: 78
Discharge: HOME | End: 2018-08-07
Attending: PHYSICIAN ASSISTANT
Payer: COMMERCIAL

## 2018-08-07 VITALS
SYSTOLIC BLOOD PRESSURE: 147 MMHG | TEMPERATURE: 98.24 F | OXYGEN SATURATION: 94 % | DIASTOLIC BLOOD PRESSURE: 74 MMHG | HEART RATE: 87 BPM

## 2018-08-07 VITALS
BODY MASS INDEX: 30.8 KG/M2 | HEIGHT: 60.98 IN | WEIGHT: 163.14 LBS | WEIGHT: 163.14 LBS | BODY MASS INDEX: 30.8 KG/M2 | HEIGHT: 60.98 IN

## 2018-08-07 VITALS — HEART RATE: 68 BPM | OXYGEN SATURATION: 97 % | SYSTOLIC BLOOD PRESSURE: 161 MMHG | DIASTOLIC BLOOD PRESSURE: 70 MMHG

## 2018-08-07 DIAGNOSIS — Z82.49: ICD-10-CM

## 2018-08-07 DIAGNOSIS — Z88.1: ICD-10-CM

## 2018-08-07 DIAGNOSIS — Z82.3: ICD-10-CM

## 2018-08-07 DIAGNOSIS — I50.30: ICD-10-CM

## 2018-08-07 DIAGNOSIS — Z88.2: ICD-10-CM

## 2018-08-07 DIAGNOSIS — Z79.899: ICD-10-CM

## 2018-08-07 DIAGNOSIS — M19.90: ICD-10-CM

## 2018-08-07 DIAGNOSIS — R94.39: Primary | ICD-10-CM

## 2018-08-07 DIAGNOSIS — E78.5: ICD-10-CM

## 2018-08-07 DIAGNOSIS — Z79.82: ICD-10-CM

## 2018-08-07 DIAGNOSIS — R06.09: ICD-10-CM

## 2018-08-07 DIAGNOSIS — G47.33: ICD-10-CM

## 2018-08-07 DIAGNOSIS — I25.10: ICD-10-CM

## 2018-08-07 DIAGNOSIS — I11.9: ICD-10-CM

## 2018-08-07 DIAGNOSIS — Z91.040: ICD-10-CM

## 2018-08-07 DIAGNOSIS — Z87.891: ICD-10-CM

## 2018-08-07 DIAGNOSIS — E66.3: ICD-10-CM

## 2018-08-07 DIAGNOSIS — I44.7: ICD-10-CM

## 2018-08-07 NOTE — [facility] POST OPERATIVE BRIEF NOTE
Preliminary Procedure Note


Procedure Date


Aug 7, 2018.





Pre-Procedure Diagnosis


Positive Stress Test, Cardiomyopathy





AUC Score


7





Post-Procedure Diagnosis


Mild CAD





Procedure(s) Performed


Coronary Angiography, Left Heart Cath, LV Angiography





Cardiologist


Dr. Luis Spencer





Assistant(s)


Martin Contino





Estimated Blood Loss


Less than 20 cc





Medication(s)


Fentanyl (12.5 mcg IV 2), Nicardipine (250 mcg intra-arterial after arterial 

radial sheath insertion and prior to sheath removal), Nitroglycerin (200 mcg 

intra-arterial radial sheath site prior to sheath removal), Versed (1 mg IV), 

Lidocaine 1% (Local infiltration access sites)





Preliminary Findings


Right dominant coronary anatomy


Large-caliber coronary vessels with mild diffuse luminal irregularities, 30% 

mid left anterior descending and mid circumflex narrowings


Horizontally oriented left ventricle with visible hypertrophy and low normal LV 

systolic function EF 50%, no mitral insufficiency


Mild to moderate systolic and end diastolic hypertension





Recommendations


Medical therapy and/or Counseling





Specimens


None





Fluids (cc crystalloids)


50 cc





Anesthesia


Start time 0814, End time 0855





Procedural Complication(s)


None





Disposition


Cath Lab Holding/Recovery

## 2018-08-07 NOTE — CARDIAC CATH REPORT
REFERRING PHYSICIANS:  Dr. Bal Chong.  Mark Lombardo, PA-C.

 

INDICATIONS:  Abnormal stress testing, history of cardiomyopathy.

 

BRIEF HISTORY:  Patient is a 78-year-old female with past history of

hypertensive disease and possible Takotsubo cardiomyopathy in March 2018. 

Followup evaluation for persistent chest discomfort, weakness included stress

nuclear imaging with study demonstrating stress induced EKG abnormalities and

possible stress induced ischemic changes in the apex and inferior wall.  She

is referred now for further evaluation.  No signs or symptoms of heart

failure.  Anginal pattern was class 2-3.  She has had no history of

cardiogenic shock.  Imaging demonstrated diminished LV systolic function, EF

45%.

 

ACCESS:  Note attempts were made for right radial access.  A radial access

was obtained and 6-Canadian sheath placed; however, subclavian vessels were

more markedly tortuous, and on attempts to advance catheter until the aortic

root, patient experienced spasm in the vessels which had limited catheter

movement.  Procedure was then switched to right groin access, which was

performed uneventfully.

 

CATHETERS:  Right radial catheter is 6-Canadian long glide sheath.  JL3.5,

right femoral artery approach.  5-Canadian arterial sheath, 5-Canadian JL4,

5-Canadian 3DRC, 5-Canadian straight pigtail.

 

CONTRAST:  Nonionic x90 mL Optiray.

 

IV FLUIDS:  50 mL normal saline.

 

SEDATION:  Start time 0814, end time 0855.

 

SEDATION GIVEN:  Versed 1 mg IV, fentanyl 12.5 mcg IV x2.

 

ADDITIONAL MEDICATIONS:  After right radial sheath inserted, patient received

250 mcg intraarterial nicardipine.  Prior to sheath removal, patient received

an additional 250 mcg nicardipine and 200 mcg intraarterial nitroglycerin. 

Patient also received 0.4 mg sublingual nitroglycerin prior to femoral sheath

removal for blood pressure control.

 

RADIATION EXPOSURE:  8.3 minutes fluoroscopy, 901 mGy, DAP score 6553.

 

COMPLICATIONS:  None.

 

Procedural issues as noted above with tortuosity in the right subclavian

limiting access via right radial approach.

 

RESULTS:

Coronary Angiography:  Right dominant coronary anatomy is present.

 

Left Main:  The left main is long and trifurcates to give rise to left

anterior descending, large ramus intermedius, and the left circumflex.  The

vessel was free of disease and calcification.

 

Left Anterior Descending:  The left anterior descending is a large caliber

vessel with type 3 distribution.  It gives rise to a large septal branch in

its proximal third and a very large diagonal branch at the end of its

proximal third and then courses to terminate well beyond the apex.  Within

the left anterior descending, there are mild luminal irregularities

throughout its vessel with a discrete 30% narrowing after the first septal

branch and an additional 30% narrowing after the diagonal branch.  There is

no high grade obstruction.

 

Ramus Intermedius:  This is a large caliber vessel, which reaches to the apex

and is free of disease other than minimal luminal irregularities.

 

Left Circumflex:  The left circumflex is large but nondominant, gives rise to

a tiny first marginal, moderate second marginal, and then 2 very large

posterolateral branches.  Within the left circumflex, there are moderate

luminal irregularities throughout its vasculature without obstruction.

 

Right Coronary Artery:  The right coronary artery is very large in caliber,

with mild ectasia, dominant in distribution.  It gives rise to a sinoatrial

branch shortly after its origin, a right ventricular branch in its mid

portion, small acute marginal branch at the AV groove, a moderate caliber PDA

and an accessory PDA along the AV groove with single posterior ventricular

branch.  Within the right coronary artery, there is mild ectasia and moderate

luminal irregularities of 20% in the serial segments but no obstruction.

 

Left Ventricular Angiography:  The left ventricle was horizontally oriented

with mid ventricular hypertrophy with "slipper like" appearance though

without obstruction.  Ejection fraction is 50%.  There is trace mitral

insufficiency.

 

Hemodynamics:  Initial aortic root pressure was 168/72 with a mean of 109. 

LV pressure following coronary angiography is 185/7, with an EDP of 24.  On

pullback to the aortic root, there was no transaortic valve gradient.  Aortic

root pressure was close, was 176/74 with a mean of 117.

 

FINAL IMPRESSION:

1.  Large caliber coronaries, right dominant.

2.  Mild to moderate diffuse luminal irregularities, all vessels without high

grade obstruction.

3.  30% proximal and mid left anterior descending stenoses as the most

significant narrowing, 20% mid right coronary artery with moderate ectasia. 

Findings are consistent with prior remote study of 2004.

4.  Horizontally oriented left ventricle with hypertrophy and low normal left

ventricular function, ejection fraction 50%.

5.  Hypertension and elevated left end diastolic pressure.

 

RECOMMENDATIONS:  Continue medical management.

## 2018-08-07 NOTE — PRE SEDATION ASSESSMENT
Pre Sedation Assessment


General


Date of Sedation:


Aug 7, 2018.





Vital Signs Past 12 Hours








  Date Time  Temp Pulse Resp B/P (MAP) Pulse Ox O2 Delivery O2 Flow Rate FiO2


 


8/7/18 07:10 36.8 87 16 147/74 (98) 94 Mask  











Review


Cardiovascular:  regular rate, rhythm, no edema, no gallop


Lungs:  lungs clear





Pre-Sedation Airway Assessment


Smoking Status:  Former Smoker


Hx of Sleep Apnea:  Yes


Short Thick Neck:  No


Thyro-mental Distance:  > 3 Finger Breadths


Oral Cavity:  WNL


Mallampati Classification:  Class II


ASA Classification:  Class III





NPO Status


Date of Last Intake of Fluids:  Aug 6, 2018


Time of Last Intake of Fluids:  2100


Date of Last Intake of Solids:  Aug 6, 2018


Time of Last Intake of Solids:  2100





Procedure Planning


Contraindications for Sedation:  None


Current Medications Reviewed:  Yes





Notes








The planned sedation has been discussed with the patient. Informed Consent was 

obtained.  I have identified the patient, determined the appropriateness of 

sedation and have assessed the patient immediately prior to the procedure.  





All medicine(s) and interventions are by my order.

## 2018-08-07 NOTE — DISCHARGE INSTRUCTIONS
Discharge Instructions


Procedure


Procedure Date:


Aug 7, 2018.


Reason for Visit:


Abnormal Nuclear Stress Test *Dr Spencer Doing*.





Discharge


Discharge Date:


Aug 7, 2018.


Discharge Diagnosis:


Mild coronary atherosclerosis without obstruction


Last Recorded Wt (Kilograms):  74





Anesthesia


Post Anesthesia Instructions:


If you have had General Anesthesia or IV Sedation:





*  Do not drive today.





*  Resume driving when surgeon permits.





*  Do not make important decisions or sign legal documents today.





*  Call surgeon for:


   1.  Temperature elevations greater than 101 degrees F.


   2.  Uncontrollable pain.


   3.  Excessive bleeding.


   4.  Persistent nausea and vomiting.


   5.  Medication intolerance (nausea, vomiting or rash).





*  For nausea and vomiting use only clear liquids such as: tea, soda, bouillon 

until


   nausea subsides, then gradually increase diet as tolerated.





*  If you have any concerns or questions, call your surgeon's office.  If 

physician is 


   unavailable and it is an emergency, call 911 or go to the nearest emergency 

room.





Instructions


Activity Recommendations:  limitations as noted below


Recommended Home Diet:  resume previous diet


Allergies:  


Coded Allergies:  


     Piperacillin (Verified  Allergy, Intermediate, RASH, 4/1/18)


 Developed extensive drug eruption 3/24/18 while receiving


 vancomycin, piperacillin / tazobactam, and other meds.


 Skin Bx 3/27/18 - spongiotic dermatitis with features of


 spongiotic drug reaction.


     Tazobactam (Verified  Allergy, Intermediate, RASH, 4/1/18)


 Developed extensive drug eruption 3/24/18 while receiving


 vancomycin, piperacillin / tazobactam, and other meds.


 Skin Bx 3/27/18 - spongiotic dermatitis with features of


 spongiotic drug reaction.


     Vancomycin (Verified  Allergy, Intermediate, RASH, 4/1/18)


 Developed extensive drug eruption 3/24/18 while receiving


 vancomycin, piperacillin / tazobactam, and other meds.


 Skin Bx 3/27/18 - spongiotic dermatitis with features of


 spongiotic drug reaction.


     Latex1 -Allergic Contact Dermititis (Verified  Allergy, Unknown, SLIGHT 

RASH, 3/17/18)


     Doxycycline (Unverified  Adverse Reaction, Unknown, GI UPSET, 3/17/18)


     Meperidine (Unverified  Adverse Reaction, Unknown, GI UPSET, 3/17/18)


     Propoxyphene (Unverified  Adverse Reaction, Unknown, GI UPSET, 3/17/18)


     Sulfa Antibiotics (Unverified  Adverse Reaction, Unknown, GI UPSET, 3/17/18

)


Provider Instructions


ACTIVITY RECOMMENDATIONS:





It is common to feel weak and fatigue for a few days.





*  Do not drive or operate any motorized equipment for the next


    three days.





*  Limit stair usage (2 or 3 trips a day only) for the next three days.





*  Do not lift anything heavier than 10 pounds for the next three


    days.





*  Do not engage in vigorous exercise or any sports for the next


    five days.





*  You may shower the day after your procedure, but do not 


    immerse the area for three days.  Cleanse the site gently with


    soap and water.








SPECIAL CARE INSTRUCTIONS:





* You may replace the pressure dressing or band-aid the morning 


after the procedure.





* After your procedure, it is normal to have a small bruise or small lump


at the site.  Examine your site daily for any change in the bruise or lump,


redness, swelling, drainage or numbness.  


Notify your doctor if any change.





BLEEDING:





* If there is a small amount of bleeding at the site, lie down and apply


firm pressure with a clean cloth for ten minutes.  When the bleeding stops,


lie quietly keeping the procedure limb straight for six hours.  


Notify your doctor as soon as possible.





* If the bleeding does not stop after ten minutes or if there is a large


amount of bleeding or spurting, call 911 immediately.  Continue to lie 


down and hold firm pressure until help arrives.





SKIN IRRITATION:





*  You may experience some redness and/or swelling in the area where radiation 

was


administered.  If any skin irritation occurs, please contact your family 

physician.








FOLLOW UP VISIT:





Keep any scheduled doctor appointments.





Follow Up


Follow-up with:


Mark Lombardo as scheduled





Emily Hernandez Recommendations:


 


Call your doctor if:





*  Temperature above 101 degrees


*  Pain not relieved by pain medicine ordered


*  There is increased drainage or redness from any incision


*  You have any unanswered questions or concerns.





Your Doctors Instructions noted above were prepared by provider Luis Spencer.


Patient Signature Section:





 Patient Instructions Signature Page














Laura Arnett 











Patient (or Guardian) Signature/Date:____________________________________ I 

have read and understand the instructions given to me by my caregivers.








Caregiver/RN/Doctor Signature/Date:____________________________________











The above-named patient and/or guardian has received patient instructions on 

this date.





























+  Original Patient Signature Page (only) stays with chart.  Please make copy 

for patient.